# Patient Record
Sex: FEMALE | Race: WHITE | NOT HISPANIC OR LATINO | Employment: OTHER | URBAN - METROPOLITAN AREA
[De-identification: names, ages, dates, MRNs, and addresses within clinical notes are randomized per-mention and may not be internally consistent; named-entity substitution may affect disease eponyms.]

---

## 2017-01-26 ENCOUNTER — OFFICE VISIT (OUTPATIENT)
Dept: MEDICAL GROUP | Facility: MEDICAL CENTER | Age: 72
End: 2017-01-26
Payer: MEDICARE

## 2017-01-26 VITALS
TEMPERATURE: 98.3 F | OXYGEN SATURATION: 96 % | HEART RATE: 91 BPM | DIASTOLIC BLOOD PRESSURE: 74 MMHG | WEIGHT: 173 LBS | SYSTOLIC BLOOD PRESSURE: 124 MMHG | HEIGHT: 64 IN | BODY MASS INDEX: 29.53 KG/M2

## 2017-01-26 DIAGNOSIS — J01.10 ACUTE NON-RECURRENT FRONTAL SINUSITIS: ICD-10-CM

## 2017-01-26 PROCEDURE — 4040F PNEUMOC VAC/ADMIN/RCVD: CPT | Mod: 8P | Performed by: FAMILY MEDICINE

## 2017-01-26 PROCEDURE — 3017F COLORECTAL CA SCREEN DOC REV: CPT | Mod: 8P | Performed by: FAMILY MEDICINE

## 2017-01-26 PROCEDURE — G8432 DEP SCR NOT DOC, RNG: HCPCS | Performed by: FAMILY MEDICINE

## 2017-01-26 PROCEDURE — G8484 FLU IMMUNIZE NO ADMIN: HCPCS | Performed by: FAMILY MEDICINE

## 2017-01-26 PROCEDURE — 1036F TOBACCO NON-USER: CPT | Performed by: FAMILY MEDICINE

## 2017-01-26 PROCEDURE — G8420 CALC BMI NORM PARAMETERS: HCPCS | Performed by: FAMILY MEDICINE

## 2017-01-26 PROCEDURE — 1101F PT FALLS ASSESS-DOCD LE1/YR: CPT | Performed by: FAMILY MEDICINE

## 2017-01-26 PROCEDURE — 99213 OFFICE O/P EST LOW 20 MIN: CPT | Performed by: FAMILY MEDICINE

## 2017-01-26 PROCEDURE — 3014F SCREEN MAMMO DOC REV: CPT | Performed by: FAMILY MEDICINE

## 2017-01-26 RX ORDER — AMOXICILLIN AND CLAVULANATE POTASSIUM 875; 125 MG/1; MG/1
1 TABLET, FILM COATED ORAL 2 TIMES DAILY
Qty: 20 TAB | Refills: 0 | Status: SHIPPED | OUTPATIENT
Start: 2017-01-26 | End: 2017-10-20

## 2017-01-26 RX ORDER — AZITHROMYCIN 250 MG/1
TABLET, FILM COATED ORAL
Qty: 6 TAB | Refills: 0 | Status: SHIPPED | OUTPATIENT
Start: 2017-01-26 | End: 2017-01-31

## 2017-01-26 NOTE — MR AVS SNAPSHOT
"Gali Broderick   2017 10:20 AM   Office Visit   MRN: [id number]    Department:  [facility]   Dept Phone:  [phone]    Description:  Female : 1945   Provider:  Zara Gonzalez M.D.           Reason for Visit     Sinusitis for 2 weeks      Allergies as of 2017     Allergen Noted Reactions    Iodine 2016   Swelling      You were diagnosed with     Acute non-recurrent frontal sinusitis   [[id number]]         Vital Signs     Blood Pressure Pulse Temperature Height Weight Body Mass Index    124/74 mmHg 91 36.8 °C (98.3 °F) 1.631 m (5' 4.21\") 78.472 kg (173 lb) 29.50 kg/m2    Oxygen Saturation Breastfeeding? Smoking Status             96% No Never Smoker          Basic Information     Date Of Birth Sex Race Ethnicity Preferred Language    1945 Female White Non- English      Problem List              ICD-10-CM Priority Class Noted - Resolved    Right knee pain M25.561   2016 - Present    Other seasonal allergic rhinitis J30.2   2016 - Present    Actinic keratosis L57.0   2016 - Present    Dysfunction of eustachian tube H69.80   2016 - Present    Gastroesophageal reflux disease K21.9   2016 - Present    Hyperlipidemia E78.5   2016 - Present    Proteinuria R80.9   2016 - Present    Abnormal renal function N28.9   2016 - Present    Nasal bleeding R04.0   2016 - Present    Vitamin D deficiency E55.9   2016 - Present    Acute non-recurrent frontal sinusitis J01.10   2017 - Present      Health Maintenance        Date Due Completion Dates    IMM DTaP/Tdap/Td Vaccine (1 - Tdap) 1962 ---    PAP SMEAR 1964 ---    COLONOSCOPY 1993 ---    IMM ZOSTER VACCINE 2003 ---    IMM PNEUMOCOCCAL 65+ (ADULT) LOW/MEDIUM RISK SERIES (1 of 2 - PCV13) 2008 ---    BONE DENSITY 2010    IMM INFLUENZA (1) 2016 ---    MAMMOGRAM 8/3/2017 8/3/2016, 2016, 2016, 2005            Current Immunizations     No " immunizations on file.      Below and/or attached are the medications your provider expects you to take. Review all of your home medications and newly ordered medications with your provider and/or pharmacist. Follow medication instructions as directed by your provider and/or pharmacist. Please keep your medication list with you and share with your provider. Update the information when medications are discontinued, doses are changed, or new medications (including over-the-counter products) are added; and carry medication information at all times in the event of emergency situations     Allergies:  IODINE - Swelling               Medications  Valid as of: January 26, 2017 - 10:21 AM    Generic Name Brand Name Tablet Size Instructions for use    Amoxicillin-Pot Clavulanate (Tab) AUGMENTIN 875-125 MG Take 1 Tab by mouth 2 times a day.        Azithromycin (Tab) ZITHROMAX 250 MG 2 tabs by mouth day 1, 1 tab by mouth days 2-5        Fluticasone Propionate (Suspension) FLONASE 50 MCG/ACT Spray 2 Sprays in nose every day.        Mupirocin (Ointment) BACTROBAN 2 % Apply to affected area TID prn        .                 Medicines prescribed today were sent to:     [facility] [address] [place] [address], [place] [address] [facility] [address]    [address] [place] [address]    Phone: [phone] Fax: [phone]    Open 24 Hours?: No      Medication refill instructions:       If your prescription bottle indicates you have medication refills left, it is not necessary to call your provider’s office. Please contact your pharmacy and they will refill your medication.    If your prescription bottle indicates you do not have any refills left, you may request refills at any time through one of the following ways: The online TeamSnap system (except Urgent Care), by calling your provider’s office, or by asking your pharmacy to contact your provider’s office with a refill request. Medication refills are processed only during regular business hours  and may not be available until the next business day. Your provider may request additional information or to have a follow-up visit with you prior to refilling your medication.   *Please Note: Medication refills are assigned a new Rx number when refilled electronically. Your pharmacy may indicate that no refills were authorized even though a new prescription for the same medication is available at the pharmacy. Please request the medicine by name with the pharmacy before contacting your provider for a refill.           [email or web address] Access Code: Activation code not generated  Current [email or web address] Status: Active

## 2017-01-29 NOTE — ASSESSMENT & PLAN NOTE
Patient complains of sinus pressure on the right frontal and behind the right eye for 2 weeks.  Yellow rhinorrhea and PND.  Slight cough at night.  No fever or chills.  Symptoms don't seem to improving.

## 2017-01-29 NOTE — PROGRESS NOTES
"Subjective:     Chief Complaint   Patient presents with   • Sinusitis     for 2 weeks       Gali Broderick is a 71 y.o. female here today for evaluation and management of:    Acute non-recurrent frontal sinusitis  Patient complains of sinus pressure on the right frontal and behind the right eye for 2 weeks.  Yellow rhinorrhea and PND.  Slight cough at night.  No fever or chills.  Symptoms don't seem to improving.       Allergies   Allergen Reactions   • Iodine Swelling       Current medicines (including changes today)  Current Outpatient Prescriptions   Medication Sig Dispense Refill   • amoxicillin-clavulanate (AUGMENTIN) 875-125 MG Tab Take 1 Tab by mouth 2 times a day. 20 Tab 0   • azithromycin (ZITHROMAX) 250 MG Tab 2 tabs by mouth day 1, 1 tab by mouth days 2-5 6 Tab 0   • mupirocin (BACTROBAN) 2 % Ointment Apply to affected area TID prn 15 g 1   • fluticasone (FLONASE) 50 MCG/ACT nasal spray Spray 2 Sprays in nose every day. 16 g 11     No current facility-administered medications for this visit.       She  has a past medical history of Allergy; Measles; Mumps; and Chickenpox.    Patient Active Problem List    Diagnosis Date Noted   • Acute non-recurrent frontal sinusitis 01/26/2017   • Nasal bleeding 09/09/2016   • Vitamin D deficiency 09/09/2016   • Actinic keratosis 08/29/2016   • Dysfunction of eustachian tube 08/29/2016   • Gastroesophageal reflux disease 08/29/2016   • Hyperlipidemia 08/29/2016   • Proteinuria 08/29/2016   • Abnormal renal function 08/29/2016   • Right knee pain 05/09/2016   • Other seasonal allergic rhinitis 05/09/2016       ROS   No fever or chills.  No nausea or vomiting.  No chest pain or palpitations.  No cough or SOB.  No pain with urination or hematuria.  No black or bloody stools.       Objective:     Blood pressure 124/74, pulse 91, temperature 36.8 °C (98.3 °F), height 1.631 m (5' 4.21\"), weight 78.472 kg (173 lb), SpO2 96 %, not currently breastfeeding. Body mass index is 29.5 " kg/(m^2).   Physical Exam:  Well developed, well nourished.  Alert, oriented in no acute distress.  Eye contact is good, speech goal directed, affect calm  Eyes: conjunctiva non-injected, sclera non-icteric.  Ears: Pinna normal. TM pearly gray.   Nose: Nares are patent.  Erythematous swollen mucosa with yellow discharge  Mouth: Oral mucous membranes pink and moist with no lesions. Yellow PND  Neck Supple.  No adenopathy or masses in the neck or supraclavicular regions. No thyromegaly  Lungs: clear to auscultation bilaterally with good excursion. No wheezes or rhonchi  CV: regular rate and rhythm. No murmur  Sinuses- tender right frontal sinus to percussion          Assessment and Plan:   The following treatment plan was discussed    1. Acute non-recurrent frontal sinusitis  Increase fluids and rest.  Start Augmentin  - amoxicillin-clavulanate (AUGMENTIN) 875-125 MG Tab; Take 1 Tab by mouth 2 times a day.  Dispense: 20 Tab; Refill: 0    To take with her on her next travel abroad  - azithromycin (ZITHROMAX) 250 MG Tab; 2 tabs by mouth day 1, 1 tab by mouth days 2-5  Dispense: 6 Tab; Refill: 0    Any change or worsening of signs or symptoms, patient encouraged to follow-up or report to the emergency room for further evaluation. Patient understands and agrees.    Followup: Return if symptoms worsen or fail to improve.

## 2017-06-05 ENCOUNTER — PATIENT OUTREACH (OUTPATIENT)
Dept: HEALTH INFORMATION MANAGEMENT | Facility: OTHER | Age: 72
End: 2017-06-05

## 2017-06-05 NOTE — PROGRESS NOTES
6/5/17  -   WebIZ Checked & Epic Updated: yes  HealthConnect Verified: MCR  Verify PCP: yes    Communication Preference Obtained: yes     Review Care Team: yes    Annual Wellness Visit Scheduling  1. Scheduling Status:Not Scheduled. Patient states they are not interested     Care Gap Scheduling (Attempt to Schedule EACH Overdue Care Gap!)     Health Maintenance Due   Topic Date Due   • Annual Wellness Visit  Declined all care gaps   • IMM DTaP/Tdap/Td Vaccine (1 - Tdap)    • PAP SMEAR     • COLONOSCOPY     • IMM ZOSTER VACCINE     • IMM PNEUMOCOCCAL 65+ (ADULT) LOW/MEDIUM RISK SERIES (1 of 2 - PCV13)    • BONE DENSITY           MyChart Activation: Active

## 2017-10-13 ENCOUNTER — TELEPHONE (OUTPATIENT)
Dept: MEDICAL GROUP | Facility: MEDICAL CENTER | Age: 72
End: 2017-10-13

## 2017-10-13 NOTE — TELEPHONE ENCOUNTER
ANNUAL WELLNESS VISIT PRE-VISIT PLANNING     1.  Reviewed note from last office visit with PCP: YES    2.  If any orders were placed at last visit, do we have Results/Consult Notes?        •  Labs - Labs were not ordered at last office visit.   Note: If patient appointment is for lab review and patient did not complete labs,                check with provider if OK to reschedule patient until labs completed.       •  Imaging - Imaging was not ordered at last office visit.       •  Referrals - No referrals were ordered at last office visit.    3.  Immunizations were updated in Epic using WebIZ?: No WebIZ record       •  WebIZ Recommendations: NO RECORD        •  Is patient due for Tdap? YES. Patient was notified of copay/out of pocket cost.       •  Is patient due for Shingles? YES. Patient was notified of copay/out of pocket cost.     4.  Patient is due for the following Health Maintenance Topics:   Health Maintenance Due   Topic Date Due   • MAMMOGRAM  08/03/2017   • IMM INFLUENZA (1) 09/01/2017        - Patient is up-to-date on all Health Maintenance topics. No records have been requested at this time.    5.  Reviewed/Updated the following with patient:       •   Preferred Pharmacy? YES       •   Preferred Lab? YES       •   Preferred Communication? YES       •   Allergies? YES       •   Medications? YES. Was Abstract Encounter opened and chart updated? YES       •   Social History? NO       •   Family History (document living status of immediate family members and if + hx of cancer, diabetes, hypertension, hyperlipidemia, heart attack, stroke) YES. Was Abstract Encounter opened and chart updated? YES    6.  Care Team Updated:       •   DME Company (gait device, O2, CPAP, etc.): NO       •   Other Specialists (eye doctor, derm, GYN, cardiology, endo, etc): NO    7.  Patient has the following Care Path diagnoses on Problem List:  NONE    8.  Specialty Comments was updated with diagnosis information provided by SCP:  YES    9.  Patient was advised: “This is a free wellness visit. The provider will screen for medical conditions to help you stay healthy. If you have other concerns to address you may be asked to discuss these at a separate visit or there may be an additional fee.”     6.  Patient was informed to arrive 15 min prior to their scheduled appointment and bring in their medication bottles.

## 2017-10-20 ENCOUNTER — OFFICE VISIT (OUTPATIENT)
Dept: MEDICAL GROUP | Facility: MEDICAL CENTER | Age: 72
End: 2017-10-20
Payer: MEDICARE

## 2017-10-20 VITALS
TEMPERATURE: 97.6 F | HEIGHT: 64 IN | DIASTOLIC BLOOD PRESSURE: 86 MMHG | OXYGEN SATURATION: 96 % | WEIGHT: 166.4 LBS | BODY MASS INDEX: 28.41 KG/M2 | SYSTOLIC BLOOD PRESSURE: 140 MMHG | HEART RATE: 95 BPM

## 2017-10-20 DIAGNOSIS — Z00.00 MEDICARE ANNUAL WELLNESS VISIT, SUBSEQUENT: ICD-10-CM

## 2017-10-20 DIAGNOSIS — Z13.0 SCREENING FOR DEFICIENCY ANEMIA: ICD-10-CM

## 2017-10-20 DIAGNOSIS — Z13.29 SCREENING FOR THYROID DISORDER: ICD-10-CM

## 2017-10-20 DIAGNOSIS — R04.0 NASAL BLEEDING: ICD-10-CM

## 2017-10-20 DIAGNOSIS — N28.9 ABNORMAL RENAL FUNCTION: ICD-10-CM

## 2017-10-20 DIAGNOSIS — J30.2 OTHER SEASONAL ALLERGIC RHINITIS: ICD-10-CM

## 2017-10-20 DIAGNOSIS — E78.49 OTHER HYPERLIPIDEMIA: ICD-10-CM

## 2017-10-20 DIAGNOSIS — R80.9 PROTEINURIA, UNSPECIFIED TYPE: ICD-10-CM

## 2017-10-20 PROBLEM — J01.10 ACUTE NON-RECURRENT FRONTAL SINUSITIS: Status: RESOLVED | Noted: 2017-01-26 | Resolved: 2017-10-20

## 2017-10-20 PROCEDURE — G0439 PPPS, SUBSEQ VISIT: HCPCS | Performed by: FAMILY MEDICINE

## 2017-10-20 RX ORDER — FLUTICASONE PROPIONATE 50 MCG
2 SPRAY, SUSPENSION (ML) NASAL DAILY
Qty: 16 G | Refills: 11 | Status: SHIPPED | OUTPATIENT
Start: 2017-10-20 | End: 2019-03-19 | Stop reason: SDUPTHER

## 2017-10-20 ASSESSMENT — PATIENT HEALTH QUESTIONNAIRE - PHQ9: CLINICAL INTERPRETATION OF PHQ2 SCORE: 0

## 2017-10-20 NOTE — PROGRESS NOTES
Chief Complaint   Patient presents with   • Annual Wellness Visit         HPI:  Gali is a 72 y.o. here for Medicare Annual Wellness Visit        Patient Active Problem List    Diagnosis Date Noted   • Nasal bleeding 09/09/2016   • Other hyperlipidemia 08/29/2016   • Proteinuria 08/29/2016   • Abnormal renal function 08/29/2016   • Other seasonal allergic rhinitis 05/09/2016       Current Outpatient Prescriptions   Medication Sig Dispense Refill   • mupirocin (BACTROBAN) 2 % Ointment Apply to affected area TID prn 15 g 1   • fluticasone (FLONASE) 50 MCG/ACT nasal spray Spray 2 Sprays in nose every day. 16 g 11     No current facility-administered medications for this visit.         Patient is taking medications as noted in medication list.  Current supplements as per medication list.     Allergies: Iodine    Current social contact/activities: Pt belongs to [employer].      Is patient current with immunizations? No, due for all. Patient is interested in receiving NONE today.    She  reports that she has never smoked. She has never used smokeless tobacco. She reports that she drinks about 0.6 oz of alcohol per week . She reports that she does not use drugs.  Counseling given: Not Answered        DPA/Advanced directive: Patient has Advanced Directive on file.     ROS:    Gait: Uses no assistive device   Ostomy: no   Other tubes: no   Amputations: no   Chronic oxygen use no   Last eye exam 05/2017   Wears hearing aids: no   : Denies incontinence.           Depression Screening    Little interest or pleasure in doing things?  0 - not at all  Feeling down, depressed, or hopeless? 0 - not at all  Patient Health Questionnaire Score: 0    If depressive symptoms identified deferred to follow up visit unless specifically addressed in assessment and plan.    Interpretation of PHQ-9 Total Score   Score Severity   1-4 No Depression   5-9 Mild Depression   10-14 Moderate Depression   15-19 Moderately Severe  Depression   20-27 Severe Depression    Screening for Cognitive Impairment    Three Minute Recall (apple, watch, mariaa)  3/3    Draw clock face with all 12 numbers set to the hand to show 10 minutes past 11 o'clock  1 5/5  If cognitive concerns identified, deferred for follow up unless specifically addressed in assessment and plan.    Fall Risk Assessment    Has the patient had two or more falls in the last year or any fall with injury in the last year?  No  If fall risk identified, deferred for follow up unless specifically addressed in assessment and plan.    Safety Assessment    Throw rugs on floor.  Yes  Handrails on all stairs.  Yes  Good lighting in all hallways.  Yes  Difficulty hearing.  No  Patient counseled about all safety risks that were identified.    Functional Assessment ADLs    Are there any barriers preventing you from cooking for yourself or meeting nutritional needs?  No.    Are there any barriers preventing you from driving safely or obtaining transportation?  No.    Are there any barriers preventing you from using a telephone or calling for help?  No.    Are there any barriers preventing you from shopping?  No.    Are there any barriers preventing you from taking care of your own finances?  No.    Are there any barriers preventing you from managing your medications?  No.    Are you currently engaging any exercise or physical activity?  Yes.  Pt hikes and walks depending on weather 1x a week.     Health Maintenance Summary                MAMMOGRAM Overdue 8/3/2017      Done 8/3/2016 MA-SCREEN MAMMO W/CAD-BILAT     Patient has more history with this topic...    IMM INFLUENZA Overdue 9/1/2017     BONE DENSITY Postponed 12/5/2017 Originally 9/22/2010. Patient Refused     Done 9/22/2005 DS-BONE DENSITY STUDY (DEXA)    IMM PNEUMOCOCCAL 65+ (ADULT) LOW/MEDIUM RISK SERIES Postponed 12/5/2017 Originally 6/9/2008. Patient Refused    PAP SMEAR Postponed 12/5/2017 Originally 6/9/1964. Patient Refused     "Annual Wellness Visit Postponed 12/5/2017 Originally 1945. Patient Refused    COLONOSCOPY Postponed 12/5/2017 Originally 6/9/1993. Patient Refused    IMM DTaP/Tdap/Td Vaccine Postponed 12/5/2017 Originally 6/9/1962. Patient Refused    IMM ZOSTER VACCINE Postponed 12/5/2017 Originally 6/9/2003. Patient Refused          Patient Care Team:  Zara Gonzalez M.D. as PCP - General (Family Medicine)  Piotr Jacobson M.D. as Consulting Physician (Dermatology)  Christian Chamberlain M.D. as Consulting Physician (Ophthalmology)    Social History   Substance Use Topics   • Smoking status: Never Smoker   • Smokeless tobacco: Never Used   • Alcohol use 0.6 oz/week     1 Glasses of wine per week     Family History   Problem Relation Age of Onset   • Cancer Father      She  has a past medical history of Allergy; Chickenpox; Measles; and Mumps.   Past Surgical History:   Procedure Laterality Date   • ABDOMINAL HYSTERECTOMY TOTAL     • EYE SURGERY      cataract   • HERNIA REPAIR     • TONSILLECTOMY AND ADENOIDECTOMY             Exam:     Blood pressure 140/86, pulse 95, temperature 36.4 °C (97.6 °F), height 1.631 m (5' 4.21\"), weight 75.5 kg (166 lb 6.4 oz), SpO2 96 %. Body mass index is 28.38 kg/m².    Hearing good.    Dentition good  Alert, oriented in no acute distress.  Eye contact is good, speech goal directed, affect calm  Ears: Pinnae normal. Canals clear. TMs normal  Lungs: Clear to auscultation bilaterally without wheezes or rhonchi  CV: Regular rate and rhythm without murmur    Assessment and Plan. The following treatment and monitoring plan is recommended:    1. Medicare annual wellness visit, subsequent  Routine anticipatory guidance. No special services needed at this time    2. Other seasonal allergic rhinitis  Refill Flonase. Avoidance discussed  - fluticasone (FLONASE) 50 MCG/ACT nasal spray; Spray 2 Sprays in nose every day.  Dispense: 16 g; Refill: 11    3. Nasal bleeding  Stable. Continue bacitracin as needed  - " mupirocin (BACTROBAN) 2 % Ointment; Apply to affected area TID prn  Dispense: 15 g; Refill: 1    4. Other hyperlipidemia  Stable. Continue diet and exercise  - LIPID PANEL W/ CHOL/HDL RATIO    5. Abnormal renal function  Stable. Avoid NSAIDs. Recheck chemistry panel  - COMP METABOLIC PANEL    6. Proteinuria, unspecified type  Recheck urine panel  - UA/M W/RFLX CULTURE, ROUTINE    7. Screening for thyroid disorder  Screening labs ordered.  Await results for counseling.    - TSH    8. Screening for deficiency anemia  Screening labs ordered.  Await results for counseling.    - CBC WITH DIFFERENTIAL      Services suggested: No services needed at this time  Health Care Screening recommendations as per orders if indicated.  Referrals offered: PT/OT/Nutrition counseling/Behavioral Health/Smoking cessation as per orders if indicated.    Discussion today about general wellness and lifestyle habits:    · Prevent falls and reduce trip hazards; Cautioned about securing or removing rugs.  · Have a working fire alarm and carbon monoxide detector;   · Engage in regular physical activity and social activities       Follow-up: Return in about 1 year (around 10/20/2018).

## 2017-10-25 LAB
ALBUMIN SERPL-MCNC: 4.1 G/DL (ref 3.5–4.8)
ALBUMIN/GLOB SERPL: 1.6 {RATIO} (ref 1.2–2.2)
ALP SERPL-CCNC: 89 IU/L (ref 39–117)
ALT SERPL-CCNC: 19 IU/L (ref 0–32)
APPEARANCE UR: CLEAR
AST SERPL-CCNC: 19 IU/L (ref 0–40)
BACTERIA #/AREA URNS HPF: ABNORMAL /[HPF]
BACTERIA UR CULT: NO GROWTH
BACTERIA UR CULT: NORMAL
BASOPHILS # BLD AUTO: 0 X10E3/UL (ref 0–0.2)
BASOPHILS NFR BLD AUTO: 1 %
BILIRUB SERPL-MCNC: 0.5 MG/DL (ref 0–1.2)
BILIRUB UR QL STRIP: NEGATIVE
BUN SERPL-MCNC: 21 MG/DL (ref 8–27)
BUN/CREAT SERPL: 27 (ref 12–28)
CALCIUM SERPL-MCNC: 9.2 MG/DL (ref 8.7–10.3)
CASTS URNS MICRO: ABNORMAL
CASTS URNS QL MICRO: ABNORMAL
CHLORIDE SERPL-SCNC: 103 MMOL/L (ref 96–106)
CHOLEST SERPL-MCNC: 212 MG/DL (ref 100–199)
CHOLEST/HDLC SERPL: 3.5 RATIO UNITS (ref 0–4.4)
CO2 SERPL-SCNC: 23 MMOL/L (ref 18–29)
COLOR UR: YELLOW
COMMENT 011824: ABNORMAL
CREAT SERPL-MCNC: 0.78 MG/DL (ref 0.57–1)
CRYSTALS URNS MICRO: ABNORMAL
EOSINOPHIL # BLD AUTO: 0.1 X10E3/UL (ref 0–0.4)
EOSINOPHIL NFR BLD AUTO: 3 %
EPI CELLS #/AREA URNS HPF: ABNORMAL /HPF
ERYTHROCYTE [DISTWIDTH] IN BLOOD BY AUTOMATED COUNT: 13.4 % (ref 12.3–15.4)
GFR SERPLBLD CREATININE-BSD FMLA CKD-EPI: 75 ML/MIN/1.73
GFR SERPLBLD CREATININE-BSD FMLA CKD-EPI: 87 ML/MIN/1.73
GLOBULIN SER CALC-MCNC: 2.5 G/DL (ref 1.5–4.5)
GLUCOSE SERPL-MCNC: 97 MG/DL (ref 65–99)
GLUCOSE UR QL: NEGATIVE
HCT VFR BLD AUTO: 40.3 % (ref 34–46.6)
HDLC SERPL-MCNC: 60 MG/DL
HGB BLD-MCNC: 13.2 G/DL (ref 11.1–15.9)
HGB UR QL STRIP: NEGATIVE
IMM GRANULOCYTES # BLD: 0 X10E3/UL (ref 0–0.1)
IMM GRANULOCYTES NFR BLD: 0 %
IMMATURE CELLS  115398: NORMAL
KETONES UR QL STRIP: NEGATIVE
LDLC SERPL CALC-MCNC: 131 MG/DL (ref 0–99)
LEUKOCYTE ESTERASE UR QL STRIP: ABNORMAL
LYMPHOCYTES # BLD AUTO: 1.4 X10E3/UL (ref 0.7–3.1)
LYMPHOCYTES NFR BLD AUTO: 32 %
MCH RBC QN AUTO: 30.1 PG (ref 26.6–33)
MCHC RBC AUTO-ENTMCNC: 32.8 G/DL (ref 31.5–35.7)
MCV RBC AUTO: 92 FL (ref 79–97)
MICRO URNS: ABNORMAL
MICRO URNS: ABNORMAL
MONOCYTES # BLD AUTO: 0.3 X10E3/UL (ref 0.1–0.9)
MONOCYTES NFR BLD AUTO: 7 %
MORPHOLOGY BLD-IMP: NORMAL
MUCOUS THREADS URNS QL MICRO: PRESENT
NEUTROPHILS # BLD AUTO: 2.5 X10E3/UL (ref 1.4–7)
NEUTROPHILS NFR BLD AUTO: 57 %
NITRITE UR QL STRIP: NEGATIVE
NRBC BLD AUTO-RTO: NORMAL %
PH UR STRIP: 6 [PH] (ref 5–7.5)
PLATELET # BLD AUTO: 178 X10E3/UL (ref 150–379)
POTASSIUM SERPL-SCNC: 3.8 MMOL/L (ref 3.5–5.2)
PROT SERPL-MCNC: 6.6 G/DL (ref 6–8.5)
PROT UR QL STRIP: ABNORMAL
RBC # BLD AUTO: 4.39 X10E6/UL (ref 3.77–5.28)
RBC #/AREA URNS HPF: ABNORMAL /HPF
RENAL EPI CELLS #/AREA URNS HPF: ABNORMAL /[HPF]
SODIUM SERPL-SCNC: 142 MMOL/L (ref 134–144)
SP GR UR: 1.03 (ref 1–1.03)
T VAGINALIS URNS QL MICRO: ABNORMAL
TRIGL SERPL-MCNC: 105 MG/DL (ref 0–149)
TSH SERPL DL<=0.005 MIU/L-ACNC: 2.16 UIU/ML (ref 0.45–4.5)
UNIDENT CRYS URNS QL MICRO: PRESENT
URINALYSIS REFLEX  377202: ABNORMAL
URNS CMNT MICRO: ABNORMAL
UROBILINOGEN UR STRIP-MCNC: 0.2 MG/DL (ref 0.2–1)
VLDLC SERPL CALC-MCNC: 21 MG/DL (ref 5–40)
WBC # BLD AUTO: 4.3 X10E3/UL (ref 3.4–10.8)
WBC #/AREA URNS HPF: ABNORMAL /HPF
YEAST #/AREA URNS HPF: ABNORMAL /[HPF]

## 2017-11-02 ENCOUNTER — OFFICE VISIT (OUTPATIENT)
Dept: MEDICAL GROUP | Facility: MEDICAL CENTER | Age: 72
End: 2017-11-02
Payer: MEDICARE

## 2017-11-02 ENCOUNTER — HOSPITAL ENCOUNTER (OUTPATIENT)
Dept: RADIOLOGY | Facility: MEDICAL CENTER | Age: 72
End: 2017-11-02
Attending: FAMILY MEDICINE
Payer: MEDICARE

## 2017-11-02 VITALS
BODY MASS INDEX: 28.17 KG/M2 | HEART RATE: 69 BPM | OXYGEN SATURATION: 97 % | TEMPERATURE: 98.3 F | WEIGHT: 165 LBS | DIASTOLIC BLOOD PRESSURE: 78 MMHG | HEIGHT: 64 IN | SYSTOLIC BLOOD PRESSURE: 132 MMHG | RESPIRATION RATE: 16 BRPM

## 2017-11-02 DIAGNOSIS — R07.89 OTHER CHEST PAIN: ICD-10-CM

## 2017-11-02 PROCEDURE — 71020 DX-CHEST-2 VIEWS: CPT

## 2017-11-02 PROCEDURE — 99214 OFFICE O/P EST MOD 30 MIN: CPT | Performed by: FAMILY MEDICINE

## 2017-11-02 NOTE — ASSESSMENT & PLAN NOTE
"When she lays back for the last 10 days she gets a \"head rush\".  No room spinning but off kilter.  Has a heaviness in her chest that radiates in her back.  Breathing makes the back pain increase.   She denies any nausea or vomiting. No diaphoresis. She had something similar 10 years ago.  "

## 2017-11-03 NOTE — PROGRESS NOTES
"Subjective:     Chief Complaint   Patient presents with   • Chest Pain     when laying down    • Back Pain       Gali Broderick is a 72 y.o. female here today for evaluation and management of:    Other chest pain  When she lays back for the last 10 days she gets a \"head rush\".  No room spinning but off kilter.  Has a heaviness in her chest that radiates in her back.  Breathing makes the back pain increase.   She denies any nausea or vomiting. No diaphoresis. She had something similar 10 years ago.       Allergies   Allergen Reactions   • Iodine Swelling       Current medicines (including changes today)  Current Outpatient Prescriptions   Medication Sig Dispense Refill   • mupirocin (BACTROBAN) 2 % Ointment Apply to affected area TID prn 15 g 1   • fluticasone (FLONASE) 50 MCG/ACT nasal spray Spray 2 Sprays in nose every day. 16 g 11     No current facility-administered medications for this visit.        She  has a past medical history of Allergy; Chickenpox; Measles; and Mumps.    Patient Active Problem List    Diagnosis Date Noted   • Other chest pain 11/02/2017   • Nasal bleeding 09/09/2016   • Other hyperlipidemia 08/29/2016   • Proteinuria 08/29/2016   • Abnormal renal function 08/29/2016   • Other seasonal allergic rhinitis 05/09/2016       ROS   No fever or chills.  No nausea or vomiting.  Nopalpitations.  No cough or SOB.  No pain with urination or hematuria.  No black or bloody stools.       Objective:     Blood pressure 132/78, pulse 69, temperature 36.8 °C (98.3 °F), resp. rate 16, height 1.631 m (5' 4.21\"), weight 74.8 kg (165 lb), SpO2 97 %. Body mass index is 28.14 kg/m².   Physical Exam:  Well developed, well nourished.  Alert, oriented in no acute distress.  Eye contact is good, speech goal directed, affect calm  Eyes: conjunctiva non-injected, sclera non-icteric.  Neck Supple.  No adenopathy or masses in the neck or supraclavicular regions. No thyromegaly  Lungs: clear to auscultation bilaterally with " good excursion. No wheezes or rhonchi  CV: regular rate and rhythm. No murmur  Ext: no edema, color normal, vascularity normal, temperature normal, no calf tenderness  EKG - normal sinus rhythm with 68. No ST elevation or depressions. Normal QTC of 444        Assessment and Plan:   The following treatment plan was discussed    1. Other chest pain  Chest x-ray to assess. This could be GERD type symptoms. Discussed using over-the-counter omeprazole. Consider referral to cardiology if not improving.  - EKG; Future  - DX-CHEST-2 VIEWS; Future    Any change or worsening of signs or symptoms, patient encouraged to follow-up or report to the emergency room for further evaluation. Patient understands and agrees.    Followup: Return if symptoms worsen or fail to improve.

## 2018-03-02 ENCOUNTER — OFFICE VISIT (OUTPATIENT)
Dept: MEDICAL GROUP | Facility: MEDICAL CENTER | Age: 73
End: 2018-03-02
Payer: MEDICARE

## 2018-03-02 VITALS
HEART RATE: 85 BPM | HEIGHT: 64 IN | WEIGHT: 169 LBS | OXYGEN SATURATION: 93 % | RESPIRATION RATE: 16 BRPM | SYSTOLIC BLOOD PRESSURE: 128 MMHG | BODY MASS INDEX: 28.85 KG/M2 | DIASTOLIC BLOOD PRESSURE: 82 MMHG | TEMPERATURE: 98.1 F

## 2018-03-02 DIAGNOSIS — L57.0 ACTINIC KERATOSIS: ICD-10-CM

## 2018-03-02 DIAGNOSIS — B07.8 OTHER VIRAL WARTS: ICD-10-CM

## 2018-03-02 DIAGNOSIS — Z87.09 HISTORY OF BRONCHITIS: ICD-10-CM

## 2018-03-02 DIAGNOSIS — R07.89 OTHER CHEST PAIN: ICD-10-CM

## 2018-03-02 PROCEDURE — 17000 DESTRUCT PREMALG LESION: CPT | Mod: 59 | Performed by: FAMILY MEDICINE

## 2018-03-02 PROCEDURE — 17003 DESTRUCT PREMALG LES 2-14: CPT | Performed by: FAMILY MEDICINE

## 2018-03-02 PROCEDURE — 99214 OFFICE O/P EST MOD 30 MIN: CPT | Mod: 25 | Performed by: FAMILY MEDICINE

## 2018-03-02 PROCEDURE — 17110 DESTRUCTION B9 LES UP TO 14: CPT | Performed by: FAMILY MEDICINE

## 2018-03-02 RX ORDER — AZITHROMYCIN 250 MG/1
TABLET, FILM COATED ORAL
Qty: 6 TAB | Refills: 0 | Status: SHIPPED | OUTPATIENT
Start: 2018-03-02 | End: 2018-03-07

## 2018-03-02 RX ORDER — NAPROXEN 500 MG/1
500 TABLET ORAL 2 TIMES DAILY WITH MEALS
Qty: 60 TAB | Refills: 0 | Status: SHIPPED | OUTPATIENT
Start: 2018-03-02 | End: 2018-11-29

## 2018-03-02 NOTE — ASSESSMENT & PLAN NOTE
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  Naprosyn worked each time for this.  Slight cough.  No SOB.

## 2018-03-05 NOTE — PROGRESS NOTES
Subjective:     Chief Complaint   Patient presents with   • Follow-Up       Gali Broderick is a 72 y.o. 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  Naprosyn worked each time for this.  Slight cough.  No SOB.    Actinic keratosis  Patient has a few erythematous scaling lesions that she would like frozen.    Other viral warts  Patient has a wart on her finger that she would like frozen off as it is enlarging.    History of bronchitis  Patient is going to be traveling in [place] and would like to take antibiotics with her just in case.       Allergies   Allergen Reactions   • Iodine Swelling       Current medicines (including changes today)  Current Outpatient Prescriptions   Medication Sig Dispense Refill   • azithromycin (ZITHROMAX) 250 MG Tab 2 tabs by mouth day 1, 1 tab by mouth days 2-5 6 Tab 0   • naproxen (NAPROSYN) 500 MG Tab Take 1 Tab by mouth 2 times a day, with meals. 60 Tab 0   • mupirocin (BACTROBAN) 2 % Ointment Apply to affected area TID prn 15 g 1   • fluticasone (FLONASE) 50 MCG/ACT nasal spray Spray 2 Sprays in nose every day. 16 g 11     No current facility-administered medications for this visit.        She  has a past medical history of Allergy; Chickenpox; Measles; and Mumps.    Patient Active Problem List    Diagnosis Date Noted   • Other viral warts 03/02/2018   • History of bronchitis 03/02/2018   • Other chest pain 11/02/2017   • Nasal bleeding 09/09/2016   • Actinic keratosis 08/29/2016   • Other hyperlipidemia 08/29/2016   • Proteinuria 08/29/2016   • Abnormal renal function 08/29/2016   • Other seasonal allergic rhinitis 05/09/2016       ROS   No fever or chills.  No nausea or vomiting.  No palpitations.  No cough or SOB.  No pain with urination or hematuria.  No black or bloody stools.       Objective:     Blood pressure 128/82,  "pulse 85, temperature 36.7 °C (98.1 °F), resp. rate 16, height 1.631 m (5' 4.2\"), weight 76.7 kg (169 lb), SpO2 93 %. Body mass index is 28.83 kg/m².   Physical Exam:  Well developed, well nourished.  Alert, oriented in no acute distress.  Eye contact is good, speech goal directed, affect calm  Eyes: conjunctiva non-injected, sclera non-icteric.  Ears: Pinna normal. TM pearly gray.   Nose: Nares are patent.  Normal mucosa  Mouth: Oral mucous membranes pink and moist with no lesions.  Neck Supple.  No adenopathy or masses in the neck or supraclavicular regions. No thyromegaly  Lungs: clear to auscultation bilaterally with good excursion. No wheezes or rhonchi  CV: regular rate and rhythm. No murmur  Skin: Erythematous scaling lesion on right forearm and one under the left nare. Verrucous lesion on left hand third finger      Assessment and Plan:   The following treatment plan was discussed    1. Other chest pain  Naproxen as directed. If not improving recommend echocardiogram and consider CT scan of the chest  - naproxen (NAPROSYN) 500 MG Tab; Take 1 Tab by mouth 2 times a day, with meals.  Dispense: 60 Tab; Refill: 0  - ECHOCARDIOGRAM-COMP W/ CONT; Future    2. Other viral warts  CRYOTHERAPY:  Discussed risks and benefits of cryotherapy. Patient verbally agreed. 3 applications of cryotherapy were applied to one lesion on left hand. Patient tolerated procedure well. Aftercare instructions given.      3. History of bronchitis  Discussed when to seek medical care if not improving  - azithromycin (ZITHROMAX) 250 MG Tab; 2 tabs by mouth day 1, 1 tab by mouth days 2-5  Dispense: 6 Tab; Refill: 0    4. Actinic keratosis  CRYOTHERAPY:  Discussed risks and benefits of cryotherapy. Patient verbally agreed. 3 applications of cryotherapy were applied to 2 lesion on right forearm and under left nare. Patient tolerated procedure well. Aftercare instructions given.      Any change or worsening of signs or symptoms, patient " encouraged to follow-up or report to the emergency room for further evaluation. Patient understands and agrees.    Followup: Return if symptoms worsen or fail to improve.

## 2018-03-05 NOTE — ASSESSMENT & PLAN NOTE
Patient is going to be traveling in [place] and would like to take antibiotics with her just in case.

## 2018-03-13 ENCOUNTER — HOSPITAL ENCOUNTER (OUTPATIENT)
Dept: CARDIOLOGY | Facility: MEDICAL CENTER | Age: 73
End: 2018-03-13
Attending: FAMILY MEDICINE
Payer: MEDICARE

## 2018-03-13 DIAGNOSIS — R07.89 OTHER CHEST PAIN: ICD-10-CM

## 2018-03-13 PROCEDURE — 93306 TTE W/DOPPLER COMPLETE: CPT | Mod: 26 | Performed by: INTERNAL MEDICINE

## 2018-03-13 PROCEDURE — 93306 TTE W/DOPPLER COMPLETE: CPT

## 2018-03-14 LAB
LV EJECT FRACT  99904: 70
LV EJECT FRACT MOD 2C 99903: 70.7
LV EJECT FRACT MOD 4C 99902: 71.07
LV EJECT FRACT MOD BP 99901: 68.59

## 2018-03-15 ENCOUNTER — TELEPHONE (OUTPATIENT)
Dept: MEDICAL GROUP | Facility: MEDICAL CENTER | Age: 73
End: 2018-03-15

## 2018-03-15 DIAGNOSIS — Q24.8 CONGENITAL ASYMMETRIC SEPTAL HYPERTROPHY: ICD-10-CM

## 2018-03-15 NOTE — TELEPHONE ENCOUNTER
1. Caller Name: Gali Donato Burton                                           Call Back Number: [phone] (home)         Patient approves a detailed voicemail message: yes    2. SPECIFIC Action To Be Taken: Orders pending, please sign. and Referral pending, please sign.    3. Diagnosis/Clinical Reason for Request: echocardiogram results     4. Specialty & Provider Name/Lab/Imaging Location: [facility]    5. Is appointment scheduled for requested order/referral: NO

## 2018-03-27 ENCOUNTER — TELEPHONE (OUTPATIENT)
Dept: MEDICAL GROUP | Facility: MEDICAL CENTER | Age: 73
End: 2018-03-27

## 2018-03-27 NOTE — TELEPHONE ENCOUNTER
1. Caller Name: Gali Donato Burton                        Call Back Number: [phone] (home)       2. Message: Pt called just to let you know that she did get an appt with [facility] but it is not until 6/13. She also wanted you to know that she has had no more chest pain since she started the antiacids.    3. Patient approves office to leave a detailed voicemail/MyChart message: no

## 2018-03-29 NOTE — TELEPHONE ENCOUNTER
As she is not having anymore pain I think June is fine but please keep appointment with cardiology.  Zara Gonzalez M.D.

## 2018-10-24 ENCOUNTER — PATIENT OUTREACH (OUTPATIENT)
Dept: HEALTH INFORMATION MANAGEMENT | Facility: OTHER | Age: 73
End: 2018-10-24

## 2018-10-24 NOTE — PROGRESS NOTES
1. Attempt #:1    2. HealthConnect Verified: no    3. Verify PCP: yes    4. Review Care Team: yes    5. WebIZ Checked & Epic Updated: Yes  · WebIZ Recommendations: FLU, TDAP and SHINGRIX (Shingles)  · Is patient due for Tdap? YES. Patient was not notified of copay/out of pocket cost.  · Is patient due for Shingles? YES. Patient was not notified of copay/out of pocket cost.    6. Reviewed/Updated the following with patient:       •   Communication Preference Obtained? YES       •   Preferred Pharmacy? YES       •   Preferred Lab? YES       •   Family History (document living status of immediate family members and if + hx of cancer, diabetes, hypertension, hyperlipidemia, heart attack, stroke) YES. Was Abstract Encounter opened and chart updated? YES    7. Annual Wellness Visit Scheduling  · Scheduling Status:Scheduled          9. Care Gap Scheduling (Attempt to Schedule EACH Overdue Care Gap!)     Health Maintenance Due   Topic Date Due   • IMM DTaP/Tdap/Td Vaccine (1 - Tdap) 06/09/1962   • PAP SMEAR  06/09/1964   • COLONOSCOPY  06/09/1993   • IMM ZOSTER VACCINES (1 of 2) 06/09/1993   • IMM PNEUMOCOCCAL 65+ (ADULT) LOW/MEDIUM RISK SERIES (1 of 2 - PCV13) 06/09/2008   • BONE DENSITY  09/22/2010   • IMM INFLUENZA (1) 09/01/2018   • Annual Wellness Visit  10/21/2018        Patient prefers to discuss Immunizations: FLU, PNEUMOVAX (PPSV23), TDAP and SHINGRIX (Shingles) with PCP.     10. ApplyMap Activation: already active    11. ApplyMap Mando: no    12. Virtual Visits: no    13. Opt In to Text Messages: no    14. Patient was advised: “This is a free wellness visit. The provider will screen for medical conditions to help you stay healthy. If you have other concerns to address you may be asked to discuss these at a separate visit or there may be an additional fee.”     15. Patient was informed to arrive 15 min prior to their scheduled appointment and bring in their medication bottles.

## 2018-11-21 ENCOUNTER — TELEPHONE (OUTPATIENT)
Dept: MEDICAL GROUP | Facility: LAB | Age: 73
End: 2018-11-21

## 2018-11-21 NOTE — TELEPHONE ENCOUNTER
PVP WITH OUTREACH  Future Appointments       Provider Department Center    11/29/2018 9:20 AM Zara Gonzalez M.D.; [facility]  [facility]           ANNUAL WELLNESS VISIT PRE-VISIT PLANNING     1.  Immunizations were updated in Epic using WebIZ?: No WebIZ record       •  WebIZ Recommendations:        •  Is patient due for Tdap? NO       •  Is patient due for Shingles? NO     2.  Specialty Comments was updated with diagnosis information provided by SCP: N\A [facility]

## 2018-11-29 ENCOUNTER — OFFICE VISIT (OUTPATIENT)
Dept: MEDICAL GROUP | Facility: LAB | Age: 73
End: 2018-11-29
Payer: MEDICARE

## 2018-11-29 VITALS
WEIGHT: 173.06 LBS | BODY MASS INDEX: 29.55 KG/M2 | HEART RATE: 82 BPM | SYSTOLIC BLOOD PRESSURE: 136 MMHG | TEMPERATURE: 98.2 F | OXYGEN SATURATION: 93 % | RESPIRATION RATE: 16 BRPM | HEIGHT: 64 IN | DIASTOLIC BLOOD PRESSURE: 74 MMHG

## 2018-11-29 DIAGNOSIS — I35.1 MILD AORTIC INSUFFICIENCY: ICD-10-CM

## 2018-11-29 DIAGNOSIS — Z00.00 MEDICARE ANNUAL WELLNESS VISIT, SUBSEQUENT: ICD-10-CM

## 2018-11-29 DIAGNOSIS — J30.2 OTHER SEASONAL ALLERGIC RHINITIS: ICD-10-CM

## 2018-11-29 DIAGNOSIS — E78.49 OTHER HYPERLIPIDEMIA: ICD-10-CM

## 2018-11-29 DIAGNOSIS — G89.29 CHRONIC PAIN OF BOTH KNEES: ICD-10-CM

## 2018-11-29 DIAGNOSIS — M25.562 CHRONIC PAIN OF BOTH KNEES: ICD-10-CM

## 2018-11-29 DIAGNOSIS — M25.561 CHRONIC PAIN OF BOTH KNEES: ICD-10-CM

## 2018-11-29 PROBLEM — R07.89 OTHER CHEST PAIN: Status: RESOLVED | Noted: 2017-11-02 | Resolved: 2018-11-29

## 2018-11-29 PROBLEM — B07.8 OTHER VIRAL WARTS: Status: RESOLVED | Noted: 2018-03-02 | Resolved: 2018-11-29

## 2018-11-29 PROCEDURE — G0439 PPPS, SUBSEQ VISIT: HCPCS | Performed by: FAMILY MEDICINE

## 2018-11-29 ASSESSMENT — ENCOUNTER SYMPTOMS: GENERAL WELL-BEING: GOOD

## 2018-11-29 ASSESSMENT — PATIENT HEALTH QUESTIONNAIRE - PHQ9: CLINICAL INTERPRETATION OF PHQ2 SCORE: 0

## 2018-11-29 ASSESSMENT — ACTIVITIES OF DAILY LIVING (ADL): BATHING_REQUIRES_ASSISTANCE: 0

## 2018-11-29 NOTE — PROGRESS NOTES
Chief Complaint   Patient presents with   • Annual Exam         HPI:  Gail is a 73 y.o. here for Medicare Annual Wellness Visit        Patient Active Problem List    Diagnosis Date Noted   • Mild aortic insufficiency 11/29/2018   • Chronic pain of both knees 11/29/2018   • History of bronchitis 03/02/2018   • Actinic keratosis 08/29/2016   • Other hyperlipidemia 08/29/2016   • Other seasonal allergic rhinitis 05/09/2016       Current Outpatient Prescriptions   Medication Sig Dispense Refill   • mupirocin (BACTROBAN) 2 % Ointment Apply to affected area TID prn 15 g 1   • fluticasone (FLONASE) 50 MCG/ACT nasal spray Spray 2 Sprays in nose every day. 16 g 11     No current facility-administered medications for this visit.         Patient is taking medications as noted in medication list.  Current supplements as per medication list.     Allergies: Iodine    Current social contact/activities: book club, sorority, Sikhism, quilting , guild, welcome  at [employer], travel     Is patient current with immunizations? No, however patient refuses all vaccines at this time.     She  reports that she has never smoked. She has never used smokeless tobacco. She reports that she drinks about 0.6 oz of alcohol per week . She reports that she does not use drugs.  Counseling given: Not Answered        DPA/Advanced directive: Patient has Advanced Directive, but it is not on file. Instructed to bring in a copy to scan into their chart.    ROS:    Gait: Uses no assistive device   Ostomy: No   Other tubes: No   Amputations: No   Chronic oxygen use No   Last eye exam:  August 2018 with Dr. Suarez  Wears hearing aids: No   : Denies any urinary leakage during the last 6 months      Screening:      Depression Screening    Little interest or pleasure in doing things?  0 - not at all  Feeling down, depressed, or hopeless? 0 - not at all  Patient Health Questionnaire Score: 0    If depressive symptoms identified deferred to follow up visit  unless specifically addressed in assessment and plan.    Interpretation of PHQ-9 Total Score   Score Severity   1-4 No Depression   5-9 Mild Depression   10-14 Moderate Depression   15-19 Moderately Severe Depression   20-27 Severe Depression    Screening for Cognitive Impairment    Three Minute Recall (leader, season, table)  33/3    Barron clock face with all 12 numbers and set the hands to show 10 past 11.  Yes    If cognitive concerns identified, deferred for follow up unless specifically addressed in assessment and plan.    Fall Risk Assessment    Has the patient had two or more falls in the last year or any fall with injury in the last year?  No  If fall risk identified, deferred for follow up unless specifically addressed in assessment and plan.    Safety Assessment    Throw rugs on floor. no No  Handrails on all stairs.  Yes  Good lighting in all hallways.  Yes  Difficulty hearing.  No  Patient counseled about all safety risks that were identified.    Functional Assessment ADLs    Are there any barriers preventing you from cooking for yourself or meeting nutritional needs?  No.    Are there any barriers preventing you from driving safely or obtaining transportation?  No.    Are there any barriers preventing you from using a telephone or calling for help?  No.    Are there any barriers preventing you from shopping?  No.    Are there any barriers preventing you from taking care of your own finances?  No.    Are there any barriers preventing you from managing your medications?  No.    Are there any barriers preventing you from showering, bathing or dressing yourself?  No.    Are you currently engaging in any exercise or physical activity?  Yes.     What is your perception of your health?  Good.    Health Maintenance Summary                IMM DTaP/Tdap/Td Vaccine Overdue 6/9/1962     PAP SMEAR Overdue 6/9/1964     COLONOSCOPY Overdue 6/9/1993     IMM ZOSTER VACCINES Overdue 6/9/1993     IMM PNEUMOCOCCAL 65+  "(ADULT) LOW/MEDIUM RISK SERIES Overdue 6/9/2008     BONE DENSITY Overdue 9/22/2010      Done 9/22/2005 DS-BONE DENSITY STUDY (DEXA)    IMM INFLUENZA Overdue 9/1/2018     Annual Wellness Visit Overdue 10/21/2018      Done 10/20/2017 Visit Dx: Medicare annual wellness visit, subsequent    MAMMOGRAM Overdue 11/9/2018      Done 11/9/2017 [id number]     Patient has more history with this topic...          Patient Care Team:  Zara Gonzalez M.D. as PCP - General (Family Medicine)  Piotr Jacobson M.D. as Consulting Physician (Dermatology)  Christian Chamberlain M.D. as Consulting Physician (Ophthalmology)  Ty Henley M.D. (Cardiology)    Social History   Substance Use Topics   • Smoking status: Never Smoker   • Smokeless tobacco: Never Used   • Alcohol use 0.6 oz/week     1 Glasses of wine per week     Family History   Problem Relation Age of Onset   • No Known Problems Mother    • Cancer Father      She  has a past medical history of Allergy; Chickenpox; Measles; and Mumps.   Past Surgical History:   Procedure Laterality Date   • ABDOMINAL HYSTERECTOMY TOTAL     • EYE SURGERY      cataract   • HERNIA REPAIR     • TONSILLECTOMY AND ADENOIDECTOMY             Exam:     Blood pressure 136/74, pulse 82, temperature 36.8 °C (98.2 °F), temperature source Temporal, resp. rate 16, height 1.626 m (5' 4\"), weight 78.5 kg (173 lb 1 oz), SpO2 93 %, not currently breastfeeding. Body mass index is 29.71 kg/m².    Hearing good.    Dentition good  Alert, oriented in no acute distress.  Eye contact is good, speech goal directed, affect calm  Constitutional: Alert, no distress.  Skin: Warm, dry, good turgor, no rashes in visible areas.  Eye: Equal, round and reactive, conjunctiva clear, lids normal.  ENMT: Lips without lesions, good dentition, oropharynx clear.  Neck: Trachea midline, no masses, no thyromegaly. No cervical or supraclavicular lymphadenopathy  Respiratory: Unlabored respiratory effort, lungs clear to auscultation, " no wheezes, no ronchi.  Cardiovascular: Normal S1, S2, RRR, no murmur, no edema.  Abdomen: Soft, non-tender, no masses, no hepatosplenomegaly.  Psych: Alert and oriented x3, normal affect and mood.        Assessment and Plan. The following treatment and monitoring plan is recommended:    1. Medicare annual wellness visit, subsequent  Routine anticipatory guidance.  No special services needed at this time    2. Other hyperlipidemia  Dietary modifications.  Continue low-fat diet.    3. Mild aortic insufficiency  Recent echocardiogram.  Patient is following with cardiology.    4. Chronic pain of both knees  Follow-up with orthopedics as scheduled.    5. Other seasonal allergic rhinitis  Symptomatic care.  Discussed avoidance and OTC medications.      Services suggested: No services needed at this time  Health Care Screening recommendations as per orders if indicated.  Referrals offered: PT/OT/Nutrition counseling/Behavioral Health/Smoking cessation as per orders if indicated.    Discussion today about general wellness and lifestyle habits:    · Prevent falls and reduce trip hazards; Cautioned about securing or removing rugs.  · Have a working fire alarm and carbon monoxide detector;   · Engage in regular physical activity and social activities       Follow-up: Return in about 1 year (around 11/29/2019).

## 2018-11-29 NOTE — PATIENT INSTRUCTIONS
Food Choices for Gastroesophageal Reflux Disease, Adult  When you have gastroesophageal reflux disease (GERD), the foods you eat and your eating habits are very important. Choosing the right foods can help ease the discomfort of GERD.  WHAT GENERAL GUIDELINES DO I NEED TO FOLLOW?  · Choose fruits, vegetables, whole grains, low-fat dairy products, and low-fat meat, fish, and poultry.  · Limit fats such as oils, salad dressings, butter, nuts, and avocado.  · Keep a food diary to identify foods that cause symptoms.  · Avoid foods that cause reflux. These may be different for different people.  · Eat frequent small meals instead of three large meals each day.  · Eat your meals slowly, in a relaxed setting.  · Limit fried foods.  · Cook foods using methods other than frying.  · Avoid drinking alcohol.  · Avoid drinking large amounts of liquids with your meals.  · Avoid bending over or lying down until 2-3 hours after eating.  WHAT FOODS ARE NOT RECOMMENDED?  The following are some foods and drinks that may worsen your symptoms:  Vegetables  Tomatoes. Tomato juice. Tomato and spaghetti sauce. Chili peppers. Onion and garlic. Horseradish.  Fruits  Oranges, grapefruit, and lemon (fruit and juice).  Meats  High-fat meats, fish, and poultry. This includes hot dogs, ribs, ham, sausage, salami, and mei.  Dairy  Whole milk and chocolate milk. Sour cream. Cream. Butter. Ice cream. Cream cheese.   Beverages  Coffee and tea, with or without caffeine. Carbonated beverages or energy drinks.  Condiments  Hot sauce. Barbecue sauce.   Sweets/Desserts  Chocolate and cocoa. Donuts. Peppermint and spearmint.  Fats and Oils  High-fat foods, including French fries and potato chips.  Other  Vinegar. Strong spices, such as black pepper, white pepper, red pepper, cayenne, meza powder, cloves, ginger, and chili powder.  The items listed above may not be a complete list of foods and beverages to avoid. Contact your dietitian for more  information.     This information is not intended to replace advice given to you by your health care provider. Make sure you discuss any questions you have with your health care provider.     Document Released: 12/18/2006 Document Revised: 01/08/2016 Document Reviewed: 10/22/2014  [employer] Interactive Patient Education ©2016 [employer]

## 2019-03-19 ENCOUNTER — OFFICE VISIT (OUTPATIENT)
Dept: MEDICAL GROUP | Facility: LAB | Age: 74
End: 2019-03-19
Payer: MEDICARE

## 2019-03-19 VITALS
SYSTOLIC BLOOD PRESSURE: 140 MMHG | RESPIRATION RATE: 20 BRPM | OXYGEN SATURATION: 95 % | TEMPERATURE: 98.5 F | HEART RATE: 100 BPM | DIASTOLIC BLOOD PRESSURE: 80 MMHG | WEIGHT: 171.3 LBS | BODY MASS INDEX: 30.35 KG/M2 | HEIGHT: 63 IN

## 2019-03-19 DIAGNOSIS — J01.00 SUBACUTE MAXILLARY SINUSITIS: ICD-10-CM

## 2019-03-19 DIAGNOSIS — B07.9 VIRAL WART ON LEFT THUMB: ICD-10-CM

## 2019-03-19 DIAGNOSIS — J30.2 OTHER SEASONAL ALLERGIC RHINITIS: ICD-10-CM

## 2019-03-19 PROBLEM — J32.9 SINUSITIS: Status: ACTIVE | Noted: 2019-03-19

## 2019-03-19 PROCEDURE — 99214 OFFICE O/P EST MOD 30 MIN: CPT | Mod: 25 | Performed by: FAMILY MEDICINE

## 2019-03-19 PROCEDURE — 17110 DESTRUCTION B9 LES UP TO 14: CPT | Performed by: FAMILY MEDICINE

## 2019-03-19 RX ORDER — AMOXICILLIN AND CLAVULANATE POTASSIUM 875; 125 MG/1; MG/1
1 TABLET, FILM COATED ORAL 2 TIMES DAILY
Qty: 20 TAB | Refills: 0 | Status: SHIPPED | OUTPATIENT
Start: 2019-03-19 | End: 2020-12-03

## 2019-03-19 RX ORDER — FLUTICASONE PROPIONATE 50 MCG
2 SPRAY, SUSPENSION (ML) NASAL DAILY
Qty: 16 G | Refills: 11 | Status: SHIPPED | OUTPATIENT
Start: 2019-03-19 | End: 2019-12-02 | Stop reason: SDUPTHER

## 2019-03-19 ASSESSMENT — PATIENT HEALTH QUESTIONNAIRE - PHQ9: CLINICAL INTERPRETATION OF PHQ2 SCORE: 0

## 2019-03-19 NOTE — ASSESSMENT & PLAN NOTE
Had her teeth cleaned in January and then her teeth started hurting.  She saw the dentist and he filed down her cap 2 years ago.  Still having left maxillary pain.  Lots of rhinorrhea.  No cough or shortness of breath.  Patient does have a history of allergies but this feels different.

## 2019-03-19 NOTE — PATIENT INSTRUCTIONS
Cryosurgery for Skin Conditions, Care After  Refer to this sheet in the next few weeks. These instructions provide you with information on caring for yourself after your procedure. Your health care provider may also give you more specific instructions. Your treatment has been planned according to current medical practices, but problems sometimes occur. Call your health care provider if you have any problems or questions after your procedure.  WHAT TO EXPECT AFTER THE PROCEDURE  After your procedure, it is typical to have the following:  · The treated area will become red and swollen shortly after the procedure.  · Within 2-3 days, a blister will form over the treated area. The blister may contain a small amount of blood.  · In about 2 weeks, the blister will break on its own, leaving a scab. The treated area will then heal. After healing, there is usually little or no scarring.  HOME CARE INSTRUCTIONS   · Keep the treated area clean, dry, and covered with a bandage until healed. The area can be cleaned as usual with soap and water.  · You may take showers. If your bandage gets wet, change it right away.  · Do not pick at your blister or try to break it open. This can cause infection and scarring.  · Do not apply any medicine, cream, or lotion to the treated area unless directed to do so by your health care provider.  SEEK MEDICAL CARE IF:   · You have increased pain, swelling, redness, fluid drainage, or bleeding in the treated area.  · Your blister becomes large and painful.     This information is not intended to replace advice given to you by your health care provider. Make sure you discuss any questions you have with your health care provider.     Document Released: 07/07/2006 Document Revised: 08/20/2014 Document Reviewed: 07/18/2014  [employer] Interactive Patient Education ©2016 [employer]

## 2019-03-26 NOTE — ASSESSMENT & PLAN NOTE
Patient complains of a wart on her left thumb that she would like frozen off.  We have frozen it in the past and that took care of it for a year but it has returned.

## 2019-03-26 NOTE — PROGRESS NOTES
Subjective:     Chief Complaint   Patient presents with   • Sinus Problem     x 1 month    • Warts     L Thumb       Gali Broderick is a 73 y.o. female here today for evaluation and management of:    Subacute maxillary sinusitis  Had her teeth cleaned in January and then her teeth started hurting.  She saw the dentist and he filed down her cap 2 years ago.  Still having left maxillary pain.  Lots of rhinorrhea.  No cough or shortness of breath.  Patient does have a history of allergies but this feels different.    Viral wart on left thumb  Patient complains of a wart on her left thumb that she would like frozen off.  We have frozen it in the past and that took care of it for a year but it has returned.       Allergies   Allergen Reactions   • Iodine Swelling       Current medicines (including changes today)  Current Outpatient Prescriptions   Medication Sig Dispense Refill   • Multiple Vitamins-Minerals (MULTIVITAL PO) Take  by mouth.     • fluticasone (FLONASE) 50 MCG/ACT nasal spray Spray 2 Sprays in nose every day. 16 g 11   • amoxicillin-clavulanate (AUGMENTIN) 875-125 MG Tab Take 1 Tab by mouth 2 times a day. 20 Tab 0   • mupirocin (BACTROBAN) 2 % Ointment Apply to affected area TID prn 15 g 1     No current facility-administered medications for this visit.        She  has a past medical history of Allergy; Chickenpox; Measles; and Mumps.    Patient Active Problem List    Diagnosis Date Noted   • Subacute maxillary sinusitis 03/19/2019   • Mild aortic insufficiency 11/29/2018   • Chronic pain of both knees 11/29/2018   • Viral wart on left thumb 03/02/2018   • History of bronchitis 03/02/2018   • Actinic keratosis 08/29/2016   • Other hyperlipidemia 08/29/2016   • Other seasonal allergic rhinitis 05/09/2016       ROS   No fever or chills.  No nausea or vomiting.  No chest pain or palpitations.  No cough or SOB.  No pain with urination or hematuria.  No black or bloody stools.       Objective:     Blood  "pressure 140/80, pulse 100, temperature 36.9 °C (98.5 °F), temperature source Temporal, resp. rate 20, height 1.6 m (5' 3\"), weight 77.7 kg (171 lb 4.8 oz), SpO2 95 %, not currently breastfeeding. Body mass index is 30.34 kg/m².   Physical Exam:  Well developed, well nourished.  Alert, oriented in no acute distress.  Eye contact is good, speech goal directed, affect calm  Eyes: conjunctiva non-injected, sclera non-icteric.  Ears: Pinna normal. TM pearly gray.   Nose: Nares are patent.  Erythematous, swollen mucosa with yellow discharge  Sinuses tender to percussion in the bilateral maxillary sinuses  Mouth: Oral mucous membranes pink and moist with no lesions.  Moderate diffuse erythema of the posterior pharynx with yellow postnasal drainage  Neck Supple.  No adenopathy or masses in the neck or supraclavicular regions. No thyromegaly  Lungs: clear to auscultation bilaterally with good excursion. No wheezes or rhonchi  CV: regular rate and rhythm. No murmur  Left thumb: 1 cm verrucous lesion on the dorsum of the left thumb at the PIP joint          Assessment and Plan:   The following treatment plan was discussed    1. Subacute maxillary sinusitis  Augmentin as directed.  Increase fluids and rest.  Call if not improving  - amoxicillin-clavulanate (AUGMENTIN) 875-125 MG Tab; Take 1 Tab by mouth 2 times a day.  Dispense: 20 Tab; Refill: 0    2. Other seasonal allergic rhinitis  Refill Flonase for daily use  - fluticasone (FLONASE) 50 MCG/ACT nasal spray; Spray 2 Sprays in nose every day.  Dispense: 16 g; Refill: 11    3. Viral wart on left thumb  CRYOTHERAPY:  Discussed risks and benefits of cryotherapy. Patient verbally agreed. 3 applications of cryotherapy were applied to 1 lesion on left dorsum of the thumb. Patient tolerated procedure well. Aftercare instructions given.      Any change or worsening of signs or symptoms, patient encouraged to follow-up or report to the emergency room for further evaluation. Patient " understands and agrees.    Followup: Return in about 1 year (around 3/19/2020), or if symptoms worsen or fail to improve.

## 2019-04-24 ENCOUNTER — PATIENT MESSAGE (OUTPATIENT)
Dept: MEDICAL GROUP | Facility: LAB | Age: 74
End: 2019-04-24

## 2019-04-24 RX ORDER — AZITHROMYCIN 250 MG/1
TABLET, FILM COATED ORAL
Qty: 6 TAB | Refills: 0 | Status: SHIPPED | OUTPATIENT
Start: 2019-04-24 | End: 2019-04-25

## 2019-04-25 ENCOUNTER — PATIENT MESSAGE (OUTPATIENT)
Dept: MEDICAL GROUP | Facility: LAB | Age: 74
End: 2019-04-25

## 2019-04-25 RX ORDER — AZITHROMYCIN 250 MG/1
TABLET, FILM COATED ORAL
Qty: 6 TAB | Refills: 0 | Status: SHIPPED | OUTPATIENT
Start: 2019-04-25 | End: 2019-04-30

## 2019-12-02 ENCOUNTER — HOSPITAL ENCOUNTER (OUTPATIENT)
Dept: LAB | Facility: MEDICAL CENTER | Age: 74
End: 2019-12-02
Attending: FAMILY MEDICINE
Payer: MEDICARE

## 2019-12-02 ENCOUNTER — OFFICE VISIT (OUTPATIENT)
Dept: MEDICAL GROUP | Facility: LAB | Age: 74
End: 2019-12-02
Payer: MEDICARE

## 2019-12-02 VITALS
BODY MASS INDEX: 30.48 KG/M2 | OXYGEN SATURATION: 94 % | WEIGHT: 172 LBS | HEART RATE: 76 BPM | SYSTOLIC BLOOD PRESSURE: 140 MMHG | HEIGHT: 63 IN | TEMPERATURE: 97.9 F | DIASTOLIC BLOOD PRESSURE: 78 MMHG | RESPIRATION RATE: 14 BRPM

## 2019-12-02 DIAGNOSIS — Z13.0 SCREENING FOR DEFICIENCY ANEMIA: ICD-10-CM

## 2019-12-02 DIAGNOSIS — Z00.00 MEDICARE ANNUAL WELLNESS VISIT, SUBSEQUENT: ICD-10-CM

## 2019-12-02 DIAGNOSIS — J30.2 OTHER SEASONAL ALLERGIC RHINITIS: ICD-10-CM

## 2019-12-02 DIAGNOSIS — E78.49 OTHER HYPERLIPIDEMIA: ICD-10-CM

## 2019-12-02 DIAGNOSIS — Z13.1 SCREENING FOR DIABETES MELLITUS: ICD-10-CM

## 2019-12-02 DIAGNOSIS — I35.1 MILD AORTIC INSUFFICIENCY: ICD-10-CM

## 2019-12-02 PROBLEM — J01.00 SUBACUTE MAXILLARY SINUSITIS: Status: RESOLVED | Noted: 2019-03-19 | Resolved: 2019-12-02

## 2019-12-02 PROBLEM — Z87.09 HISTORY OF BRONCHITIS: Status: RESOLVED | Noted: 2018-03-02 | Resolved: 2019-12-02

## 2019-12-02 LAB
ALBUMIN SERPL BCP-MCNC: 4.2 G/DL (ref 3.2–4.9)
ALBUMIN/GLOB SERPL: 1.5 G/DL
ALP SERPL-CCNC: 78 U/L (ref 30–99)
ALT SERPL-CCNC: 16 U/L (ref 2–50)
ANION GAP SERPL CALC-SCNC: 8 MMOL/L (ref 0–11.9)
AST SERPL-CCNC: 18 U/L (ref 12–45)
BASOPHILS # BLD AUTO: 0.9 % (ref 0–1.8)
BASOPHILS # BLD: 0.04 K/UL (ref 0–0.12)
BILIRUB SERPL-MCNC: 0.4 MG/DL (ref 0.1–1.5)
BUN SERPL-MCNC: 24 MG/DL (ref 8–22)
CALCIUM SERPL-MCNC: 8.8 MG/DL (ref 8.5–10.5)
CHLORIDE SERPL-SCNC: 108 MMOL/L (ref 96–112)
CHOLEST SERPL-MCNC: 208 MG/DL (ref 100–199)
CO2 SERPL-SCNC: 26 MMOL/L (ref 20–33)
CREAT SERPL-MCNC: 0.96 MG/DL (ref 0.5–1.4)
EOSINOPHIL # BLD AUTO: 0.08 K/UL (ref 0–0.51)
EOSINOPHIL NFR BLD: 1.8 % (ref 0–6.9)
ERYTHROCYTE [DISTWIDTH] IN BLOOD BY AUTOMATED COUNT: 44.9 FL (ref 35.9–50)
FASTING STATUS PATIENT QL REPORTED: NORMAL
GLOBULIN SER CALC-MCNC: 2.8 G/DL (ref 1.9–3.5)
GLUCOSE SERPL-MCNC: 88 MG/DL (ref 65–99)
HCT VFR BLD AUTO: 41.7 % (ref 37–47)
HDLC SERPL-MCNC: 61 MG/DL
HGB BLD-MCNC: 13.4 G/DL (ref 12–16)
IMM GRANULOCYTES # BLD AUTO: 0.01 K/UL (ref 0–0.11)
IMM GRANULOCYTES NFR BLD AUTO: 0.2 % (ref 0–0.9)
LDLC SERPL CALC-MCNC: 124 MG/DL
LYMPHOCYTES # BLD AUTO: 1.07 K/UL (ref 1–4.8)
LYMPHOCYTES NFR BLD: 23.5 % (ref 22–41)
MCH RBC QN AUTO: 30.7 PG (ref 27–33)
MCHC RBC AUTO-ENTMCNC: 32.1 G/DL (ref 33.6–35)
MCV RBC AUTO: 95.6 FL (ref 81.4–97.8)
MONOCYTES # BLD AUTO: 0.29 K/UL (ref 0–0.85)
MONOCYTES NFR BLD AUTO: 6.4 % (ref 0–13.4)
NEUTROPHILS # BLD AUTO: 3.07 K/UL (ref 2–7.15)
NEUTROPHILS NFR BLD: 67.2 % (ref 44–72)
NRBC # BLD AUTO: 0 K/UL
NRBC BLD-RTO: 0 /100 WBC
PLATELET # BLD AUTO: 130 K/UL (ref 164–446)
PMV BLD AUTO: 12.5 FL (ref 9–12.9)
POTASSIUM SERPL-SCNC: 4.1 MMOL/L (ref 3.6–5.5)
PROT SERPL-MCNC: 7 G/DL (ref 6–8.2)
RBC # BLD AUTO: 4.36 M/UL (ref 4.2–5.4)
SODIUM SERPL-SCNC: 142 MMOL/L (ref 135–145)
TRIGL SERPL-MCNC: 114 MG/DL (ref 0–149)
WBC # BLD AUTO: 4.6 K/UL (ref 4.8–10.8)

## 2019-12-02 PROCEDURE — 80061 LIPID PANEL: CPT

## 2019-12-02 PROCEDURE — G0439 PPPS, SUBSEQ VISIT: HCPCS | Performed by: FAMILY MEDICINE

## 2019-12-02 PROCEDURE — 84443 ASSAY THYROID STIM HORMONE: CPT

## 2019-12-02 PROCEDURE — 85025 COMPLETE CBC W/AUTO DIFF WBC: CPT

## 2019-12-02 PROCEDURE — 36415 COLL VENOUS BLD VENIPUNCTURE: CPT

## 2019-12-02 PROCEDURE — 80053 COMPREHEN METABOLIC PANEL: CPT

## 2019-12-02 RX ORDER — FLUTICASONE PROPIONATE 50 MCG
2 SPRAY, SUSPENSION (ML) NASAL DAILY
Qty: 16 G | Refills: 11 | Status: SHIPPED | OUTPATIENT
Start: 2019-12-02 | End: 2021-02-22 | Stop reason: SDUPTHER

## 2019-12-02 ASSESSMENT — ACTIVITIES OF DAILY LIVING (ADL): BATHING_REQUIRES_ASSISTANCE: 0

## 2019-12-02 ASSESSMENT — ENCOUNTER SYMPTOMS: GENERAL WELL-BEING: GOOD

## 2019-12-02 ASSESSMENT — PATIENT HEALTH QUESTIONNAIRE - PHQ9: CLINICAL INTERPRETATION OF PHQ2 SCORE: 0

## 2019-12-02 NOTE — PATIENT INSTRUCTIONS
"DASH Eating Plan  DASH stands for \"Dietary Approaches to Stop Hypertension.\" The DASH eating plan is a healthy eating plan that has been shown to reduce high blood pressure (hypertension). Additional health benefits may include reducing the risk of type 2 diabetes mellitus, heart disease, and stroke. The DASH eating plan may also help with weight loss.  What do I need to know about the DASH eating plan?  For the DASH eating plan, you will follow these general guidelines:  · Choose foods with less than 150 milligrams of sodium per serving (as listed on the food label).  · Use salt-free seasonings or herbs instead of table salt or sea salt.  · Check with your health care provider or pharmacist before using salt substitutes.  · Eat lower-sodium products. These are often labeled as \"low-sodium\" or \"no salt added.\"  · Eat fresh foods. Avoid eating a lot of canned foods.  · Eat more vegetables, fruits, and low-fat dairy products.  · Choose whole grains. Look for the word \"whole\" as the first word in the ingredient list.  · Choose fish and skinless chicken or turkey more often than red meat. Limit fish, poultry, and meat to 6 oz (170 g) each day.  · Limit sweets, desserts, sugars, and sugary drinks.  · Choose heart-healthy fats.  · Eat more home-cooked food and less restaurant, buffet, and fast food.  · Limit fried foods.  · Do not bryan foods. Cook foods using methods such as baking, boiling, grilling, and broiling instead.  · When eating at a restaurant, ask that your food be prepared with less salt, or no salt if possible.  What foods can I eat?  Seek help from a dietitian for individual calorie needs.  Grains   Whole grain or whole wheat bread. Brown rice. Whole grain or whole wheat pasta. Quinoa, bulgur, and whole grain cereals. Low-sodium cereals. Corn or whole wheat flour tortillas. Whole grain cornbread. Whole grain crackers. Low-sodium crackers.  Vegetables   Fresh or frozen vegetables (raw, steamed, roasted, or " grilled). Low-sodium or reduced-sodium tomato and vegetable juices. Low-sodium or reduced-sodium tomato sauce and paste. Low-sodium or reduced-sodium canned vegetables.  Fruits   All fresh, canned (in natural juice), or frozen fruits.  Meat and Other Protein Products   Ground beef (85% or leaner), grass-fed beef, or beef trimmed of fat. Skinless chicken or turkey. Ground chicken or turkey. Pork trimmed of fat. All fish and seafood. Eggs. Dried beans, peas, or lentils. Unsalted nuts and seeds. Unsalted canned beans.  Dairy   Low-fat dairy products, such as skim or 1% milk, 2% or reduced-fat cheeses, low-fat ricotta or cottage cheese, or plain low-fat yogurt. Low-sodium or reduced-sodium cheeses.  Fats and Oils   Tub margarines without trans fats. Light or reduced-fat mayonnaise and salad dressings (reduced sodium). Avocado. Safflower, olive, or canola oils. Natural peanut or almond butter.  Other   Unsalted popcorn and pretzels.  The items listed above may not be a complete list of recommended foods or beverages. Contact your dietitian for more options.   What foods are not recommended?  Grains   White bread. White pasta. White rice. Refined cornbread. Bagels and croissants. Crackers that contain trans fat.  Vegetables   Creamed or fried vegetables. Vegetables in a cheese sauce. Regular canned vegetables. Regular canned tomato sauce and paste. Regular tomato and vegetable juices.  Fruits   Canned fruit in light or heavy syrup. Fruit juice.  Meat and Other Protein Products   Fatty cuts of meat. Ribs, chicken wings, mei, sausage, bologna, salami, chitterlings, fatback, hot dogs, bratwurst, and packaged luncheon meats. Salted nuts and seeds. Canned beans with salt.  Dairy   Whole or 2% milk, cream, half-and-half, and cream cheese. Whole-fat or sweetened yogurt. Full-fat cheeses or blue cheese. Nondairy creamers and whipped toppings. Processed cheese, cheese spreads, or cheese curds.  Condiments   Onion and garlic  salt, seasoned salt, table salt, and sea salt. Canned and packaged gravies. Worcestershire sauce. Tartar sauce. Barbecue sauce. Teriyaki sauce. Soy sauce, including reduced sodium. Steak sauce. Fish sauce. Oyster sauce. Cocktail sauce. Horseradish. Ketchup and mustard. Meat flavorings and tenderizers. Bouillon cubes. Hot sauce. Tabasco sauce. Marinades. Taco seasonings. Relishes.  Fats and Oils   Butter, stick margarine, lard, shortening, ghee, and mei fat. Coconut, palm kernel, or palm oils. Regular salad dressings.  Other   Pickles and olives. Salted popcorn and pretzels.  The items listed above may not be a complete list of foods and beverages to avoid. Contact your dietitian for more information.   Where can I find more information?  [employer]: www.nhlbi.nih.gov/health/health-topics/topics/dash/  This information is not intended to replace advice given to you by your health care provider. Make sure you discuss any questions you have with your health care provider.  Document Released: 12/06/2012 Document Revised: 05/25/2017 Document Reviewed: 10/22/2014  Elsevier Interactive Patient Education © 2017 [employer]

## 2019-12-02 NOTE — PROGRESS NOTES
Chief Complaint   Patient presents with   • Medicare Annual Wellness         HPI:  Gali is a 74 y.o. here for Medicare Annual Wellness Visit    Patient does not wanted to further screening or vaccinations    Patient Active Problem List    Diagnosis Date Noted   • Mild aortic insufficiency 11/29/2018   • Chronic pain of both knees 11/29/2018   • Viral wart on left thumb 03/02/2018   • Other hyperlipidemia 08/29/2016   • Other seasonal allergic rhinitis 05/09/2016       Current Outpatient Medications   Medication Sig Dispense Refill   • fluticasone (FLONASE) 50 MCG/ACT nasal spray Spray 2 Sprays in nose every day. 16 g 11   • Multiple Vitamins-Minerals (MULTIVITAL PO) Take  by mouth.     • mupirocin (BACTROBAN) 2 % Ointment Apply to affected area TID prn 15 g 1   • amoxicillin-clavulanate (AUGMENTIN) 875-125 MG Tab Take 1 Tab by mouth 2 times a day. 20 Tab 0     No current facility-administered medications for this visit.         Patient is taking medications as noted in medication list.  Current supplements as per medication list.     Allergies: Iodine    Current social contact/activities: [employer], card games with friends, hiking, spends time with family, book clubs, [employer] sor[employer]     Is patient current with immunizations? No, due for FLU, PREVNAR (PCV13) , TDAP and SHINGRIX (Shingles). Patient is interested in receiving NONE today.    She  reports that she has never smoked. She has never used smokeless tobacco. She reports current alcohol use of about 0.6 oz of alcohol per week. She reports that she does not use drugs.  Counseling given: Not Answered        DPA/Advanced directive: Patient has Advanced Directive on file.     ROS:    Gait: Uses no assistive device   Ostomy: No   Other tubes: No   Amputations: No   Chronic oxygen use No   Last eye exam October 2018, has an upcoming appointment 12/2019   Wears hearing aids: No   : Denies any urinary leakage during the last 6 months        Depression  Screening    Little interest or pleasure in doing things?  0 - not at all  Feeling down, depressed, or hopeless? 0 - not at all  Patient Health Questionnaire Score: 0    If depressive symptoms identified deferred to follow up visit unless specifically addressed in assessment and plan.    Interpretation of PHQ-9 Total Score   Score Severity   1-4 No Depression   5-9 Mild Depression   10-14 Moderate Depression   15-19 Moderately Severe Depression   20-27 Severe Depression    Screening for Cognitive Impairment    Three Minute Recall (village, kitchen, baby)  3/3    Barron clock face with all 12 numbers and set the hands to show 10 past 10.  Yes 5/5  If cognitive concerns identified, deferred for follow up unless specifically addressed in assessment and plan.    Fall Risk Assessment    Has the patient had two or more falls in the last year or any fall with injury in the last year?  No  If fall risk identified, deferred for follow up unless specifically addressed in assessment and plan.    Safety Assessment    Throw rugs on floor.  No  Handrails on all stairs.  Yes  Good lighting in all hallways.  Yes  Difficulty hearing.  No  Patient counseled about all safety risks that were identified.    Functional Assessment ADLs    Are there any barriers preventing you from cooking for yourself or meeting nutritional needs?  No.    Are there any barriers preventing you from driving safely or obtaining transportation?  No.    Are there any barriers preventing you from using a telephone or calling for help?  No.    Are there any barriers preventing you from shopping?  No.    Are there any barriers preventing you from taking care of your own finances?  No.    Are there any barriers preventing you from managing your medications?  No.    Are there any barriers preventing you from showering, bathing or dressing yourself?  No.    Are you currently engaging in any exercise or physical activity?  Yes.  Walking 3 days a week  What is your  "perception of your health?  Good.    Health Maintenance Summary                Annual Wellness Visit Overdue 11/30/2019      Done 11/29/2018 Visit Dx: Medicare annual wellness visit, subsequent     Patient has more history with this topic...    IMM ZOSTER VACCINES Postponed 5/2/2020 Originally 6/9/1993. System: vaccine not available, other system reasons    BONE DENSITY Postponed 12/2/2020 Originally 9/22/2010. Patient Refused     Done 9/22/2005 DS-BONE DENSITY STUDY (DEXA)    IMM INFLUENZA Postponed 12/2/2020 Originally 9/1/2019. Patient Refused    IMM DTaP/Tdap/Td Vaccine Postponed 12/2/2020 Originally 6/9/1956. Patient Refused    IMM PNEUMOCOCCAL VACCINE: 65+ Years Postponed 12/2/2020 Originally 6/9/2008. Patient Refused    MAMMOGRAM Next Due 5/29/2020      Done 5/29/2019 [id number]     Patient has more history with this topic...          Patient Care Team:  Zara Gonzalez M.D. as PCP - General (Family Medicine)  Piotr Jacobson M.D. as Consulting Physician (Dermatology)  Christian Chamberlain M.D. as Consulting Physician (Ophthalmology)  Ty Henley M.D. (Cardiology)    Social History     Tobacco Use   • Smoking status: Never Smoker   • Smokeless tobacco: Never Used   Substance Use Topics   • Alcohol use: Yes     Alcohol/week: 0.6 oz     Types: 1 Glasses of wine per week   • Drug use: No     Family History   Problem Relation Age of Onset   • No Known Problems Mother    • Cancer Father      She  has a past medical history of Allergy, Chickenpox, Measles, and Mumps.   Past Surgical History:   Procedure Laterality Date   • ABDOMINAL HYSTERECTOMY TOTAL     • EYE SURGERY      cataract   • HERNIA REPAIR     • TONSILLECTOMY AND ADENOIDECTOMY             Exam:     /78 (BP Location: Left arm, Patient Position: Sitting, BP Cuff Size: Adult)   Pulse 76   Temp 36.6 °C (97.9 °F) (Temporal)   Resp 14   Ht 1.6 m (5' 2.99\")   Wt 78 kg (172 lb)   SpO2 94%  Body mass index is 30.48 kg/m².    Hearing good.  "   Dentition good  Alert, oriented in no acute distress.  Eye contact is good, speech goal directed, affect calm  Constitutional: Alert, no distress.  Skin: Warm, dry, good turgor, no rashes in visible areas.  Eye: Equal, round and reactive, conjunctiva clear, lids normal.  ENMT: Lips without lesions, good dentition, oropharynx clear.  Neck: Trachea midline, no masses, no thyromegaly. No cervical or supraclavicular lymphadenopathy  Respiratory: Unlabored respiratory effort, lungs clear to auscultation, no wheezes, no ronchi.  Cardiovascular: Normal S1, S2, RRR, 2/6 systolic ejection murmur, no edema.  Abdomen: Soft, non-tender, no masses, no hepatosplenomegaly.  Psych: Alert and oriented x3, normal affect and mood.        Assessment and Plan. The following treatment and monitoring plan is recommended:    1. Medicare annual wellness visit, subsequent  Routine anticipatory guidance.  No special services needed at this time  - Subsequent Annual Wellness Visit - Includes PPPS ()    2. Other seasonal allergic rhinitis  Refill Flonase for daily use  - fluticasone (FLONASE) 50 MCG/ACT nasal spray; Spray 2 Sprays in nose every day.  Dispense: 16 g; Refill: 11  - Subsequent Annual Wellness Visit - Includes PPPS ()    3. Other hyperlipidemia  Recheck labs.  Continue low-fat diet.  Patient refuses statins  - Lipid Profile; Future  - TSH; Future  - Subsequent Annual Wellness Visit - Includes PPPS ()    4. Mild aortic insufficiency  Follow-up with Dr. Cook as scheduled  - Subsequent Annual Wellness Visit - Includes PPPS ()    5. Screening for deficiency anemia  Screening labs ordered.  Await results for counseling.    - CBC WITH DIFFERENTIAL; Future  - Subsequent Annual Wellness Visit - Includes PPPS ()    6. Screening for diabetes mellitus  Screening labs ordered.  Await results for counseling.    - Comp Metabolic Panel; Future  - Subsequent Annual Wellness Visit - Includes PPPS ()      Services  suggested: No services needed at this time  Health Care Screening recommendations as per orders if indicated.  Referrals offered: PT/OT/Nutrition counseling/Behavioral Health/Smoking cessation as per orders if indicated.    Discussion today about general wellness and lifestyle habits:    · Prevent falls and reduce trip hazards; Cautioned about securing or removing rugs.  · Have a working fire alarm and carbon monoxide detector;   · Engage in regular physical activity and social activities       Follow-up: Return in about 1 year (around 12/2/2020).

## 2019-12-03 LAB — TSH SERPL DL<=0.005 MIU/L-ACNC: 1.04 UIU/ML (ref 0.38–5.33)

## 2019-12-04 DIAGNOSIS — D69.6 THROMBOCYTOPENIA (HCC): ICD-10-CM

## 2019-12-04 DIAGNOSIS — D72.819 LEUKOPENIA, UNSPECIFIED TYPE: ICD-10-CM

## 2019-12-04 DIAGNOSIS — N28.9 RENAL DYSFUNCTION: ICD-10-CM

## 2020-12-03 ENCOUNTER — OFFICE VISIT (OUTPATIENT)
Dept: MEDICAL GROUP | Facility: LAB | Age: 75
End: 2020-12-03
Payer: MEDICARE

## 2020-12-03 VITALS
SYSTOLIC BLOOD PRESSURE: 134 MMHG | RESPIRATION RATE: 16 BRPM | TEMPERATURE: 98.2 F | HEART RATE: 94 BPM | DIASTOLIC BLOOD PRESSURE: 80 MMHG | HEIGHT: 63 IN | BODY MASS INDEX: 30.3 KG/M2 | WEIGHT: 171 LBS | OXYGEN SATURATION: 96 %

## 2020-12-03 DIAGNOSIS — E78.49 OTHER HYPERLIPIDEMIA: ICD-10-CM

## 2020-12-03 DIAGNOSIS — J30.2 OTHER SEASONAL ALLERGIC RHINITIS: ICD-10-CM

## 2020-12-03 DIAGNOSIS — R20.2 TINGLING IN EXTREMITIES: ICD-10-CM

## 2020-12-03 DIAGNOSIS — N28.9 RENAL DYSFUNCTION: ICD-10-CM

## 2020-12-03 DIAGNOSIS — I35.1 MILD AORTIC INSUFFICIENCY: ICD-10-CM

## 2020-12-03 DIAGNOSIS — Z00.00 MEDICARE ANNUAL WELLNESS VISIT, SUBSEQUENT: ICD-10-CM

## 2020-12-03 PROCEDURE — G0439 PPPS, SUBSEQ VISIT: HCPCS | Performed by: FAMILY MEDICINE

## 2020-12-03 ASSESSMENT — PATIENT HEALTH QUESTIONNAIRE - PHQ9: CLINICAL INTERPRETATION OF PHQ2 SCORE: 0

## 2020-12-03 ASSESSMENT — ENCOUNTER SYMPTOMS: GENERAL WELL-BEING: FAIR

## 2020-12-03 ASSESSMENT — ACTIVITIES OF DAILY LIVING (ADL): BATHING_REQUIRES_ASSISTANCE: 0

## 2020-12-03 ASSESSMENT — FIBROSIS 4 INDEX: FIB4 SCORE: 2.67

## 2020-12-03 NOTE — PROGRESS NOTES
Chief Complaint   Patient presents with   • Annual Wellness Visit         HPI:  Gali is a 75 y.o. here for Medicare Annual Wellness Visit        Patient Active Problem List    Diagnosis Date Noted   • Tingling in extremities 12/03/2020   • Mild aortic insufficiency 11/29/2018   • Chronic pain of both knees 11/29/2018   • Viral wart on left thumb 03/02/2018   • Other hyperlipidemia 08/29/2016   • Renal dysfunction 08/29/2016   • Other seasonal allergic rhinitis 05/09/2016       Current Outpatient Medications   Medication Sig Dispense Refill   • fluticasone (FLONASE) 50 MCG/ACT nasal spray Spray 2 Sprays in nose every day. 16 g 11   • Multiple Vitamins-Minerals (MULTIVITAL PO) Take  by mouth.     • mupirocin (BACTROBAN) 2 % Ointment Apply to affected area TID prn 15 g 1     No current facility-administered medications for this visit.         Patient is taking medications as noted in medication list.  Current supplements as per medication list.     Allergies: Iodine    Current social contact/activities: text messages and email her friends and family, plays [employer], attends reading groups     Is patient current with immunizations? Yes.    She  reports that she has never smoked. She has never used smokeless tobacco. She reports current alcohol use of about 0.6 oz of alcohol per week. She reports that she does not use drugs.  Counseling given: Not Answered        DPA/Advanced directive: Patient has Advanced Directive on file.     ROS:    Gait: Uses no assistive device   Ostomy: No   Other tubes: No   Amputations: No   Chronic oxygen use No   Last eye exam Oct 2020   Wears hearing aids: No   : Denies any urinary leakage during the last 6 months      Screening:        Depression Screening    Little interest or pleasure in doing things?  0 - not at all  Feeling down, depressed, or hopeless? 0 - not at all  Patient Health Questionnaire Score: 0    If depressive symptoms identified deferred to follow up visit unless  specifically addressed in assessment and plan.    Interpretation of PHQ-9 Total Score   Score Severity   1-4 No Depression   5-9 Mild Depression   10-14 Moderate Depression   15-19 Moderately Severe Depression   20-27 Severe Depression    Screening for Cognitive Impairment    Three Minute Recall ([place], [place], finger)  3/3    Barron clock face with all 12 numbers and set the hands to show 10 past 11.  Yes    If cognitive concerns identified, deferred for follow up unless specifically addressed in assessment and plan.    Fall Risk Assessment    Has the patient had two or more falls in the last year or any fall with injury in the last year?  No  If fall risk identified, deferred for follow up unless specifically addressed in assessment and plan.    Safety Assessment    Throw rugs on floor.  No  Handrails on all stairs.  Yes  Good lighting in all hallways.  Yes  Difficulty hearing.  No  Patient counseled about all safety risks that were identified.    Functional Assessment ADLs    Are there any barriers preventing you from cooking for yourself or meeting nutritional needs?  No.    Are there any barriers preventing you from driving safely or obtaining transportation?  No.    Are there any barriers preventing you from using a telephone or calling for help?  No.    Are there any barriers preventing you from shopping?  No.    Are there any barriers preventing you from taking care of your own finances?  No.    Are there any barriers preventing you from managing your medications?  No.    Are there any barriers preventing you from showering, bathing or dressing yourself?  No.    Are you currently engaging in any exercise or physical activity?  Yes.  walking  What is your perception of your health?  Fair.    Health Maintenance Summary                IMM DTaP/Tdap/Td Vaccine Overdue 6/9/1962     IMM ZOSTER VACCINES Overdue 6/9/1993     IMM PNEUMOCOCCAL VACCINE: 65+ Years Overdue 6/9/2008     BONE DENSITY Overdue 9/22/2010       "Done 9/22/2005 DS-BONE DENSITY STUDY (DEXA)    MAMMOGRAM Overdue 5/29/2020      Done 5/29/2019 [id number]     Patient has more history with this topic...    IMM INFLUENZA Overdue 9/1/2020     Annual Wellness Visit Next Due 12/4/2021      Done 12/3/2020 Visit Dx: Medicare annual wellness visit, subsequent     Patient has more history with this topic...          Patient Care Team:  Zara Gonzalez M.D. as PCP - General (Family Medicine)  Piotr Jacobson M.D. as Consulting Physician (Dermatology)  Christian Chamberlain M.D. as Consulting Physician (Ophthalmology)  Ty Henley M.D. (Cardiology)    Social History     Tobacco Use   • Smoking status: Never Smoker   • Smokeless tobacco: Never Used   Substance Use Topics   • Alcohol use: Yes     Alcohol/week: 0.6 oz     Types: 1 Glasses of wine per week   • Drug use: No     Family History   Problem Relation Age of Onset   • No Known Problems Mother    • Cancer Father      She  has a past medical history of Allergy, Chickenpox, Measles, and Mumps.   Past Surgical History:   Procedure Laterality Date   • ABDOMINAL HYSTERECTOMY TOTAL     • EYE SURGERY      cataract   • HERNIA REPAIR     • TONSILLECTOMY AND ADENOIDECTOMY             Exam:     /80 (BP Location: Right arm, Patient Position: Sitting, BP Cuff Size: Adult)   Pulse 94   Temp 36.8 °C (98.2 °F) (Temporal)   Resp 16   Ht 1.6 m (5' 3\")   Wt 77.6 kg (171 lb)   SpO2 96%  Body mass index is 30.29 kg/m².    Hearing good.    Alert, oriented in no acute distress.  Eye contact is good, speech goal directed, affect calm  Constitutional: Alert, no distress.  Skin: Warm, dry, good turgor, no rashes in visible areas.  Eye: Equal, round and reactive, conjunctiva clear, lids normal.  ENMT: Pinna normal.  TMs clear bilaterally  Neck: Trachea midline, no masses, no thyromegaly. No cervical or supraclavicular lymphadenopathy  Respiratory: Unlabored respiratory effort, lungs clear to auscultation, no wheezes, no " elis.  Cardiovascular: Normal S1, S2, RRR, 2/6 systolic murmur, no edema.  Abdomen: Soft, non-tender, no masses, no hepatosplenomegaly.  Psych: Alert and oriented x3, normal affect and mood.        Assessment and Plan. The following treatment and monitoring plan is recommended:    1. Medicare annual wellness visit, subsequent  Routine anticipatory guidance.  No special services needed at this time    2. Other seasonal allergic rhinitis  Continue OTC medications as needed    3. Other hyperlipidemia  Check labs.  Await results.  Mediterranean diet information discussed  - Lipid Profile; Future  - TSH; Future    4. Mild aortic insufficiency  Continue to monitor    5. Tingling in extremities  Check labs.  Await results.  - CBC WITH DIFFERENTIAL; Future  - Comp Metabolic Panel; Future  - TSH; Future  - VITAMIN B12; Future    6. Renal dysfunction  Check labs.  Await results.  Avoid high dose NSAIDs.  - CBC WITH DIFFERENTIAL; Future  - Comp Metabolic Panel; Future      Services suggested: No services needed at this time  Health Care Screening recommendations as per orders if indicated.  Referrals offered: PT/OT/Nutrition counseling/Behavioral Health/Smoking cessation as per orders if indicated.    Discussion today about general wellness and lifestyle habits:    · Prevent falls and reduce trip hazards; Cautioned about securing or removing rugs.  · Have a working fire alarm and carbon monoxide detector;   · Engage in regular physical activity and social activities       Follow-up: Return in about 1 year (around 12/3/2021).

## 2020-12-09 ENCOUNTER — HOSPITAL ENCOUNTER (OUTPATIENT)
Dept: LAB | Facility: MEDICAL CENTER | Age: 75
End: 2020-12-09
Attending: FAMILY MEDICINE
Payer: MEDICARE

## 2020-12-09 DIAGNOSIS — R20.2 TINGLING IN EXTREMITIES: ICD-10-CM

## 2020-12-09 DIAGNOSIS — E78.49 OTHER HYPERLIPIDEMIA: ICD-10-CM

## 2020-12-09 DIAGNOSIS — N28.9 RENAL DYSFUNCTION: ICD-10-CM

## 2020-12-09 LAB
ALBUMIN SERPL BCP-MCNC: 4.3 G/DL (ref 3.2–4.9)
ALBUMIN/GLOB SERPL: 1.4 G/DL
ALP SERPL-CCNC: 94 U/L (ref 30–99)
ALT SERPL-CCNC: 14 U/L (ref 2–50)
ANION GAP SERPL CALC-SCNC: 9 MMOL/L (ref 7–16)
AST SERPL-CCNC: 15 U/L (ref 12–45)
BASOPHILS # BLD AUTO: 0.6 % (ref 0–1.8)
BASOPHILS # BLD: 0.03 K/UL (ref 0–0.12)
BILIRUB SERPL-MCNC: 0.4 MG/DL (ref 0.1–1.5)
BUN SERPL-MCNC: 24 MG/DL (ref 8–22)
CALCIUM SERPL-MCNC: 9.3 MG/DL (ref 8.5–10.5)
CHLORIDE SERPL-SCNC: 106 MMOL/L (ref 96–112)
CHOLEST SERPL-MCNC: 217 MG/DL (ref 100–199)
CO2 SERPL-SCNC: 26 MMOL/L (ref 20–33)
CREAT SERPL-MCNC: 0.86 MG/DL (ref 0.5–1.4)
EOSINOPHIL # BLD AUTO: 0.11 K/UL (ref 0–0.51)
EOSINOPHIL NFR BLD: 2.4 % (ref 0–6.9)
ERYTHROCYTE [DISTWIDTH] IN BLOOD BY AUTOMATED COUNT: 42.9 FL (ref 35.9–50)
FASTING STATUS PATIENT QL REPORTED: NORMAL
GLOBULIN SER CALC-MCNC: 3 G/DL (ref 1.9–3.5)
GLUCOSE SERPL-MCNC: 95 MG/DL (ref 65–99)
HCT VFR BLD AUTO: 42.9 % (ref 37–47)
HDLC SERPL-MCNC: 63 MG/DL
HGB BLD-MCNC: 13.7 G/DL (ref 12–16)
IMM GRANULOCYTES # BLD AUTO: 0.01 K/UL (ref 0–0.11)
IMM GRANULOCYTES NFR BLD AUTO: 0.2 % (ref 0–0.9)
LDLC SERPL CALC-MCNC: 131 MG/DL
LYMPHOCYTES # BLD AUTO: 1.04 K/UL (ref 1–4.8)
LYMPHOCYTES NFR BLD: 22.5 % (ref 22–41)
MCH RBC QN AUTO: 29.8 PG (ref 27–33)
MCHC RBC AUTO-ENTMCNC: 31.9 G/DL (ref 33.6–35)
MCV RBC AUTO: 93.5 FL (ref 81.4–97.8)
MONOCYTES # BLD AUTO: 0.36 K/UL (ref 0–0.85)
MONOCYTES NFR BLD AUTO: 7.8 % (ref 0–13.4)
NEUTROPHILS # BLD AUTO: 3.08 K/UL (ref 2–7.15)
NEUTROPHILS NFR BLD: 66.5 % (ref 44–72)
NRBC # BLD AUTO: 0 K/UL
NRBC BLD-RTO: 0 /100 WBC
PLATELET # BLD AUTO: 165 K/UL (ref 164–446)
PMV BLD AUTO: 11.6 FL (ref 9–12.9)
POTASSIUM SERPL-SCNC: 4.2 MMOL/L (ref 3.6–5.5)
PROT SERPL-MCNC: 7.3 G/DL (ref 6–8.2)
RBC # BLD AUTO: 4.59 M/UL (ref 4.2–5.4)
SODIUM SERPL-SCNC: 141 MMOL/L (ref 135–145)
TRIGL SERPL-MCNC: 115 MG/DL (ref 0–149)
TSH SERPL DL<=0.005 MIU/L-ACNC: 1.45 UIU/ML (ref 0.38–5.33)
VIT B12 SERPL-MCNC: 953 PG/ML (ref 211–911)
WBC # BLD AUTO: 4.6 K/UL (ref 4.8–10.8)

## 2020-12-09 PROCEDURE — 85025 COMPLETE CBC W/AUTO DIFF WBC: CPT

## 2020-12-09 PROCEDURE — 36415 COLL VENOUS BLD VENIPUNCTURE: CPT

## 2020-12-09 PROCEDURE — 84443 ASSAY THYROID STIM HORMONE: CPT

## 2020-12-09 PROCEDURE — 80061 LIPID PANEL: CPT

## 2020-12-09 PROCEDURE — 80053 COMPREHEN METABOLIC PANEL: CPT

## 2020-12-09 PROCEDURE — 82607 VITAMIN B-12: CPT

## 2021-01-11 DIAGNOSIS — Z23 NEED FOR VACCINATION: ICD-10-CM

## 2021-02-22 DIAGNOSIS — J30.2 OTHER SEASONAL ALLERGIC RHINITIS: ICD-10-CM

## 2021-02-22 RX ORDER — FLUTICASONE PROPIONATE 50 MCG
2 SPRAY, SUSPENSION (ML) NASAL DAILY
Qty: 16 G | Refills: 11 | Status: SHIPPED | OUTPATIENT
Start: 2021-02-22 | End: 2021-12-07 | Stop reason: SDUPTHER

## 2021-02-22 NOTE — TELEPHONE ENCOUNTER
Received request via: Patient    Was the patient seen in the last year in this department? Yes  12/3/20  Does the patient have an active prescription (recently filled or refills available) for medication(s) requested? No

## 2021-02-22 NOTE — TELEPHONE ENCOUNTER
----- Message from Gali Broderick sent at 2/22/2021  8:03 AM PST -----  Regarding: Prescription Question  Contact: [phone]  Dr. Gonzalez   Can I get a renewal on Fluticasone Propionate Nasall Spray.  I am still with [facility] on [facility]

## 2021-03-11 ENCOUNTER — HOSPITAL ENCOUNTER (OUTPATIENT)
Dept: LAB | Facility: MEDICAL CENTER | Age: 76
End: 2021-03-11
Attending: INTERNAL MEDICINE
Payer: MEDICARE

## 2021-03-11 LAB
ALBUMIN SERPL BCP-MCNC: 4.4 G/DL (ref 3.2–4.9)
ALBUMIN/GLOB SERPL: 1.5 G/DL
ALP SERPL-CCNC: 106 U/L (ref 30–99)
ALT SERPL-CCNC: 25 U/L (ref 2–50)
ANION GAP SERPL CALC-SCNC: 9 MMOL/L (ref 7–16)
AST SERPL-CCNC: 24 U/L (ref 12–45)
BASOPHILS # BLD AUTO: 1.1 % (ref 0–1.8)
BASOPHILS # BLD: 0.05 K/UL (ref 0–0.12)
BILIRUB SERPL-MCNC: 0.4 MG/DL (ref 0.1–1.5)
BUN SERPL-MCNC: 20 MG/DL (ref 8–22)
CALCIUM SERPL-MCNC: 9.3 MG/DL (ref 8.5–10.5)
CHLORIDE SERPL-SCNC: 106 MMOL/L (ref 96–112)
CHOLEST SERPL-MCNC: 191 MG/DL (ref 100–199)
CO2 SERPL-SCNC: 26 MMOL/L (ref 20–33)
CREAT SERPL-MCNC: 0.79 MG/DL (ref 0.5–1.4)
EOSINOPHIL # BLD AUTO: 0.11 K/UL (ref 0–0.51)
EOSINOPHIL NFR BLD: 2.4 % (ref 0–6.9)
ERYTHROCYTE [DISTWIDTH] IN BLOOD BY AUTOMATED COUNT: 46.2 FL (ref 35.9–50)
FASTING STATUS PATIENT QL REPORTED: NORMAL
GLOBULIN SER CALC-MCNC: 2.9 G/DL (ref 1.9–3.5)
GLUCOSE SERPL-MCNC: 95 MG/DL (ref 65–99)
HCT VFR BLD AUTO: 42.2 % (ref 37–47)
HDLC SERPL-MCNC: 64 MG/DL
HGB BLD-MCNC: 13.5 G/DL (ref 12–16)
IMM GRANULOCYTES # BLD AUTO: 0.02 K/UL (ref 0–0.11)
IMM GRANULOCYTES NFR BLD AUTO: 0.4 % (ref 0–0.9)
LDLC SERPL CALC-MCNC: 107 MG/DL
LYMPHOCYTES # BLD AUTO: 1.13 K/UL (ref 1–4.8)
LYMPHOCYTES NFR BLD: 24.1 % (ref 22–41)
MCH RBC QN AUTO: 29.5 PG (ref 27–33)
MCHC RBC AUTO-ENTMCNC: 32 G/DL (ref 33.6–35)
MCV RBC AUTO: 92.3 FL (ref 81.4–97.8)
MONOCYTES # BLD AUTO: 0.31 K/UL (ref 0–0.85)
MONOCYTES NFR BLD AUTO: 6.6 % (ref 0–13.4)
NEUTROPHILS # BLD AUTO: 3.06 K/UL (ref 2–7.15)
NEUTROPHILS NFR BLD: 65.4 % (ref 44–72)
NRBC # BLD AUTO: 0 K/UL
NRBC BLD-RTO: 0 /100 WBC
PLATELET # BLD AUTO: 172 K/UL (ref 164–446)
PMV BLD AUTO: 11.5 FL (ref 9–12.9)
POTASSIUM SERPL-SCNC: 4.2 MMOL/L (ref 3.6–5.5)
PROT SERPL-MCNC: 7.3 G/DL (ref 6–8.2)
RBC # BLD AUTO: 4.57 M/UL (ref 4.2–5.4)
SODIUM SERPL-SCNC: 141 MMOL/L (ref 135–145)
T4 FREE SERPL-MCNC: 1.3 NG/DL (ref 0.93–1.7)
TRIGL SERPL-MCNC: 99 MG/DL (ref 0–149)
WBC # BLD AUTO: 4.7 K/UL (ref 4.8–10.8)

## 2021-03-11 PROCEDURE — 36415 COLL VENOUS BLD VENIPUNCTURE: CPT | Mod: GA

## 2021-03-11 PROCEDURE — 80061 LIPID PANEL: CPT

## 2021-03-11 PROCEDURE — 84439 ASSAY OF FREE THYROXINE: CPT | Mod: GA

## 2021-03-11 PROCEDURE — 80053 COMPREHEN METABOLIC PANEL: CPT

## 2021-03-11 PROCEDURE — 85025 COMPLETE CBC W/AUTO DIFF WBC: CPT

## 2021-03-19 ENCOUNTER — PATIENT MESSAGE (OUTPATIENT)
Dept: MEDICAL GROUP | Facility: LAB | Age: 76
End: 2021-03-19

## 2021-03-19 DIAGNOSIS — E78.49 OTHER HYPERLIPIDEMIA: ICD-10-CM

## 2021-03-19 DIAGNOSIS — I35.1 MILD AORTIC INSUFFICIENCY: ICD-10-CM

## 2021-03-31 ENCOUNTER — OFFICE VISIT (OUTPATIENT)
Dept: MEDICAL GROUP | Facility: LAB | Age: 76
End: 2021-03-31
Payer: MEDICARE

## 2021-03-31 VITALS
BODY MASS INDEX: 30.48 KG/M2 | SYSTOLIC BLOOD PRESSURE: 116 MMHG | WEIGHT: 172 LBS | DIASTOLIC BLOOD PRESSURE: 80 MMHG | HEIGHT: 63 IN | RESPIRATION RATE: 16 BRPM | TEMPERATURE: 98.9 F

## 2021-03-31 DIAGNOSIS — H60.501 ACUTE OTITIS EXTERNA OF RIGHT EAR, UNSPECIFIED TYPE: ICD-10-CM

## 2021-03-31 PROCEDURE — 99213 OFFICE O/P EST LOW 20 MIN: CPT | Performed by: FAMILY MEDICINE

## 2021-03-31 ASSESSMENT — PATIENT HEALTH QUESTIONNAIRE - PHQ9: CLINICAL INTERPRETATION OF PHQ2 SCORE: 0

## 2021-03-31 ASSESSMENT — FIBROSIS 4 INDEX: FIB4 SCORE: 2.15

## 2021-04-07 NOTE — PROGRESS NOTES
"Subjective:     Chief Complaint   Patient presents with   • Ear Fullness     right ear       Gali Broderick is a 75 y.o. female here today for evaluation and management of:    Patient complains of some pressure and pain in her right ear.  This is been going on for a couple weeks and has not seemed to be improving.  She denies any drainage.  She did wash her ears out and got a fair amount of wax out and she is unsure if this caused a problem.  She reports that it was very hot water that she used and the pain started after that.    Allergies   Allergen Reactions   • Iodine Swelling       Current medicines (including changes today)  Current Outpatient Medications   Medication Sig Dispense Refill   • fluticasone (FLONASE) 50 MCG/ACT nasal spray Administer 2 Sprays into affected nostril(S) every day. 16 g 11   • Multiple Vitamins-Minerals (MULTIVITAL PO) Take  by mouth.     • mupirocin (BACTROBAN) 2 % Ointment Apply to affected area TID prn 15 g 1     No current facility-administered medications for this visit.       She  has a past medical history of Allergy, Chickenpox, Measles, and Mumps.    Patient Active Problem List    Diagnosis Date Noted   • Tingling in extremities 12/03/2020   • Mild aortic insufficiency 11/29/2018   • Chronic pain of both knees 11/29/2018   • Viral wart on left thumb 03/02/2018   • Other hyperlipidemia 08/29/2016   • Renal dysfunction 08/29/2016   • Other seasonal allergic rhinitis 05/09/2016       ROS   No fever or chills.  No nausea or vomiting.  No chest pain or palpitations.  No cough or SOB.  No pain with urination or hematuria.  No black or bloody stools.       Objective:     /80 (BP Location: Right arm, Patient Position: Sitting, BP Cuff Size: Adult)   Temp 37.2 °C (98.9 °F) (Temporal)   Resp 16   Ht 1.6 m (5' 3\")   Wt 78 kg (172 lb)  Body mass index is 30.47 kg/m².   Physical Exam:  Well developed, well nourished.  Alert, oriented in no acute distress.  Eye contact is good, " speech goal directed, affect calm  Eyes: conjunctiva non-injected, sclera non-icteric.  Ears: Pinna normal.  Right ear with abrasion versus burn in the canal  Neck Supple.  No adenopathy or masses in the neck or supraclavicular regions. No thyromegaly  Lungs: clear to auscultation bilaterally with good excursion. No wheezes or rhonchi  CV: regular rate and rhythm. No murmur          Assessment and Plan:   The following treatment plan was discussed    1. Acute otitis externa of right ear, unspecified type  Cortisporin otic as directed.  There is a little erythema in the left so we will have her put it in both ears.  If pain persists she will give me a call.  - neomycin sulf/polymyx B sulf/HC soln (CORTISPORIN HC SOL) [id number] Solution; Administer 3 Drops into the right ear 4 times a day for 5 days. Administer drops to both ears.  Dispense: 10 mL; Refill: 0    Any change or worsening of signs or symptoms, patient encouraged to follow-up or report to the emergency room for further evaluation. Patient understands and agrees.    Followup: Return if symptoms worsen or fail to improve.

## 2021-08-24 ENCOUNTER — OFFICE VISIT (OUTPATIENT)
Dept: MEDICAL GROUP | Facility: LAB | Age: 76
End: 2021-08-24
Payer: MEDICARE

## 2021-08-24 VITALS
WEIGHT: 171 LBS | SYSTOLIC BLOOD PRESSURE: 134 MMHG | RESPIRATION RATE: 16 BRPM | HEIGHT: 63 IN | OXYGEN SATURATION: 97 % | HEART RATE: 88 BPM | BODY MASS INDEX: 30.3 KG/M2 | TEMPERATURE: 98.9 F | DIASTOLIC BLOOD PRESSURE: 80 MMHG

## 2021-08-24 DIAGNOSIS — H69.91 DYSFUNCTION OF RIGHT EUSTACHIAN TUBE: ICD-10-CM

## 2021-08-24 PROCEDURE — 99214 OFFICE O/P EST MOD 30 MIN: CPT | Performed by: FAMILY MEDICINE

## 2021-08-24 RX ORDER — METHYLPREDNISOLONE 4 MG/1
TABLET ORAL
Qty: 21 TABLET | Refills: 0 | Status: SHIPPED | OUTPATIENT
Start: 2021-08-24 | End: 2021-12-07

## 2021-08-24 ASSESSMENT — FIBROSIS 4 INDEX: FIB4 SCORE: 2.18

## 2021-08-24 NOTE — PATIENT INSTRUCTIONS
Eustachian Tube Dysfunction    Eustachian tube dysfunction refers to a condition in which a blockage develops in the narrow passage that connects the middle ear to the back of the nose (eustachian tube). The eustachian tube regulates air pressure in the middle ear by letting air move between the ear and nose. It also helps to drain fluid from the middle ear space.  Eustachian tube dysfunction can affect one or both ears. When the eustachian tube does not function properly, air pressure, fluid, or both can build up in the middle ear.  What are the causes?  This condition occurs when the eustachian tube becomes blocked or cannot open normally. Common causes of this condition include:  · Ear infections.  · Colds and other infections that affect the nose, mouth, and throat (upper respiratory tract).  · Allergies.  · Irritation from cigarette smoke.  · Irritation from stomach acid coming up into the esophagus (gastroesophageal reflux). The esophagus is the tube that carries food from the mouth to the stomach.  · Sudden changes in air pressure, such as from descending in an airplane or scuba diving.  · Abnormal growths in the nose or throat, such as:  ? Growths that line the nose (nasal polyps).  ? Abnormal growth of cells (tumors).  ? Enlarged tissue at the back of the throat (adenoids).  What increases the risk?  You are more likely to develop this condition if:  · You smoke.  · You are overweight.  · You are a child who has:  ? Certain birth defects of the mouth, such as cleft palate.  ? Large tonsils or adenoids.  What are the signs or symptoms?  Common symptoms of this condition include:  · A feeling of fullness in the ear.  · Ear pain.  · Clicking or popping noises in the ear.  · Ringing in the ear.  · Hearing loss.  · Loss of balance.  · Dizziness.  Symptoms may get worse when the air pressure around you changes, such as when you travel to an area of high elevation, fly on an airplane, or go scuba diving.  How is  "this diagnosed?  This condition may be diagnosed based on:  · Your symptoms.  · A physical exam of your ears, nose, and throat.  · Tests, such as those that measure:  ? The movement of your eardrum (tympanogram).  ? Your hearing (audiometry).  How is this treated?  Treatment depends on the cause and severity of your condition.  · In mild cases, you may relieve your symptoms by moving air into your ears. This is called \"popping the ears.\"  · In more severe cases, or if you have symptoms of fluid in your ears, treatment may include:  ? Medicines to relieve congestion (decongestants).  ? Medicines that treat allergies (antihistamines).  ? Nasal sprays or ear drops that contain medicines that reduce swelling (steroids).  ? A procedure to drain the fluid in your eardrum (myringotomy). In this procedure, a small tube is placed in the eardrum to:  § Drain the fluid.  § Restore the air in the middle ear space.  ? A procedure to insert a balloon device through the nose to inflate the opening of the eustachian tube (balloon dilation).  Follow these instructions at home:  Lifestyle  · Do not do any of the following until your health care provider approves:  ? Travel to high altitudes.  ? Fly in airplanes.  ? Work in a pressurized cabin or room.  ? Scuba dive.  · Do not use any products that contain nicotine or tobacco, such as cigarettes and e-cigarettes. If you need help quitting, ask your health care provider.  · Keep your ears dry. Wear fitted earplugs during showering and bathing. Dry your ears completely after.  General instructions  · Take over-the-counter and prescription medicines only as told by your health care provider.  · Use techniques to help pop your ears as recommended by your health care provider. These may include:  ? Chewing gum.  ? Yawning.  ? Frequent, forceful swallowing.  ? Closing your mouth, holding your nose closed, and gently blowing as if you are trying to blow air out of your nose.  · Keep all " follow-up visits as told by your health care provider. This is important.  Contact a health care provider if:  · Your symptoms do not go away after treatment.  · Your symptoms come back after treatment.  · You are unable to pop your ears.  · You have:  ? A fever.  ? Pain in your ear.  ? Pain in your head or neck.  ? Fluid draining from your ear.  · Your hearing suddenly changes.  · You become very dizzy.  · You lose your balance.  Summary  · Eustachian tube dysfunction refers to a condition in which a blockage develops in the eustachian tube.  · It can be caused by ear infections, allergies, inhaled irritants, or abnormal growths in the nose or throat.  · Symptoms include ear pain, hearing loss, or ringing in the ears.  · Mild cases are treated with maneuvers to unblock the ears, such as yawning or ear popping.  · Severe cases are treated with medicines. Surgery may also be done (rare).  This information is not intended to replace advice given to you by your health care provider. Make sure you discuss any questions you have with your health care provider.  Document Released: 01/13/2017 Document Revised: 04/09/2019 Document Reviewed: 04/09/2019  [employer] Patient Education © 2020 [employer]

## 2021-09-02 NOTE — ASSESSMENT & PLAN NOTE
She complains of congestion in the right ear for the last week.  No real pain.  Slightly muffled hearing at times.  No pain.  No fever or chills.  No cough.  Patient has been vaccinated.

## 2021-09-02 NOTE — PROGRESS NOTES
"Subjective:     Chief Complaint   Patient presents with   • Ear Fullness     right ear       Gali Broderick is a 76 y.o. female here today for evaluation and management of:    Dysfunction of right eustachian tube  She complains of congestion in the right ear for the last week.  No real pain.  Slightly muffled hearing at times.  No pain.  No fever or chills.  No cough.  Patient has been vaccinated.       Allergies   Allergen Reactions   • Iodine Swelling       Current medicines (including changes today)  Current Outpatient Medications   Medication Sig Dispense Refill   • methylPREDNISolone (MEDROL DOSEPAK) 4 MG Tablet Therapy Pack As directed on the packaging label. 21 Tablet 0   • fluticasone (FLONASE) 50 MCG/ACT nasal spray Administer 2 Sprays into affected nostril(S) every day. 16 g 11   • Multiple Vitamins-Minerals (MULTIVITAL PO) Take  by mouth.     • mupirocin (BACTROBAN) 2 % Ointment Apply to affected area TID prn 15 g 1     No current facility-administered medications for this visit.       She  has a past medical history of Allergy, Chickenpox, Measles, and Mumps.    Patient Active Problem List    Diagnosis Date Noted   • Tingling in extremities 12/03/2020   • Mild aortic insufficiency 11/29/2018   • Chronic pain of both knees 11/29/2018   • Viral wart on left thumb 03/02/2018   • Dysfunction of right eustachian tube 08/29/2016   • Other hyperlipidemia 08/29/2016   • Renal dysfunction 08/29/2016   • Other seasonal allergic rhinitis 05/09/2016       ROS   No fever or chills.  No nausea or vomiting.  No chest pain or palpitations.  No cough or SOB.  No pain with urination or hematuria.  No black or bloody stools.       Objective:     /80 (BP Location: Right arm, Patient Position: Sitting, BP Cuff Size: Adult)   Pulse 88   Temp 37.2 °C (98.9 °F) (Temporal)   Resp 16   Ht 1.6 m (5' 3\")   Wt 77.6 kg (171 lb)   SpO2 97%  Body mass index is 30.29 kg/m².   Physical Exam:  Well developed, well nourished. "  Alert, oriented in no acute distress.  Eye contact is good, speech goal directed, affect calm  Eyes: conjunctiva non-injected, sclera non-icteric.  Ears: Pinna normal. TM pearly gray.  No excess wax   Neck Supple.  No adenopathy or masses in the neck or supraclavicular regions. No thyromegaly  Lungs: clear to auscultation bilaterally with good excursion. No wheezes or rhonchi  CV: regular rate and rhythm. No murmur      Assessment and Plan:   The following treatment plan was discussed    1. Dysfunction of right eustachian tube  Patient will try Flonase first but if it's not effective after one week she will then start oral steroids.  Patient does not want to see an ENT at this time.  - methylPREDNISolone (MEDROL DOSEPAK) 4 MG Tablet Therapy Pack; As directed on the packaging label.  Dispense: 21 Tablet; Refill: 0    Any change or worsening of signs or symptoms, patient encouraged to follow-up or report to the emergency room for further evaluation. Patient understands and agrees.    Followup: Return if symptoms worsen or fail to improve.

## 2021-12-07 ENCOUNTER — HOSPITAL ENCOUNTER (OUTPATIENT)
Dept: LAB | Facility: MEDICAL CENTER | Age: 76
End: 2021-12-07
Attending: FAMILY MEDICINE
Payer: MEDICARE

## 2021-12-07 ENCOUNTER — OFFICE VISIT (OUTPATIENT)
Dept: MEDICAL GROUP | Facility: LAB | Age: 76
End: 2021-12-07
Payer: MEDICARE

## 2021-12-07 VITALS
WEIGHT: 164 LBS | TEMPERATURE: 99.2 F | SYSTOLIC BLOOD PRESSURE: 146 MMHG | BODY MASS INDEX: 29.06 KG/M2 | HEART RATE: 80 BPM | HEIGHT: 63 IN | OXYGEN SATURATION: 94 % | RESPIRATION RATE: 12 BRPM | DIASTOLIC BLOOD PRESSURE: 66 MMHG

## 2021-12-07 DIAGNOSIS — E78.49 OTHER HYPERLIPIDEMIA: ICD-10-CM

## 2021-12-07 DIAGNOSIS — Z00.00 MEDICARE ANNUAL WELLNESS VISIT, SUBSEQUENT: ICD-10-CM

## 2021-12-07 DIAGNOSIS — I35.1 MILD AORTIC INSUFFICIENCY: ICD-10-CM

## 2021-12-07 DIAGNOSIS — R74.8 ELEVATED ALKALINE PHOSPHATASE LEVEL: ICD-10-CM

## 2021-12-07 DIAGNOSIS — J30.2 OTHER SEASONAL ALLERGIC RHINITIS: ICD-10-CM

## 2021-12-07 DIAGNOSIS — Z12.31 ENCOUNTER FOR SCREENING MAMMOGRAM FOR BREAST CANCER: ICD-10-CM

## 2021-12-07 LAB
ALBUMIN SERPL BCP-MCNC: 4.5 G/DL (ref 3.2–4.9)
ALBUMIN/GLOB SERPL: 2.1 G/DL
ALP SERPL-CCNC: 92 U/L (ref 30–99)
ALT SERPL-CCNC: 17 U/L (ref 2–50)
ANION GAP SERPL CALC-SCNC: 9 MMOL/L (ref 7–16)
AST SERPL-CCNC: 22 U/L (ref 12–45)
BILIRUB SERPL-MCNC: 0.4 MG/DL (ref 0.1–1.5)
BUN SERPL-MCNC: 19 MG/DL (ref 8–22)
CALCIUM SERPL-MCNC: 8.8 MG/DL (ref 8.5–10.5)
CHLORIDE SERPL-SCNC: 106 MMOL/L (ref 96–112)
CHOLEST SERPL-MCNC: 202 MG/DL (ref 100–199)
CO2 SERPL-SCNC: 25 MMOL/L (ref 20–33)
CREAT SERPL-MCNC: 0.89 MG/DL (ref 0.5–1.4)
FASTING STATUS PATIENT QL REPORTED: NORMAL
GLOBULIN SER CALC-MCNC: 2.1 G/DL (ref 1.9–3.5)
GLUCOSE SERPL-MCNC: 91 MG/DL (ref 65–99)
HDLC SERPL-MCNC: 62 MG/DL
LDLC SERPL CALC-MCNC: 120 MG/DL
POTASSIUM SERPL-SCNC: 4.2 MMOL/L (ref 3.6–5.5)
PROT SERPL-MCNC: 6.6 G/DL (ref 6–8.2)
SODIUM SERPL-SCNC: 140 MMOL/L (ref 135–145)
TRIGL SERPL-MCNC: 100 MG/DL (ref 0–149)
TSH SERPL DL<=0.005 MIU/L-ACNC: 1.64 UIU/ML (ref 0.38–5.33)

## 2021-12-07 PROCEDURE — G0439 PPPS, SUBSEQ VISIT: HCPCS | Performed by: FAMILY MEDICINE

## 2021-12-07 PROCEDURE — 80053 COMPREHEN METABOLIC PANEL: CPT

## 2021-12-07 PROCEDURE — 84443 ASSAY THYROID STIM HORMONE: CPT

## 2021-12-07 PROCEDURE — 36415 COLL VENOUS BLD VENIPUNCTURE: CPT

## 2021-12-07 PROCEDURE — 80061 LIPID PANEL: CPT

## 2021-12-07 RX ORDER — FLUTICASONE PROPIONATE 50 MCG
2 SPRAY, SUSPENSION (ML) NASAL DAILY
Qty: 16 G | Refills: 11 | Status: SHIPPED | OUTPATIENT
Start: 2021-12-07 | End: 2022-02-09

## 2021-12-07 ASSESSMENT — ENCOUNTER SYMPTOMS: GENERAL WELL-BEING: GOOD

## 2021-12-07 ASSESSMENT — PATIENT HEALTH QUESTIONNAIRE - PHQ9: CLINICAL INTERPRETATION OF PHQ2 SCORE: 0

## 2021-12-07 ASSESSMENT — ACTIVITIES OF DAILY LIVING (ADL): BATHING_REQUIRES_ASSISTANCE: 0

## 2021-12-07 ASSESSMENT — FIBROSIS 4 INDEX: FIB4 SCORE: 2.18

## 2021-12-07 NOTE — PATIENT INSTRUCTIONS
Preventive Care 65 Years and Older, Female  Preventive care refers to lifestyle choices and visits with your health care provider that can promote health and wellness. This includes:  · A yearly physical exam. This is also called an annual well check.  · Regular dental and eye exams.  · Immunizations.  · Screening for certain conditions.  · Healthy lifestyle choices, such as diet and exercise.  What can I expect for my preventive care visit?  Physical exam  Your health care provider will check:  · Height and weight. These may be used to calculate body mass index (BMI), which is a measurement that tells if you are at a healthy weight.  · Heart rate and blood pressure.  · Your skin for abnormal spots.  Counseling  Your health care provider may ask you questions about:  · Alcohol, tobacco, and drug use.  · Emotional well-being.  · Home and relationship well-being.  · Sexual activity.  · Eating habits.  · History of falls.  · Memory and ability to understand (cognition).  · Work and work environment.  · Pregnancy and menstrual history.  What immunizations do I need?    Influenza (flu) vaccine  · This is recommended every year.  Tetanus, diphtheria, and pertussis (Tdap) vaccine  · You may need a Td booster every 10 years.  Varicella (chickenpox) vaccine  · You may need this vaccine if you have not already been vaccinated.  Zoster (shingles) vaccine  · You may need this after age 60.  Pneumococcal conjugate (PCV13) vaccine  · One dose is recommended after age 65.  Pneumococcal polysaccharide (PPSV23) vaccine  · One dose is recommended after age 65.  Measles, mumps, and rubella (MMR) vaccine  · You may need at least one dose of MMR if you were born in 1957 or later. You may also need a second dose.  Meningococcal conjugate (MenACWY) vaccine  · You may need this if you have certain conditions.  Hepatitis A vaccine  · You may need this if you have certain conditions or if you travel or work in places where you may be exposed  to hepatitis A.  Hepatitis B vaccine  · You may need this if you have certain conditions or if you travel or work in places where you may be exposed to hepatitis B.  Haemophilus influenzae type b (Hib) vaccine  · You may need this if you have certain conditions.  You may receive vaccines as individual doses or as more than one vaccine together in one shot (combination vaccines). Talk with your health care provider about the risks and benefits of combination vaccines.  What tests do I need?  Blood tests  · Lipid and cholesterol levels. These may be checked every 5 years, or more frequently depending on your overall health.  · Hepatitis C test.  · Hepatitis B test.  Screening  · Lung cancer screening. You may have this screening every year starting at age 55 if you have a 30-pack-year history of smoking and currently smoke or have quit within the past 15 years.  · Colorectal cancer screening. All adults should have this screening starting at age 50 and continuing until age 75. Your health care provider may recommend screening at age 45 if you are at increased risk. You will have tests every 1-10 years, depending on your results and the type of screening test.  · Diabetes screening. This is done by checking your blood sugar (glucose) after you have not eaten for a while (fasting). You may have this done every 1-3 years.  · Mammogram. This may be done every 1-2 years. Talk with your health care provider about how often you should have regular mammograms.  · BRCA-related cancer screening. This may be done if you have a family history of breast, ovarian, tubal, or peritoneal cancers.  Other tests  · Sexually transmitted disease (STD) testing.  · Bone density scan. This is done to screen for osteoporosis. You may have this done starting at age 65.  Follow these instructions at home:  Eating and drinking  · Eat a diet that includes fresh fruits and vegetables, whole grains, lean protein, and low-fat dairy products. Limit  your intake of foods with high amounts of sugar, saturated fats, and salt.  · Take vitamin and mineral supplements as recommended by your health care provider.  · Do not drink alcohol if your health care provider tells you not to drink.  · If you drink alcohol:  ? Limit how much you have to 0-1 drink a day.  ? Be aware of how much alcohol is in your drink. In the [place] one drink equals one 12 oz bottle of beer (355 mL), one 5 oz glass of wine (148 mL), or one 1½ oz glass of hard liquor (44 mL).  Lifestyle  · Take daily care of your teeth and gums.  · Stay active. Exercise for at least 30 minutes on 5 or more days each week.  · Do not use any products that contain nicotine or tobacco, such as cigarettes, e-cigarettes, and chewing tobacco. If you need help quitting, ask your health care provider.  · If you are sexually active, practice safe sex. Use a condom or other form of protection in order to prevent STIs (sexually transmitted infections).  · Talk with your health care provider about taking a low-dose aspirin or statin.  What's next?  · Go to your health care provider once a year for a well check visit.  · Ask your health care provider how often you should have your eyes and teeth checked.  · Stay up to date on all vaccines.  This information is not intended to replace advice given to you by your health care provider. Make sure you discuss any questions you have with your health care provider.  Document Released: 01/13/2017 Document Revised: 12/12/2019 Document Reviewed: 12/12/2019  [employer] Patient Education © 2020 [employer]

## 2021-12-07 NOTE — PROGRESS NOTES
Chief Complaint   Patient presents with   • Annual Exam     Medicare AWV         HPI:  Gali is a 76 y.o. here for Medicare Annual Wellness Visit      Patient Active Problem List    Diagnosis Date Noted   • Tingling in extremities 12/03/2020   • Mild aortic insufficiency 11/29/2018   • Chronic pain of both knees 11/29/2018   • Viral wart on left thumb 03/02/2018   • Dysfunction of right eustachian tube 08/29/2016   • Other hyperlipidemia 08/29/2016   • Renal dysfunction 08/29/2016   • Other seasonal allergic rhinitis 05/09/2016       Current Outpatient Medications   Medication Sig Dispense Refill   • fluticasone (FLONASE) 50 MCG/ACT nasal spray Administer 2 Sprays into affected nostril(S) every day. 16 g 11   • Multiple Vitamins-Minerals (MULTIVITAL PO) Take  by mouth.     • mupirocin (BACTROBAN) 2 % Ointment Apply to affected area TID prn 15 g 1     No current facility-administered medications for this visit.        Patient is taking medications as noted in medication list.  Current supplements as per medication list.     Allergies: Iodine    Current social contact/activities: Gatherings with friends every Sunday, Bahai     Is patient current with immunizations? No, due for FLU, PNEUMOVAX (PPSV23), TDAP and SHINGRIX (Shingles). Patient is interested in receiving NONE today.    She  reports that she has never smoked. She has never used smokeless tobacco. She reports current alcohol use of about 0.6 oz of alcohol per week. She reports that she does not use drugs.  Counseling given: Not Answered        DPA/Advanced directive: Patient has Advanced Directive on file.     ROS:    Gait: Uses no assistive device   Ostomy: No   Other tubes: No   Amputations: No   Chronic oxygen use No   Last eye exam 08/2020   Wears hearing aids: No   : Denies any urinary leakage during the last 6 months      Screening:        Depression Screening    Little interest or pleasure in doing things?  0 - not at all  Feeling down, depressed,  or hopeless? 0 - not at all  Patient Health Questionnaire Score: 0    If depressive symptoms identified deferred to follow up visit unless specifically addressed in assessment and plan.    Interpretation of PHQ-9 Total Score   Score Severity   1-4 No Depression   5-9 Mild Depression   10-14 Moderate Depression   15-19 Moderately Severe Depression   20-27 Severe Depression    Screening for Cognitive Impairment    Three Minute Recall (captain, garden, picture)  3/3    Barron clock face with all 12 numbers and set the hands to show 5 past 8.  Yes    If cognitive concerns identified, deferred for follow up unless specifically addressed in assessment and plan.    Fall Risk Assessment    Has the patient had two or more falls in the last year or any fall with injury in the last year?  No  If fall risk identified, deferred for follow up unless specifically addressed in assessment and plan.    Safety Assessment    Throw rugs on floor.  Yes  Handrails on all stairs.  Yes  Good lighting in all hallways.  Yes  Difficulty hearing.  No  Patient counseled about all safety risks that were identified.    Functional Assessment ADLs    Are there any barriers preventing you from cooking for yourself or meeting nutritional needs?  No.    Are there any barriers preventing you from driving safely or obtaining transportation?  No.    Are there any barriers preventing you from using a telephone or calling for help?  No.    Are there any barriers preventing you from shopping?  No.    Are there any barriers preventing you from taking care of your own finances?  No.    Are there any barriers preventing you from managing your medications?  No.    Are there any barriers preventing you from showering, bathing or dressing yourself?  No.    Are you currently engaging in any exercise or physical activity?  Yes.  Walking, hiking   What is your perception of your health?  Good.    Health Maintenance Summary          Overdue - COVID-19 Vaccine (1)  Overdue - never done    No completion history exists for this topic.          Overdue - IMM DTaP/Tdap/Td Vaccine (1 - Tdap) Overdue - never done    No completion history exists for this topic.          Overdue - IMM ZOSTER VACCINES (1 of 2) Overdue - never done    No completion history exists for this topic.          Overdue - IMM PNEUMOCOCCAL VACCINE: 65+ Years (1 of 1 - PPSV23) Overdue - never done    No completion history exists for this topic.          Overdue - BONE DENSITY (Every 5 Years) Overdue since 9/22/2010 09/22/2005  DS-BONE DENSITY STUDY (DEXA)          Ordered - MAMMOGRAM (Yearly) Ordered on 12/7/2021 05/29/2019  [id number]    11/09/2017  [id number]    08/03/2016  MA-SCREEN MAMMO W/CAD-BILAT    09/22/2005  [id number]          Overdue - IMM INFLUENZA (1) Overdue - never done    No completion history exists for this topic.          Annual Wellness Visit (Every 366 Days) Next due on 12/8/2022 12/07/2021  Visit Dx: Medicare annual wellness visit, subsequent    12/07/2021  Level of Service: ANNUAL WELLNESS VISIT-INCLUDES PPPS SUBSEQUE*    12/03/2020  Visit Dx: Medicare annual wellness visit, subsequent    12/02/2019  Visit Dx: Medicare annual wellness visit, subsequent    12/02/2019  Subsequent Annual Wellness Visit - Includes PPPS ()    Only the first 5 history entries have been loaded, but more history exists.          IMM HEP B VACCINE (Series Information) Aged Out    No completion history exists for this topic.          IMM MENINGOCOCCAL VACCINE (MCV4) (Series Information) Aged Out    No completion history exists for this topic.          Discontinued - PAP SMEAR  Discontinued    No completion history exists for this topic.          Discontinued - COLORECTAL CANCER SCREENING  Discontinued    No completion history exists for this topic.                Patient Care Team:  Zara Gonzalez M.D. as PCP - General (Family Medicine)  Piotr Jacobson M.D. as  "Consulting Physician (Dermatology)  Christian Chamberlain M.D. as Consulting Physician (Ophthalmology)  Ty Henley M.D. (Cardiovascular Disease (Cardiology))    Social History     Tobacco Use   • Smoking status: Never Smoker   • Smokeless tobacco: Never Used   Substance Use Topics   • Alcohol use: Yes     Alcohol/week: 0.6 oz     Types: 1 Glasses of wine per week   • Drug use: No     Family History   Problem Relation Age of Onset   • No Known Problems Mother    • Cancer Father      She  has a past medical history of Allergy, Chickenpox, Measles, and Mumps.   Past Surgical History:   Procedure Laterality Date   • ABDOMINAL HYSTERECTOMY TOTAL     • EYE SURGERY      cataract   • HERNIA REPAIR     • TONSILLECTOMY AND ADENOIDECTOMY             Exam:     /66 (BP Location: Right arm, Patient Position: Sitting, BP Cuff Size: Adult)   Pulse 80   Temp 37.3 °C (99.2 °F)   Resp 12   Ht 1.595 m (5' 2.8\")   Wt 74.4 kg (164 lb)   SpO2 94%  Body mass index is 29.24 kg/m².    Hearing good.    Alert, oriented in no acute distress.  Eye contact is good, speech goal directed, affect calm  Constitutional: Alert, no distress.  Skin: Warm, dry, good turgor, no rashes in visible areas.  Eye: Equal, round and reactive, conjunctiva clear, lids normal.  ENMT: Pinna are normal.  TMs clear bilaterally  Neck: Trachea midline, no masses, no thyromegaly. No cervical or supraclavicular lymphadenopathy  Respiratory: Unlabored respiratory effort, lungs clear to auscultation, no wheezes, no ronchi.  Cardiovascular: Normal S1, S2, RRR, no murmur, no edema.  Abdomen: Soft, non-tender, no masses, no hepatosplenomegaly.  Psych: Alert and oriented x3, normal affect and mood.        Assessment and Plan. The following treatment and monitoring plan is recommended:    1. Medicare annual wellness visit, subsequent  Routine anticipatory guidance.  No special services needed at this time.  Health counseling done    2. Other seasonal allergic " rhinitis  Trial of Flonase 2 sprays each nostril daily.  Call if not improving.  Continue Allegra or Claritin daily  - fluticasone (FLONASE) 50 MCG/ACT nasal spray; Administer 2 Sprays into affected nostril(S) every day.  Dispense: 16 g; Refill: 11    3. Other hyperlipidemia  Check labs.  Await results.  Mediterranean eating plan recommended  - Lipid Profile; Future  - TSH; Future    4. Mild aortic insufficiency  Continue follow-up with cardiology    5. Elevated alkaline phosphatase level  Recheck labs.  Await results  - Comp Metabolic Panel; Future    6. Encounter for screening mammogram for breast cancer    - MA-SCREENING MAMMO BILAT W/CAD; Future      Services suggested: No services needed at this time  Health Care Screening recommendations as per orders if indicated.  Referrals offered: PT/OT/Nutrition counseling/Behavioral Health/Smoking cessation as per orders if indicated.    Discussion today about general wellness and lifestyle habits:    · Prevent falls and reduce trip hazards; Cautioned about securing or removing rugs.  · Have a working fire alarm and carbon monoxide detector;   · Engage in regular physical activity and social activities       Follow-up: Return in about 1 year (around 12/7/2022).

## 2022-02-09 DIAGNOSIS — J30.2 OTHER SEASONAL ALLERGIC RHINITIS: ICD-10-CM

## 2022-02-09 RX ORDER — FLUTICASONE PROPIONATE 50 MCG
2 SPRAY, SUSPENSION (ML) NASAL DAILY
Qty: 48 ML | Refills: 3 | Status: SHIPPED | OUTPATIENT
Start: 2022-02-09 | End: 2023-03-29

## 2022-02-09 NOTE — TELEPHONE ENCOUNTER
Received request via: Pharmacy    Was the patient seen in the last year in this department? Yes  12/7/21  Does the patient have an active prescription (recently filled or refills available) for medication(s) requested? No

## 2022-11-09 ENCOUNTER — PATIENT MESSAGE (OUTPATIENT)
Dept: HEALTH INFORMATION MANAGEMENT | Facility: OTHER | Age: 77
End: 2022-11-09

## 2022-12-01 ENCOUNTER — TELEPHONE (OUTPATIENT)
Dept: MEDICAL GROUP | Facility: LAB | Age: 77
End: 2022-12-01
Payer: MEDICARE

## 2022-12-01 RX ORDER — TELMISARTAN 20 MG/1
TABLET ORAL
COMMUNITY
Start: 2022-10-26

## 2022-12-01 NOTE — TELEPHONE ENCOUNTER
Left message for patient to call back regarding pre-visit planning. Please transfer call to [phone].

## 2022-12-01 NOTE — TELEPHONE ENCOUNTER
ANNUAL WELLNESS VISIT PRE-VISIT PLANNING    1.  Reviewed notes from the last office visit: Yes    2.  If any orders were ordered or intended to be done prior to visit (i.e. 6 mos follow-up), do we have results/consult notes or has patient scheduled?          Labs - Labs ordered, completed on 12/7/2021 and results are in chart.  Note: If patient appointment is for lab review and patient did not complete labs, check with provider if OK to reschedule patient until labs completed.         Imaging - Imaging ordered, NOT completed. Patient advised to complete prior to next appointment.         Referrals - No referrals were ordered at last office visit.    3.  Immunizations were updated in Epic using Reconcile Outside Information activity? No  Error occurred   4.  Patient is due for the following Health Maintenance Topics:   Health Maintenance Due   Topic Date Due    IMM HEP B VACCINE (1 of 3 - 3-dose series) Never done    HEPATITIS C SCREENING  Never done    COVID-19 Vaccine (1) Never done    IMM DTaP/Tdap/Td Vaccine (1 - Tdap) Never done    IMM ZOSTER VACCINES (1 of 2) Never done    IMM PNEUMOCOCCAL VACCINE: 65+ Years (1 - PCV) Never done    BONE DENSITY  09/22/2010    IMM INFLUENZA (1) Never done   5.  Reviewed/Updated the following with patient:          Preferred Pharmacy? Yes           Preferred Lab? Yes           Preferred Communication? Yes           Allergies? Yes          Medications? Yes, abstract   6.  Care Team Updated:          DME Company (gait device, O2, CPAP, etc.): N/A           Other Specialists (eye doctor, derm, GYN, cardiology, endo, etc): Yes     7.  Patient was advised: “This is a free wellness visit. The provider will screen for medical conditions to help you stay healthy. If you have other concerns to address you may be asked to discuss these at a separate visit or there may be an additional fee.”     8.  AHA (Puls8) form printed for Provider? N/A

## 2022-12-08 ENCOUNTER — OFFICE VISIT (OUTPATIENT)
Dept: MEDICAL GROUP | Facility: LAB | Age: 77
End: 2022-12-08
Payer: MEDICARE

## 2022-12-08 ENCOUNTER — HOSPITAL ENCOUNTER (OUTPATIENT)
Dept: LAB | Facility: MEDICAL CENTER | Age: 77
End: 2022-12-08
Attending: NURSE PRACTITIONER
Payer: MEDICARE

## 2022-12-08 VITALS
TEMPERATURE: 96.5 F | WEIGHT: 166 LBS | SYSTOLIC BLOOD PRESSURE: 138 MMHG | RESPIRATION RATE: 16 BRPM | BODY MASS INDEX: 29.41 KG/M2 | OXYGEN SATURATION: 97 % | HEIGHT: 63 IN | DIASTOLIC BLOOD PRESSURE: 70 MMHG | HEART RATE: 90 BPM

## 2022-12-08 DIAGNOSIS — N28.9 RENAL DYSFUNCTION: ICD-10-CM

## 2022-12-08 DIAGNOSIS — M25.562 CHRONIC PAIN OF BOTH KNEES: ICD-10-CM

## 2022-12-08 DIAGNOSIS — I77.810 AORTIC ROOT DILATATION (HCC): ICD-10-CM

## 2022-12-08 DIAGNOSIS — Z12.31 ENCOUNTER FOR SCREENING MAMMOGRAM FOR BREAST CANCER: ICD-10-CM

## 2022-12-08 DIAGNOSIS — I10 ESSENTIAL (PRIMARY) HYPERTENSION: ICD-10-CM

## 2022-12-08 DIAGNOSIS — I35.1 MILD AORTIC INSUFFICIENCY: ICD-10-CM

## 2022-12-08 DIAGNOSIS — G89.29 CHRONIC PAIN OF BOTH KNEES: ICD-10-CM

## 2022-12-08 DIAGNOSIS — E78.49 OTHER HYPERLIPIDEMIA: ICD-10-CM

## 2022-12-08 DIAGNOSIS — Z00.00 MEDICARE ANNUAL WELLNESS VISIT, SUBSEQUENT: ICD-10-CM

## 2022-12-08 DIAGNOSIS — M25.561 CHRONIC PAIN OF BOTH KNEES: ICD-10-CM

## 2022-12-08 DIAGNOSIS — J30.2 OTHER SEASONAL ALLERGIC RHINITIS: ICD-10-CM

## 2022-12-08 PROBLEM — R20.2 TINGLING IN EXTREMITIES: Status: RESOLVED | Noted: 2020-12-03 | Resolved: 2022-12-08

## 2022-12-08 PROBLEM — B07.9 VIRAL WART ON LEFT THUMB: Status: RESOLVED | Noted: 2018-03-02 | Resolved: 2022-12-08

## 2022-12-08 LAB
ALBUMIN SERPL BCP-MCNC: 4.2 G/DL (ref 3.2–4.9)
ALBUMIN/GLOB SERPL: 1.4 G/DL
ALP SERPL-CCNC: 98 U/L (ref 30–99)
ALT SERPL-CCNC: 14 U/L (ref 2–50)
ANION GAP SERPL CALC-SCNC: 9 MMOL/L (ref 7–16)
AST SERPL-CCNC: 20 U/L (ref 12–45)
BASOPHILS # BLD AUTO: 0.8 % (ref 0–1.8)
BASOPHILS # BLD: 0.04 K/UL (ref 0–0.12)
BILIRUB SERPL-MCNC: 0.4 MG/DL (ref 0.1–1.5)
BUN SERPL-MCNC: 17 MG/DL (ref 8–22)
CALCIUM SERPL-MCNC: 9.5 MG/DL (ref 8.5–10.5)
CHLORIDE SERPL-SCNC: 106 MMOL/L (ref 96–112)
CHOLEST SERPL-MCNC: 212 MG/DL (ref 100–199)
CO2 SERPL-SCNC: 25 MMOL/L (ref 20–33)
CREAT SERPL-MCNC: 0.87 MG/DL (ref 0.5–1.4)
EOSINOPHIL # BLD AUTO: 0.17 K/UL (ref 0–0.51)
EOSINOPHIL NFR BLD: 3.6 % (ref 0–6.9)
ERYTHROCYTE [DISTWIDTH] IN BLOOD BY AUTOMATED COUNT: 46.3 FL (ref 35.9–50)
GFR SERPLBLD CREATININE-BSD FMLA CKD-EPI: 68 ML/MIN/1.73 M 2
GLOBULIN SER CALC-MCNC: 3 G/DL (ref 1.9–3.5)
GLUCOSE SERPL-MCNC: 86 MG/DL (ref 65–99)
HCT VFR BLD AUTO: 42.4 % (ref 37–47)
HDLC SERPL-MCNC: 56 MG/DL
HGB BLD-MCNC: 13.5 G/DL (ref 12–16)
IMM GRANULOCYTES # BLD AUTO: 0.01 K/UL (ref 0–0.11)
IMM GRANULOCYTES NFR BLD AUTO: 0.2 % (ref 0–0.9)
LDLC SERPL CALC-MCNC: 131 MG/DL
LYMPHOCYTES # BLD AUTO: 1.05 K/UL (ref 1–4.8)
LYMPHOCYTES NFR BLD: 22.1 % (ref 22–41)
MCH RBC QN AUTO: 30.3 PG (ref 27–33)
MCHC RBC AUTO-ENTMCNC: 31.8 G/DL (ref 33.6–35)
MCV RBC AUTO: 95.1 FL (ref 81.4–97.8)
MONOCYTES # BLD AUTO: 0.31 K/UL (ref 0–0.85)
MONOCYTES NFR BLD AUTO: 6.5 % (ref 0–13.4)
NEUTROPHILS # BLD AUTO: 3.17 K/UL (ref 2–7.15)
NEUTROPHILS NFR BLD: 66.8 % (ref 44–72)
NRBC # BLD AUTO: 0 K/UL
NRBC BLD-RTO: 0 /100 WBC
PLATELET # BLD AUTO: 193 K/UL (ref 164–446)
PMV BLD AUTO: 11.4 FL (ref 9–12.9)
POTASSIUM SERPL-SCNC: 4.1 MMOL/L (ref 3.6–5.5)
PROT SERPL-MCNC: 7.2 G/DL (ref 6–8.2)
RBC # BLD AUTO: 4.46 M/UL (ref 4.2–5.4)
SODIUM SERPL-SCNC: 140 MMOL/L (ref 135–145)
TRIGL SERPL-MCNC: 123 MG/DL (ref 0–149)
WBC # BLD AUTO: 4.8 K/UL (ref 4.8–10.8)

## 2022-12-08 PROCEDURE — 80061 LIPID PANEL: CPT

## 2022-12-08 PROCEDURE — 36415 COLL VENOUS BLD VENIPUNCTURE: CPT

## 2022-12-08 PROCEDURE — G0439 PPPS, SUBSEQ VISIT: HCPCS | Performed by: NURSE PRACTITIONER

## 2022-12-08 PROCEDURE — 85025 COMPLETE CBC W/AUTO DIFF WBC: CPT

## 2022-12-08 PROCEDURE — 80053 COMPREHEN METABOLIC PANEL: CPT

## 2022-12-08 ASSESSMENT — PATIENT HEALTH QUESTIONNAIRE - PHQ9: CLINICAL INTERPRETATION OF PHQ2 SCORE: 0

## 2022-12-08 ASSESSMENT — ACTIVITIES OF DAILY LIVING (ADL): BATHING_REQUIRES_ASSISTANCE: 0

## 2022-12-08 ASSESSMENT — ENCOUNTER SYMPTOMS: GENERAL WELL-BEING: GOOD

## 2022-12-08 ASSESSMENT — FIBROSIS 4 INDEX: FIB4 SCORE: 2.45

## 2022-12-08 NOTE — PROGRESS NOTES
Chief Complaint   Patient presents with    Medicare Annual Wellness       HPI:  Gali is a 77 y.o. here for Medicare Annual Wellness Visit.  Also followed by Dr. Dia - cardiology.  Dr. Jacobson - dermatology.  Dr. Lopez - opthalmology.     Patient Active Problem List    Diagnosis Date Noted    Tingling in extremities 12/03/2020    Mild aortic insufficiency 11/29/2018    Chronic pain of both knees 11/29/2018    Viral wart on left thumb 03/02/2018    Dysfunction of right eustachian tube 08/29/2016    Other hyperlipidemia 08/29/2016    Renal dysfunction 08/29/2016    Other seasonal allergic rhinitis 05/09/2016       Current Outpatient Medications   Medication Sig Dispense Refill    Multiple Vitamins-Minerals (MULTIVITAMIN ADULTS PO) Take  by mouth.      telmisartan (MICARDIS) 20 MG tablet 1.5 tablets/day      fluticasone (FLONASE) 50 MCG/ACT nasal spray ADMINISTER 2 SPRAYS INTO AFFECTED NOSTRIL(S) EVERY DAY. 48 mL 3    mupirocin (BACTROBAN) 2 % Ointment Apply to affected area TID prn (Patient not taking: Reported on 12/1/2022) 15 g 1     No current facility-administered medications for this visit.        Patient is taking medications as noted in medication list.  Current supplements as per medication list.     Allergies: Iodine    Current social contact/activities: Restoration/quilting/guild/bunco/book club/hike/cards/    Is patient current with immunizations? No, due for no. Patient is interested in receiving NONE today.    She  reports that she has never smoked. She has never used smokeless tobacco. She reports current alcohol use of about 0.6 oz per week. She reports that she does not use drugs.  Counseling given: Not Answered      ROS:    Gait: Uses no assistive device   Ostomy: No   Other tubes: No   Amputations: No   Chronic oxygen use No   Last eye exam 10/21   Wears hearing aids: No   : Denies any urinary leakage during the last 6 months    Screening:    Depression Screening  Little interest or pleasure in  doing things?  0 - not at all  Feeling down, depressed, or hopeless? 0 - not at all  Patient Health Questionnaire Score: 0    If depressive symptoms identified deferred to follow up visit unless specifically addressed in assessment and plan.    Interpretation of PHQ-9 Total Score   Score Severity   1-4 No Depression   5-9 Mild Depression   10-14 Moderate Depression   15-19 Moderately Severe Depression   20-27 Severe Depression    Screening for Cognitive Impairment  Three Minute Recall (daughter, heaven, mountain)  3/3    Barron clock face with all 12 numbers and set the hands to show 10 past 11.  Yes    If cognitive concerns identified, deferred for follow up unless specifically addressed in assessment and plan.    Fall Risk Assessment  Has the patient had two or more falls in the last year or any fall with injury in the last year?  No  If fall risk identified, deferred for follow up unless specifically addressed in assessment and plan.    Safety Assessment  Throw rugs on floor.  No  Handrails on all stairs.  Yes  Good lighting in all hallways.  Yes  Difficulty hearing.  No  Patient counseled about all safety risks that were identified.    Functional Assessment ADLs  Are there any barriers preventing you from cooking for yourself or meeting nutritional needs?  No.    Are there any barriers preventing you from driving safely or obtaining transportation?  No.    Are there any barriers preventing you from using a telephone or calling for help?  No.    Are there any barriers preventing you from shopping?  No.    Are there any barriers preventing you from taking care of your own finances?  No.    Are there any barriers preventing you from managing your medications?  No.    Are there any barriers preventing you from showering, bathing or dressing yourself?  No.    Are you currently engaging in any exercise or physical activity?  No.     What is your perception of your health?  Good.    Advance Care Planning  Do you have an  Advance Directive, Living Will, Durable Power of , or POLST? Yes      Durable Power of    is on file    Health Maintenance Summary            Overdue - IMM HEP B VACCINE (1 of 3 - 3-dose series) Overdue - never done      No completion history exists for this topic.              Overdue - HEPATITIS C SCREENING (Once) Overdue - never done      No completion history exists for this topic.              Overdue - COVID-19 Vaccine (1) Overdue - never done      No completion history exists for this topic.              Overdue - IMM DTaP/Tdap/Td Vaccine (1 - Tdap) Overdue - never done      No completion history exists for this topic.              Overdue - IMM ZOSTER VACCINES (1 of 2) Overdue - never done      No completion history exists for this topic.              Overdue - IMM PNEUMOCOCCAL VACCINE: 65+ Years (1 - PCV) Overdue - never done      No completion history exists for this topic.              Overdue - BONE DENSITY (Every 5 Years) Overdue since 9/22/2010 09/22/2005  DS-BONE DENSITY STUDY (DEXA)              Overdue - IMM INFLUENZA (1) Overdue - never done      No completion history exists for this topic.              IMM MENINGOCOCCAL ACWY VACCINE (Series Information) Aged Out      No completion history exists for this topic.              Discontinued - MAMMOGRAM  Discontinued        Frequency changed to Never automatically (Topic No Longer Applies)    05/29/2019  [id number]    11/09/2017  [id number]    08/03/2016  MA-SCREEN MAMMO W/CAD-BILAT    09/22/2005  [id number]                    Patient Care Team:  Zara Gonzalez M.D. as PCP - General (Family Medicine)  Piotr Jacobson M.D. as Consulting Physician (Dermatology)  Christian Chamberlain M.D. as Consulting Physician (Ophthalmology)  Michael Franco D.O. (Cardiovascular Disease (Cardiology))    Social History     Tobacco Use    Smoking status: Never    Smokeless tobacco: Never   Substance Use Topics     "Alcohol use: Yes     Alcohol/week: 0.6 oz     Types: 1 Glasses of wine per week    Drug use: No     Family History   Problem Relation Age of Onset    No Known Problems Mother     Cancer Father      She  has a past medical history of Allergy, Chickenpox, Measles, and Mumps.   Past Surgical History:   Procedure Laterality Date    ABDOMINAL HYSTERECTOMY TOTAL      EYE SURGERY      cataract    HERNIA REPAIR      TONSILLECTOMY AND ADENOIDECTOMY         Exam:   /70 (BP Location: Right arm, Patient Position: Sitting, BP Cuff Size: Large adult)   Pulse 90   Temp 35.8 °C (96.5 °F)   Resp 16   Ht 1.594 m (5' 2.75\")   Wt 75.3 kg (166 lb)   SpO2 97%  Body mass index is 29.64 kg/m².    Hearing - excellent.    Dentition good  Alert, oriented in no acute distress.  Eye contact is good, speech goal directed, affect calm  CV RRR.  Murmur heard best right second intercostal space.    Assessment and Plan. The following treatment and monitoring plan is recommended:     \"  1. Medicare annual wellness visit, subsequent        2. Mild aortic insufficiency - Dr. Dia  Comp Metabolic Panel    CBC WITH DIFFERENTIAL    Lipid Profile      3. Aortic root dilatation (HCC)  Comp Metabolic Panel    CBC WITH DIFFERENTIAL    Lipid Profile      4. Essential (primary) hypertension  Comp Metabolic Panel    CBC WITH DIFFERENTIAL    Lipid Profile      5. Other hyperlipidemia  Comp Metabolic Panel    CBC WITH DIFFERENTIAL    Lipid Profile      6. Renal dysfunction  Comp Metabolic Panel    CBC WITH DIFFERENTIAL    Lipid Profile      7. Encounter for screening mammogram for breast cancer  MA-SCREENING MAMMO BILAT W/TOMOSYNTHESIS W/CAD      8. Chronic pain of both knees        9. Other seasonal allergic rhinitis        \"  Recommend a full updated lab panel.  Prefers to do mammograms at [facility] and was given an order.  Prefers to refrain from any further bone densities.  Refuses all vaccines.  She is staying physically active.  " Sleeping well at night.  Up-to-date with all of her specialist.  Overall feeling well.  Follow-up Dr. Gonzalez 1 year.    Services suggested: No services needed at this time  Health Care Screening recommendations as per orders if indicated.  Referrals offered: PT/OT/Nutrition counseling/Behavioral Health/Smoking cessation as per orders if indicated.    Discussion today about general wellness and lifestyle habits:    Prevent falls and reduce trip hazards; Cautioned about securing or removing rugs.  Have a working fire alarm and carbon monoxide detector;   Engage in regular physical activity and social activities

## 2023-01-17 ENCOUNTER — HOSPITAL ENCOUNTER (OUTPATIENT)
Dept: RADIOLOGY | Facility: MEDICAL CENTER | Age: 78
End: 2023-01-17
Payer: COMMERCIAL

## 2023-01-19 ENCOUNTER — APPOINTMENT (OUTPATIENT)
Dept: RADIOLOGY | Facility: MEDICAL CENTER | Age: 78
End: 2023-01-19
Attending: NURSE PRACTITIONER
Payer: COMMERCIAL

## 2023-01-19 DIAGNOSIS — Z12.31 ENCOUNTER FOR SCREENING MAMMOGRAM FOR BREAST CANCER: ICD-10-CM

## 2023-01-19 PROCEDURE — 77067 SCR MAMMO BI INCL CAD: CPT

## 2023-01-23 ENCOUNTER — OFFICE VISIT (OUTPATIENT)
Dept: MEDICAL GROUP | Facility: LAB | Age: 78
End: 2023-01-23
Payer: COMMERCIAL

## 2023-01-23 VITALS
RESPIRATION RATE: 16 BRPM | BODY MASS INDEX: 28.7 KG/M2 | SYSTOLIC BLOOD PRESSURE: 118 MMHG | DIASTOLIC BLOOD PRESSURE: 60 MMHG | TEMPERATURE: 97.2 F | HEIGHT: 63 IN | OXYGEN SATURATION: 97 % | WEIGHT: 162 LBS

## 2023-01-23 DIAGNOSIS — I35.1 MILD AORTIC INSUFFICIENCY: ICD-10-CM

## 2023-01-23 DIAGNOSIS — I10 ESSENTIAL (PRIMARY) HYPERTENSION: ICD-10-CM

## 2023-01-23 DIAGNOSIS — I77.810 AORTIC ROOT DILATATION (HCC): ICD-10-CM

## 2023-01-23 DIAGNOSIS — H93.8X1 SENSATION OF FULLNESS IN RIGHT EAR: ICD-10-CM

## 2023-01-23 PROCEDURE — 99214 OFFICE O/P EST MOD 30 MIN: CPT | Performed by: NURSE PRACTITIONER

## 2023-01-23 ASSESSMENT — PATIENT HEALTH QUESTIONNAIRE - PHQ9: CLINICAL INTERPRETATION OF PHQ2 SCORE: 0

## 2023-01-23 ASSESSMENT — FIBROSIS 4 INDEX: FIB4 SCORE: 2.19

## 2023-01-23 NOTE — PROGRESS NOTES
"Chief Complaint   Patient presents with    Ear Fullness     Right ear ticking    Orders Needed     Updated Echo        HPI:  Gali is a 77-year-old established female Dr. Gonzalez's here with request for an updated echocardiogram.  Last echocardiogram was done at [place] May 2022 which showed aortic root dilation @ 3.9 and mild aortic insufficiency.  She was previously followed by Dr. Franco although he is leaving [place] and she would like to move cardiology care over to [facility]/is requesting a referral today.  She denies chest pain or trouble breathing.  Looking forward to going to [place] in July.  She does have hypertension and is responding well to telmisartan 20 mg daily.    Right ear fullness: Hearing a clicking noise.  Had ear irrigated about 20 years ago which was helpful with similar symptoms.  Denies right ear pain.  Had a cold about a month ago but this resolved.      Exam:  /60 (BP Location: Left arm, Patient Position: Sitting, BP Cuff Size: Large adult)   Temp 36.2 °C (97.2 °F)   Resp 16   Ht 1.594 m (5' 2.75\")   Wt 73.5 kg (162 lb)   SpO2 97%    Gen. appears healthy in no distress   Skin appropriate for sex and age   Neck trachea is midline  ENT: Right ear canal obstructed with soft, yellow cerumen which I attempted to scope/scoop but this was uncomfortable for her and therefore medical assistant irrigated right ear.  Left tympanic membrane translucent and there is no wax in her left ear canal.  Lungs unlabored breathing  Heart regular rate and rhythm with a very slight murmur heard 2/6.  Neuro gait and station normal   Psych appropriate, calm, interactive, well-groomed      A/P:    1. Aortic root dilatation (HCC)  REFERRAL TO CARDIOLOGY    EC-ECHOCARDIOGRAM COMPLETE W/O CONT      2. Mild aortic insufficiency - Dr. Dia  REFERRAL TO CARDIOLOGY    EC-ECHOCARDIOGRAM COMPLETE W/O CONT      3. Sensation of fullness in right ear        4. Essential (primary) hypertension      "     Referred to establish with [facility].  Ordered updated echocardiogram.  Blood pressure well controlled, continue 20 mg telmisartan.  Fortunately asymptomatic in terms of issues with shortness of breath, chest pain or heart palpitations.  Denies lower extremity swelling.  Encouraged her to continue with a healthy diet and exercise.    Medical assistant irrigated right ear.  Right tympanic membrane translucent after all wax removed.  Patient tolerated this very well.  She will follow-up regarding this as needed.    HCC Gap Form    Diagnosis to address: I77.810 - Aortic root dilatation (HCC)  Assessment and plan: Chronic, stable. Continue with current defined treatment plan: recommend updated echo.  Referred to cardiology.  Follow-up at least annually.  Last edited 01/23/23 09:16 PST by JOHNNY Fowler

## 2023-03-29 DIAGNOSIS — J30.2 OTHER SEASONAL ALLERGIC RHINITIS: ICD-10-CM

## 2023-03-29 RX ORDER — FLUTICASONE PROPIONATE 50 MCG
2 SPRAY, SUSPENSION (ML) NASAL DAILY
Qty: 48 ML | Refills: 3 | Status: SHIPPED | OUTPATIENT
Start: 2023-03-29

## 2023-03-29 NOTE — TELEPHONE ENCOUNTER
Received request via: Pharmacy    Was the patient seen in the last year in this department? Yes  LOV 01/23/2023  Does the patient have an active prescription (recently filled or refills available) for medication(s) requested? No    Does the patient have MCFP Plus and need 100 day supply (blood pressure, diabetes and cholesterol meds only)? Patient does not have SCP

## 2023-04-11 ENCOUNTER — HOSPITAL ENCOUNTER (OUTPATIENT)
Dept: CARDIOLOGY | Facility: MEDICAL CENTER | Age: 78
End: 2023-04-11
Attending: NURSE PRACTITIONER
Payer: COMMERCIAL

## 2023-04-11 DIAGNOSIS — I77.810 AORTIC ROOT DILATATION (HCC): ICD-10-CM

## 2023-04-11 DIAGNOSIS — I35.1 MILD AORTIC INSUFFICIENCY: ICD-10-CM

## 2023-04-11 LAB
LV EJECT FRACT  99904: 60
LV EJECT FRACT MOD 2C 99903: 70.16
LV EJECT FRACT MOD 4C 99902: 77.11
LV EJECT FRACT MOD BP 99901: 73.37

## 2023-04-11 PROCEDURE — 93306 TTE W/DOPPLER COMPLETE: CPT

## 2023-04-11 PROCEDURE — 93306 TTE W/DOPPLER COMPLETE: CPT | Mod: 26 | Performed by: INTERNAL MEDICINE

## 2023-06-08 ENCOUNTER — OFFICE VISIT (OUTPATIENT)
Dept: MEDICAL GROUP | Facility: LAB | Age: 78
End: 2023-06-08
Payer: COMMERCIAL

## 2023-06-08 VITALS
DIASTOLIC BLOOD PRESSURE: 70 MMHG | HEART RATE: 72 BPM | RESPIRATION RATE: 16 BRPM | TEMPERATURE: 97.6 F | HEIGHT: 63 IN | SYSTOLIC BLOOD PRESSURE: 140 MMHG | BODY MASS INDEX: 29.59 KG/M2 | OXYGEN SATURATION: 96 % | WEIGHT: 167 LBS

## 2023-06-08 DIAGNOSIS — H69.91 DYSFUNCTION OF RIGHT EUSTACHIAN TUBE: ICD-10-CM

## 2023-06-08 DIAGNOSIS — J01.00 SUBACUTE MAXILLARY SINUSITIS: ICD-10-CM

## 2023-06-08 PROCEDURE — 3078F DIAST BP <80 MM HG: CPT | Performed by: NURSE PRACTITIONER

## 2023-06-08 PROCEDURE — 99213 OFFICE O/P EST LOW 20 MIN: CPT | Performed by: NURSE PRACTITIONER

## 2023-06-08 PROCEDURE — 3077F SYST BP >= 140 MM HG: CPT | Performed by: NURSE PRACTITIONER

## 2023-06-08 RX ORDER — AMOXICILLIN AND CLAVULANATE POTASSIUM 875; 125 MG/1; MG/1
1 TABLET, FILM COATED ORAL 2 TIMES DAILY
Qty: 14 TABLET | Refills: 0 | Status: SHIPPED | OUTPATIENT
Start: 2023-06-08 | End: 2023-12-12

## 2023-06-08 RX ORDER — METHYLPREDNISOLONE 4 MG/1
TABLET ORAL
Qty: 21 TABLET | Refills: 0 | Status: SHIPPED | OUTPATIENT
Start: 2023-06-08 | End: 2023-12-12

## 2023-06-08 ASSESSMENT — FIBROSIS 4 INDEX: FIB4 SCORE: 2.19

## 2023-06-08 NOTE — PROGRESS NOTES
"Chief Complaint   Patient presents with    Otalgia       HPI:  Gali is a 77-year-old female here with complaint of right ear pain, present for several weeks.  Describes the pain as sharp and radiates from ear to right sided posterior neck.  Denies injuries or trauma.  Occasionally hurts to lie on her right side.  Is using Flonase without improvement.  History of the same a few years ago and tells me that drops were helpful.  I see that she was also prescribed a steroid in 2021 for this.  She denies any other associated symptoms such as right ear drainage, fevers or chills.  Mentions chronic allergies with congestion and sneezing.  Mucus has been clear.    Exam:  BP (!) 140/70 (BP Location: Left arm, Patient Position: Sitting, BP Cuff Size: Large adult)   Pulse 72   Temp 36.4 °C (97.6 °F)   Resp 16   Ht 1.594 m (5' 2.75\")   Wt 75.8 kg (167 lb)   SpO2 96%    Gen. appears healthy in no distress   Skin appropriate for sex and age   Neck trachea is midline.  She does not have any significantly enlarged cervical lymphadenopathy.  She does have tenderness to right cervical paraspinal muscles without overlying rash, erythema or wound.  She does not have neck bony tenderness.  She does have full range of motion of her neck.  ENT: Right tympanic membrane translucent as is left.  She does not have any abnormalities to either ear canal.  She does not have a rash or wound to either external ear.  Oropharynx without erythema.  Nasal mucosa boggy.  No sinus tenderness.  Lungs unlabored breathing  Heart regular rate  Neuro gait and station normal   Psych appropriate, calm, interactive, well-groomed        A/P:  #1-eustachian tube dysfunction: Trial of a Medrol Dosepak to decrease inflammation.  Discussed benign exam.  Continue Flonase but discussed nasal moisturizers.  Discussed side effects and how to take steroids.  She will follow-up with me in a week on MyChart if symptoms have not improved, sooner with " worsening.      She also mentioned that she is traveling to [place] in July and likes to travel with an antibiotic in case she gets an infection of any type and was prescribed Augmentin which she has done well within the past.  We discussed starting antibiotics after approximately 5 to 7 days of an illness if it involves purulent mucus, low-grade fever, sinus pain/pressure, sore throat, dysuria.  We discussed not starting antibiotics in the first 24 hours of diarrhea.  I encouraged her to seek medical care if she does not improve on antibiotics or if she worsens in the meantime.  She verbalized understanding of the difference between viral and bacterial infections as well as when to start antibiotics.

## 2023-12-10 SDOH — ECONOMIC STABILITY: TRANSPORTATION INSECURITY
IN THE PAST 12 MONTHS, HAS LACK OF TRANSPORTATION KEPT YOU FROM MEETINGS, WORK, OR FROM GETTING THINGS NEEDED FOR DAILY LIVING?: NO

## 2023-12-10 SDOH — ECONOMIC STABILITY: TRANSPORTATION INSECURITY
IN THE PAST 12 MONTHS, HAS THE LACK OF TRANSPORTATION KEPT YOU FROM MEDICAL APPOINTMENTS OR FROM GETTING MEDICATIONS?: NO

## 2023-12-10 SDOH — ECONOMIC STABILITY: FOOD INSECURITY: WITHIN THE PAST 12 MONTHS, THE FOOD YOU BOUGHT JUST DIDN'T LAST AND YOU DIDN'T HAVE MONEY TO GET MORE.: NEVER TRUE

## 2023-12-10 SDOH — HEALTH STABILITY: PHYSICAL HEALTH: ON AVERAGE, HOW MANY MINUTES DO YOU ENGAGE IN EXERCISE AT THIS LEVEL?: 60 MIN

## 2023-12-10 SDOH — ECONOMIC STABILITY: INCOME INSECURITY: IN THE LAST 12 MONTHS, WAS THERE A TIME WHEN YOU WERE NOT ABLE TO PAY THE MORTGAGE OR RENT ON TIME?: NO

## 2023-12-10 SDOH — ECONOMIC STABILITY: HOUSING INSECURITY
IN THE LAST 12 MONTHS, WAS THERE A TIME WHEN YOU DID NOT HAVE A STEADY PLACE TO SLEEP OR SLEPT IN A SHELTER (INCLUDING NOW)?: NO

## 2023-12-10 SDOH — ECONOMIC STABILITY: FOOD INSECURITY: WITHIN THE PAST 12 MONTHS, YOU WORRIED THAT YOUR FOOD WOULD RUN OUT BEFORE YOU GOT MONEY TO BUY MORE.: NEVER TRUE

## 2023-12-10 SDOH — HEALTH STABILITY: PHYSICAL HEALTH: ON AVERAGE, HOW MANY DAYS PER WEEK DO YOU ENGAGE IN MODERATE TO STRENUOUS EXERCISE (LIKE A BRISK WALK)?: 2 DAYS

## 2023-12-10 SDOH — ECONOMIC STABILITY: TRANSPORTATION INSECURITY
IN THE PAST 12 MONTHS, HAS LACK OF RELIABLE TRANSPORTATION KEPT YOU FROM MEDICAL APPOINTMENTS, MEETINGS, WORK OR FROM GETTING THINGS NEEDED FOR DAILY LIVING?: NO

## 2023-12-10 SDOH — HEALTH STABILITY: MENTAL HEALTH
STRESS IS WHEN SOMEONE FEELS TENSE, NERVOUS, ANXIOUS, OR CAN'T SLEEP AT NIGHT BECAUSE THEIR MIND IS TROUBLED. HOW STRESSED ARE YOU?: NOT AT ALL

## 2023-12-10 SDOH — ECONOMIC STABILITY: HOUSING INSECURITY: IN THE LAST 12 MONTHS, HOW MANY PLACES HAVE YOU LIVED?: 1

## 2023-12-10 SDOH — ECONOMIC STABILITY: INCOME INSECURITY: HOW HARD IS IT FOR YOU TO PAY FOR THE VERY BASICS LIKE FOOD, HOUSING, MEDICAL CARE, AND HEATING?: NOT HARD AT ALL

## 2023-12-10 ASSESSMENT — SOCIAL DETERMINANTS OF HEALTH (SDOH)
HOW OFTEN DO YOU HAVE SIX OR MORE DRINKS ON ONE OCCASION: NEVER
DO YOU BELONG TO ANY CLUBS OR ORGANIZATIONS SUCH AS CHURCH GROUPS UNIONS, FRATERNAL OR ATHLETIC GROUPS, OR SCHOOL GROUPS?: YES
IN A TYPICAL WEEK, HOW MANY TIMES DO YOU TALK ON THE PHONE WITH FAMILY, FRIENDS, OR NEIGHBORS?: THREE TIMES A WEEK
WITHIN THE PAST 12 MONTHS, YOU WORRIED THAT YOUR FOOD WOULD RUN OUT BEFORE YOU GOT THE MONEY TO BUY MORE: NEVER TRUE
IN A TYPICAL WEEK, HOW MANY TIMES DO YOU TALK ON THE PHONE WITH FAMILY, FRIENDS, OR NEIGHBORS?: THREE TIMES A WEEK
HOW OFTEN DO YOU ATTENT MEETINGS OF THE CLUB OR ORGANIZATION YOU BELONG TO?: MORE THAN 4 TIMES PER YEAR
HOW OFTEN DO YOU ATTEND CHURCH OR RELIGIOUS SERVICES?: MORE THAN 4 TIMES PER YEAR
HOW OFTEN DO YOU GET TOGETHER WITH FRIENDS OR RELATIVES?: MORE THAN THREE TIMES A WEEK
HOW OFTEN DO YOU HAVE A DRINK CONTAINING ALCOHOL: PATIENT DECLINED
HOW OFTEN DO YOU ATTENT MEETINGS OF THE CLUB OR ORGANIZATION YOU BELONG TO?: MORE THAN 4 TIMES PER YEAR
DO YOU BELONG TO ANY CLUBS OR ORGANIZATIONS SUCH AS CHURCH GROUPS UNIONS, FRATERNAL OR ATHLETIC GROUPS, OR SCHOOL GROUPS?: YES
HOW OFTEN DO YOU GET TOGETHER WITH FRIENDS OR RELATIVES?: MORE THAN THREE TIMES A WEEK
HOW HARD IS IT FOR YOU TO PAY FOR THE VERY BASICS LIKE FOOD, HOUSING, MEDICAL CARE, AND HEATING?: NOT HARD AT ALL
HOW MANY DRINKS CONTAINING ALCOHOL DO YOU HAVE ON A TYPICAL DAY WHEN YOU ARE DRINKING: PATIENT DECLINED
HOW OFTEN DO YOU ATTEND CHURCH OR RELIGIOUS SERVICES?: MORE THAN 4 TIMES PER YEAR

## 2023-12-10 ASSESSMENT — LIFESTYLE VARIABLES
AUDIT-C TOTAL SCORE: -1
HOW OFTEN DO YOU HAVE A DRINK CONTAINING ALCOHOL: PATIENT DECLINED
SKIP TO QUESTIONS 9-10: 0
HOW MANY STANDARD DRINKS CONTAINING ALCOHOL DO YOU HAVE ON A TYPICAL DAY: PATIENT DECLINED
HOW OFTEN DO YOU HAVE SIX OR MORE DRINKS ON ONE OCCASION: NEVER

## 2023-12-12 ENCOUNTER — HOSPITAL ENCOUNTER (OUTPATIENT)
Dept: LAB | Facility: MEDICAL CENTER | Age: 78
End: 2023-12-12
Attending: NURSE PRACTITIONER
Payer: COMMERCIAL

## 2023-12-12 ENCOUNTER — OFFICE VISIT (OUTPATIENT)
Dept: MEDICAL GROUP | Facility: LAB | Age: 78
End: 2023-12-12
Payer: COMMERCIAL

## 2023-12-12 VITALS
WEIGHT: 164 LBS | HEART RATE: 74 BPM | BODY MASS INDEX: 29.06 KG/M2 | HEIGHT: 63 IN | DIASTOLIC BLOOD PRESSURE: 70 MMHG | OXYGEN SATURATION: 97 % | TEMPERATURE: 96.6 F | RESPIRATION RATE: 16 BRPM | SYSTOLIC BLOOD PRESSURE: 140 MMHG

## 2023-12-12 DIAGNOSIS — I10 ESSENTIAL (PRIMARY) HYPERTENSION: ICD-10-CM

## 2023-12-12 DIAGNOSIS — E55.9 VITAMIN D DEFICIENCY: ICD-10-CM

## 2023-12-12 DIAGNOSIS — G89.29 CHRONIC PAIN OF BOTH KNEES: ICD-10-CM

## 2023-12-12 DIAGNOSIS — N28.9 RENAL DYSFUNCTION: ICD-10-CM

## 2023-12-12 DIAGNOSIS — I77.810 AORTIC ROOT DILATATION (HCC): ICD-10-CM

## 2023-12-12 DIAGNOSIS — E78.49 OTHER HYPERLIPIDEMIA: ICD-10-CM

## 2023-12-12 DIAGNOSIS — I35.1 MILD AORTIC INSUFFICIENCY: ICD-10-CM

## 2023-12-12 DIAGNOSIS — M25.561 CHRONIC PAIN OF BOTH KNEES: ICD-10-CM

## 2023-12-12 DIAGNOSIS — J30.2 OTHER SEASONAL ALLERGIC RHINITIS: ICD-10-CM

## 2023-12-12 DIAGNOSIS — Z00.00 MEDICARE ANNUAL WELLNESS VISIT, SUBSEQUENT: ICD-10-CM

## 2023-12-12 DIAGNOSIS — M25.562 CHRONIC PAIN OF BOTH KNEES: ICD-10-CM

## 2023-12-12 LAB
25(OH)D3 SERPL-MCNC: 32 NG/ML (ref 30–100)
ALBUMIN SERPL BCP-MCNC: 4.5 G/DL (ref 3.2–4.9)
ALBUMIN/GLOB SERPL: 1.5 G/DL
ALP SERPL-CCNC: 105 U/L (ref 30–99)
ALT SERPL-CCNC: 17 U/L (ref 2–50)
ANION GAP SERPL CALC-SCNC: 11 MMOL/L (ref 7–16)
AST SERPL-CCNC: 19 U/L (ref 12–45)
BASOPHILS # BLD AUTO: 1 % (ref 0–1.8)
BASOPHILS # BLD: 0.05 K/UL (ref 0–0.12)
BILIRUB SERPL-MCNC: 0.5 MG/DL (ref 0.1–1.5)
BUN SERPL-MCNC: 22 MG/DL (ref 8–22)
CALCIUM ALBUM COR SERPL-MCNC: 9.1 MG/DL (ref 8.5–10.5)
CALCIUM SERPL-MCNC: 9.5 MG/DL (ref 8.5–10.5)
CHLORIDE SERPL-SCNC: 105 MMOL/L (ref 96–112)
CHOLEST SERPL-MCNC: 215 MG/DL (ref 100–199)
CO2 SERPL-SCNC: 25 MMOL/L (ref 20–33)
CREAT SERPL-MCNC: 0.86 MG/DL (ref 0.5–1.4)
EOSINOPHIL # BLD AUTO: 0.13 K/UL (ref 0–0.51)
EOSINOPHIL NFR BLD: 2.6 % (ref 0–6.9)
ERYTHROCYTE [DISTWIDTH] IN BLOOD BY AUTOMATED COUNT: 43.7 FL (ref 35.9–50)
FASTING STATUS PATIENT QL REPORTED: NORMAL
GFR SERPLBLD CREATININE-BSD FMLA CKD-EPI: 68 ML/MIN/1.73 M 2
GLOBULIN SER CALC-MCNC: 3.1 G/DL (ref 1.9–3.5)
GLUCOSE SERPL-MCNC: 96 MG/DL (ref 65–99)
HCT VFR BLD AUTO: 41.7 % (ref 37–47)
HDLC SERPL-MCNC: 71 MG/DL
HGB BLD-MCNC: 13.8 G/DL (ref 12–16)
IMM GRANULOCYTES # BLD AUTO: 0 K/UL (ref 0–0.11)
IMM GRANULOCYTES NFR BLD AUTO: 0 % (ref 0–0.9)
LDLC SERPL CALC-MCNC: 125 MG/DL
LYMPHOCYTES # BLD AUTO: 1.37 K/UL (ref 1–4.8)
LYMPHOCYTES NFR BLD: 27.8 % (ref 22–41)
MCH RBC QN AUTO: 30.3 PG (ref 27–33)
MCHC RBC AUTO-ENTMCNC: 33.1 G/DL (ref 32.2–35.5)
MCV RBC AUTO: 91.6 FL (ref 81.4–97.8)
MONOCYTES # BLD AUTO: 0.36 K/UL (ref 0–0.85)
MONOCYTES NFR BLD AUTO: 7.3 % (ref 0–13.4)
NEUTROPHILS # BLD AUTO: 3.02 K/UL (ref 1.82–7.42)
NEUTROPHILS NFR BLD: 61.3 % (ref 44–72)
NRBC # BLD AUTO: 0 K/UL
NRBC BLD-RTO: 0 /100 WBC (ref 0–0.2)
PLATELET # BLD AUTO: 172 K/UL (ref 164–446)
PMV BLD AUTO: 11.3 FL (ref 9–12.9)
POTASSIUM SERPL-SCNC: 4.4 MMOL/L (ref 3.6–5.5)
PROT SERPL-MCNC: 7.6 G/DL (ref 6–8.2)
RBC # BLD AUTO: 4.55 M/UL (ref 4.2–5.4)
SODIUM SERPL-SCNC: 141 MMOL/L (ref 135–145)
TRIGL SERPL-MCNC: 93 MG/DL (ref 0–149)
TSH SERPL DL<=0.005 MIU/L-ACNC: 1.16 UIU/ML (ref 0.38–5.33)
WBC # BLD AUTO: 4.9 K/UL (ref 4.8–10.8)

## 2023-12-12 PROCEDURE — 80061 LIPID PANEL: CPT

## 2023-12-12 PROCEDURE — 80053 COMPREHEN METABOLIC PANEL: CPT

## 2023-12-12 PROCEDURE — 84443 ASSAY THYROID STIM HORMONE: CPT

## 2023-12-12 PROCEDURE — 36415 COLL VENOUS BLD VENIPUNCTURE: CPT

## 2023-12-12 PROCEDURE — 82306 VITAMIN D 25 HYDROXY: CPT

## 2023-12-12 PROCEDURE — 85025 COMPLETE CBC W/AUTO DIFF WBC: CPT

## 2023-12-12 PROCEDURE — 3078F DIAST BP <80 MM HG: CPT | Performed by: NURSE PRACTITIONER

## 2023-12-12 PROCEDURE — 3077F SYST BP >= 140 MM HG: CPT | Performed by: NURSE PRACTITIONER

## 2023-12-12 PROCEDURE — G0439 PPPS, SUBSEQ VISIT: HCPCS | Performed by: NURSE PRACTITIONER

## 2023-12-12 RX ORDER — CHOLECALCIFEROL (VITAMIN D3) 125 MCG
2000 CAPSULE ORAL DAILY
COMMUNITY
End: 2024-02-13

## 2023-12-12 ASSESSMENT — ACTIVITIES OF DAILY LIVING (ADL): BATHING_REQUIRES_ASSISTANCE: 0

## 2023-12-12 ASSESSMENT — ENCOUNTER SYMPTOMS: GENERAL WELL-BEING: FAIR

## 2023-12-12 ASSESSMENT — PATIENT HEALTH QUESTIONNAIRE - PHQ9: CLINICAL INTERPRETATION OF PHQ2 SCORE: 0

## 2023-12-12 ASSESSMENT — FIBROSIS 4 INDEX: FIB4 SCORE: 2.22

## 2023-12-12 NOTE — PROGRESS NOTES
Chief Complaint   Patient presents with    Medicare Annual Wellness       HPI:  CC is a 78 y.o. here for Medicare Annual Wellness Visit.  Feeling well.  [place] from [place] recently and had a great trip.  Daughter removed here recently.  Two grandkids at [facility] with eye doc, dermatology, cardiology, dentist.  Sees Dr. Dia for cardiology.  Echo [facility] and rescheduled for one in March.   Walks for exercise.     Patient Active Problem List    Diagnosis Date Noted    Aortic root dilatation (HCC) 07/14/2022    Essential (primary) hypertension 07/12/2022    Mild aortic insufficiency - Dr. Dia 11/29/2018    Chronic pain of both knees 11/29/2018    Other hyperlipidemia 08/29/2016    Renal dysfunction 08/29/2016    Other seasonal allergic rhinitis 05/09/2016       Current Outpatient Medications:     Vitamin D3, 2,000 Units, Oral, DAILY    fluticasone, 2 Spray, Nasal, DAILY    Multiple Vitamins-Minerals (MULTIVITAMIN ADULTS PO), Take  by mouth.    telmisartan, 1.5 tablets/day    Current supplements as per medication list.     Allergies: Iodine    Current social contact/activities: play cards/ Druze/ book club/ bunco/ dinner with friends /family     She  reports that she has never smoked. She has never used smokeless tobacco. She reports current alcohol use of about 0.6 oz of alcohol per week. She reports that she does not use drugs.  Counseling given: Not Answered      ROS:    Gait: Uses no assistive device  Ostomy: No  Other tubes: No  Amputations: No  Chronic oxygen use: No  Last eye exam: 10/2023  Wears hearing aids: No   : Denies any urinary leakage during the last 6 months    Screening:    Depression Screening  Little interest or pleasure in doing things?  0 - not at all  Feeling down, depressed , or hopeless? 0 - not at all  Patient Health Questionnaire Score: 0     If depressive symptoms identified deferred to follow up visit unless specifically addressed in assessment and plan.    Interpretation of  PHQ-9 Total Score   Score Severity   1-4 No Depression   5-9 Mild Depression   10-14 Moderate Depression   15-19 Moderately Severe Depression   20-27 Severe Depression    Screening for Cognitive Impairment  Do you or any of your friends or family members have any concern about your memory? No  Three Minute Recall (Banana, Sunrise, Chair) 3/3    Barron clock face with all 12 numbers and set the hands to show 20 past 8.  Yes    Cognitive concerns identified deferred for follow up unless specifically addressed in assessment and plan.    Fall Risk Assessment  Has the patient had two or more falls in the last year or any fall with injury in the last year?  No    Safety Assessment  Do you always wear your seatbelt?  Yes  Any changes to home needed to function safely? No  Difficulty hearing.  No  Patient counseled about all safety risks that were identified.    Functional Assessment ADLs  Are there any barriers preventing you from cooking for yourself or meeting nutritional needs?  No.    Are there any barriers preventing you from driving safely or obtaining transportation?  No.    Are there any barriers preventing you from using a telephone or calling for help?  No    Are there any barriers preventing you from shopping?  No.    Are there any barriers preventing you from taking care of your own finances?  No    Are there any barriers preventing you from managing your medications?  No    Are there any barriers preventing you from showering, bathing or dressing yourself? No    Are there any barriers preventing you from doing housework or laundry? No  Are there any barriers preventing you from using the toilet?No  Are you currently engaging in any exercise or physical activity?  Yes.      Self-Assessment of Health  What is your perception of your health? Fair  Do you sleep more than six hours a night? Yes  In the past 7 days, how much did pain keep you from doing your normal work? None  Do you spend quality time with family or  friends (virtually or in person)? Yes  Do you usually eat a heart healthy diet that constists of a variety of fruits, vegetables, whole grains and fiber? Yes  Do you eat foods high in fat and/or Fast Food more than three times per week? No    Advance Care Planning  Do you have an Advance Directive, Living Will, Durable Power of , or POLST? Yes  Advance Directive       is on file      Health Maintenance Summary            Overdue - COVID-19 Vaccine (1) Overdue - never done      No completion history exists for this topic.              Overdue - Pneumococcal Vaccine: 65+ Years (1 - PCV) Overdue - never done      No completion history exists for this topic.              Overdue - Annual Wellness Visit (Every 366 Days) Overdue since 12/9/2023 12/08/2022  Visit Dx: Medicare annual wellness visit, subsequent    12/07/2021  Level of Service: SD ANNUAL WELLNESS VISIT-INCLUDES PPPS SUBSEQUE*    12/07/2021  Visit Dx: Medicare annual wellness visit, subsequent    12/03/2020  Visit Dx: Medicare annual wellness visit, subsequent    12/02/2019  Subsequent Annual Wellness Visit - Includes PPPS ()    Only the first 5 history entries have been loaded, but more history exists.              Postponed - Zoster (Shingles) Vaccines (1 of 2) Postponed until 5/12/2024      No completion history exists for this topic.              Postponed - IMM DTaP/Tdap/Td Vaccine (1 - Tdap) Postponed until 12/10/2024      No completion history exists for this topic.              Postponed - Influenza Vaccine (1) Postponed until 12/12/2024      No completion history exists for this topic.              Hepatitis A Vaccine (Hep A) (Series Information) Aged Out      No completion history exists for this topic.              HPV Vaccines (Series Information) Aged Out      No completion history exists for this topic.              Polio Vaccine (Inactivated Polio) (Series Information) Aged Out      No completion history exists for this topic.  "             Meningococcal Immunization (Series Information) Aged Out      No completion history exists for this topic.              Discontinued - Mammogram  Discontinued        Frequency changed to Never automatically (Topic No Longer Applies)    01/19/2023  MA-SCREENING MAMMO BILAT W/TOMOSYNTHESIS W/CAD    05/29/2019  [id number]    11/09/2017  [id number]    08/03/2016  MA-SCREEN MAMMO W/CAD-BILAT    Only the first 5 history entries have been loaded, but more history exists.              Discontinued - Bone Density Scan  Discontinued      09/22/2005  DS-BONE DENSITY STUDY (DEXA)              Discontinued - Hepatitis B Vaccine (Hep B)  Discontinued      No completion history exists for this topic.                    Patient Care Team:  JOHNNY Fowler as PCP - General (Family Medicine)  Piotr Jacobson M.D. as Consulting Physician (Dermatology)  Christian Chamberlain M.D. as Consulting Physician (Ophthalmology)  Michael Franco D.O. (Cardiovascular Disease (Cardiology))        Social History     Tobacco Use    Smoking status: Never    Smokeless tobacco: Never   Substance Use Topics    Alcohol use: Yes     Alcohol/week: 0.6 oz     Types: 1 Glasses of wine per week    Drug use: No     Family History   Problem Relation Age of Onset    No Known Problems Mother     Cancer Father      She  has a past medical history of Allergy, Chickenpox, Measles, and Mumps.   Past Surgical History:   Procedure Laterality Date    ABDOMINAL HYSTERECTOMY TOTAL      EYE SURGERY      cataract    HERNIA REPAIR      TONSILLECTOMY AND ADENOIDECTOMY         Exam:   BP (!) 140/70 (BP Location: Left arm, Patient Position: Sitting, BP Cuff Size: Large adult)   Pulse 74   Temp 35.9 °C (96.6 °F)   Resp 16   Ht 1.594 m (5' 2.75\")   Wt 74.4 kg (164 lb)   SpO2 97%  Body mass index is 29.28 kg/m².    Hearing -good.    Dentition good  Alert, oriented in no acute distress.  Eye contact is good, speech goal directed, " affect calm    Assessment and Plan. The following treatment and monitoring plan is recommended:    1. Medicare annual wellness visit, subsequent        2. Other hyperlipidemia  Lipid Profile    TSH WITH REFLEX TO FT4      3. Renal dysfunction        4. Essential (primary) hypertension  CBC WITH DIFFERENTIAL    Comp Metabolic Panel      5. Vitamin D deficiency  VITAMIN D,25 HYDROXY (DEFICIENCY)      6. Other seasonal allergic rhinitis        7. Mild aortic insufficiency - Dr. Dia        8. Chronic pain of both knees        9. Aortic root dilatation (HCC)          Recommend full updated lab panel.  Declines all vaccines.   Taking vit D daily.   Allergies not bothersome.   UTD with echo / cardiology.   Knee pain only in the cold and not bothersome to pt at this time.  Staying physically active.   Mammogram q 2 yrs as pt has never had breast cancer nor a family history of breast cancer.  Encouraged monthly self breast exams.  Colonoscopy and Pap smear no longer indicated.    Follow-up yearly.    Services suggested: No services needed at this time  Health Care Screening: Age-appropriate preventive services recommended by USPTF and ACIP covered by Medicare were discussed today. Services ordered if indicated and agreed upon by the patient.  Referrals offered: Community-based lifestyle interventions to reduce health risks and promote self-management and wellness, fall prevention, nutrition, physical activity, tobacco-use cessation, weight loss, and mental health services as per orders if indicated.    Discussion today about general wellness and lifestyle habits:    Prevent falls and reduce trip hazards; Cautioned about securing or removing rugs.  Have a working fire alarm and carbon monoxide detector;   Engage in regular physical activity and social activities     Follow-up: No follow-ups on file.

## 2024-02-13 ENCOUNTER — OFFICE VISIT (OUTPATIENT)
Dept: MEDICAL GROUP | Facility: LAB | Age: 79
End: 2024-02-13
Payer: COMMERCIAL

## 2024-02-13 VITALS
HEART RATE: 94 BPM | WEIGHT: 168.5 LBS | RESPIRATION RATE: 16 BRPM | SYSTOLIC BLOOD PRESSURE: 150 MMHG | DIASTOLIC BLOOD PRESSURE: 76 MMHG | BODY MASS INDEX: 31.01 KG/M2 | OXYGEN SATURATION: 97 % | HEIGHT: 62 IN | TEMPERATURE: 96.7 F

## 2024-02-13 DIAGNOSIS — H69.91 DYSFUNCTION OF RIGHT EUSTACHIAN TUBE: ICD-10-CM

## 2024-02-13 PROCEDURE — 99213 OFFICE O/P EST LOW 20 MIN: CPT | Performed by: NURSE PRACTITIONER

## 2024-02-13 PROCEDURE — 3078F DIAST BP <80 MM HG: CPT | Performed by: NURSE PRACTITIONER

## 2024-02-13 PROCEDURE — 3077F SYST BP >= 140 MM HG: CPT | Performed by: NURSE PRACTITIONER

## 2024-02-13 ASSESSMENT — FIBROSIS 4 INDEX: FIB4 SCORE: 2.14

## 2024-02-13 ASSESSMENT — PATIENT HEALTH QUESTIONNAIRE - PHQ9: CLINICAL INTERPRETATION OF PHQ2 SCORE: 0

## 2024-02-13 NOTE — PROGRESS NOTES
"Chief Complaint   Patient presents with    Ear Pain     Snapping sounds R ear [id number]       HPI:  78-year-old established female here with complaint of right ear discomfort, describing it as a snapping type sound with fullness.  Denies ear pain.  No other associated symptoms.  History of wax in her right ear last year with similar symptoms.  She has had an increase in allergy symptoms over the past few weeks.  She is currently using a steroid nasal spray.      Exam:   BP (!) 150/76 (BP Location: Right arm, Patient Position: Sitting, BP Cuff Size: Adult)   Pulse 94   Temp 35.9 °C (96.7 °F) (Temporal)   Resp 16   Ht 1.575 m (5' 2\")   Wt 76.4 kg (168 lb 8 oz)   SpO2 97%   BMI 30.82 kg/m²     Gen. appears healthy in no distress   Skin appropriate for sex and age   Neck trachea is midline  ENT: Right and left ear canal without any foreign bodies, wax or any abnormalities.  Tympanic membrane translucent bilaterally.  Lungs unlabored breathing  Heart regular rate  Neuro gait and station normal   Psych appropriate, calm, interactive, well-groomed       Assessment / Plan:  \"  1. Dysfunction of right eustachian tube          May continue Flonase 2 sprays each nostril.  Encouraged her to add in an antihistamine such as generic Claritin, Zyrtec or Allegra for the next week.  Encouraged her to utilize humidity in a steamy shower and blow her nose daily in the shower.  Follow-up 1 week if not improving, sooner with any worsening.  Just  "

## 2024-07-15 ENCOUNTER — ANESTHESIA EVENT (OUTPATIENT)
Dept: SURGERY | Facility: MEDICAL CENTER | Age: 79
End: 2024-07-15
Payer: COMMERCIAL

## 2024-07-15 ENCOUNTER — APPOINTMENT (OUTPATIENT)
Dept: RADIOLOGY | Facility: MEDICAL CENTER | Age: 79
End: 2024-07-15
Attending: EMERGENCY MEDICINE
Payer: COMMERCIAL

## 2024-07-15 ENCOUNTER — HOSPITAL ENCOUNTER (OUTPATIENT)
Facility: MEDICAL CENTER | Age: 79
End: 2024-07-23
Attending: EMERGENCY MEDICINE | Admitting: INTERNAL MEDICINE
Payer: COMMERCIAL
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
## 2024-07-15 DIAGNOSIS — S42.214A CLOSED NONDISPLACED FRACTURE OF SURGICAL NECK OF RIGHT HUMERUS, UNSPECIFIED FRACTURE MORPHOLOGY, INITIAL ENCOUNTER: ICD-10-CM

## 2024-07-15 DIAGNOSIS — S82.102A CLOSED FRACTURE OF PROXIMAL END OF LEFT TIBIA, UNSPECIFIED FRACTURE MORPHOLOGY, INITIAL ENCOUNTER: ICD-10-CM

## 2024-07-15 PROBLEM — S42.91XA CLOSED FRACTURE OF RIGHT SHOULDER: Status: ACTIVE | Noted: 2024-07-15

## 2024-07-15 LAB
ALBUMIN SERPL BCP-MCNC: 3.9 G/DL (ref 3.2–4.9)
ALBUMIN/GLOB SERPL: 1.3 G/DL
ALP SERPL-CCNC: 97 U/L (ref 30–99)
ALT SERPL-CCNC: 18 U/L (ref 2–50)
ANION GAP SERPL CALC-SCNC: 11 MMOL/L (ref 7–16)
AST SERPL-CCNC: 21 U/L (ref 12–45)
BASOPHILS # BLD AUTO: 0.3 % (ref 0–1.8)
BASOPHILS # BLD: 0.04 K/UL (ref 0–0.12)
BILIRUB SERPL-MCNC: 0.6 MG/DL (ref 0.1–1.5)
BUN SERPL-MCNC: 28 MG/DL (ref 8–22)
CALCIUM ALBUM COR SERPL-MCNC: 8.7 MG/DL (ref 8.5–10.5)
CALCIUM SERPL-MCNC: 8.6 MG/DL (ref 8.5–10.5)
CHLORIDE SERPL-SCNC: 103 MMOL/L (ref 96–112)
CO2 SERPL-SCNC: 21 MMOL/L (ref 20–33)
CREAT SERPL-MCNC: 0.91 MG/DL (ref 0.5–1.4)
EOSINOPHIL # BLD AUTO: 0.01 K/UL (ref 0–0.51)
EOSINOPHIL NFR BLD: 0.1 % (ref 0–6.9)
ERYTHROCYTE [DISTWIDTH] IN BLOOD BY AUTOMATED COUNT: 44.8 FL (ref 35.9–50)
GFR SERPLBLD CREATININE-BSD FMLA CKD-EPI: 63 ML/MIN/1.73 M 2
GLOBULIN SER CALC-MCNC: 2.9 G/DL (ref 1.9–3.5)
GLUCOSE SERPL-MCNC: 121 MG/DL (ref 65–99)
HCT VFR BLD AUTO: 38.6 % (ref 37–47)
HGB BLD-MCNC: 12.8 G/DL (ref 12–16)
IMM GRANULOCYTES # BLD AUTO: 0.1 K/UL (ref 0–0.11)
IMM GRANULOCYTES NFR BLD AUTO: 0.7 % (ref 0–0.9)
LYMPHOCYTES # BLD AUTO: 0.86 K/UL (ref 1–4.8)
LYMPHOCYTES NFR BLD: 6.1 % (ref 22–41)
MCH RBC QN AUTO: 31.4 PG (ref 27–33)
MCHC RBC AUTO-ENTMCNC: 33.2 G/DL (ref 32.2–35.5)
MCV RBC AUTO: 94.8 FL (ref 81.4–97.8)
MONOCYTES # BLD AUTO: 0.72 K/UL (ref 0–0.85)
MONOCYTES NFR BLD AUTO: 5.1 % (ref 0–13.4)
NEUTROPHILS # BLD AUTO: 12.44 K/UL (ref 1.82–7.42)
NEUTROPHILS NFR BLD: 87.7 % (ref 44–72)
NRBC # BLD AUTO: 0 K/UL
NRBC BLD-RTO: 0 /100 WBC (ref 0–0.2)
PLATELET # BLD AUTO: 177 K/UL (ref 164–446)
PMV BLD AUTO: 10.9 FL (ref 9–12.9)
POTASSIUM SERPL-SCNC: 4.4 MMOL/L (ref 3.6–5.5)
PROT SERPL-MCNC: 6.8 G/DL (ref 6–8.2)
RBC # BLD AUTO: 4.07 M/UL (ref 4.2–5.4)
SODIUM SERPL-SCNC: 135 MMOL/L (ref 135–145)
WBC # BLD AUTO: 14.2 K/UL (ref 4.8–10.8)

## 2024-07-15 PROCEDURE — 36415 COLL VENOUS BLD VENIPUNCTURE: CPT

## 2024-07-15 PROCEDURE — 73030 X-RAY EXAM OF SHOULDER: CPT | Mod: RT

## 2024-07-15 PROCEDURE — 700102 HCHG RX REV CODE 250 W/ 637 OVERRIDE(OP): Performed by: EMERGENCY MEDICINE

## 2024-07-15 PROCEDURE — 700102 HCHG RX REV CODE 250 W/ 637 OVERRIDE(OP): Performed by: INTERNAL MEDICINE

## 2024-07-15 PROCEDURE — 700111 HCHG RX REV CODE 636 W/ 250 OVERRIDE (IP): Mod: JZ | Performed by: INTERNAL MEDICINE

## 2024-07-15 PROCEDURE — 85025 COMPLETE CBC W/AUTO DIFF WBC: CPT

## 2024-07-15 PROCEDURE — 770001 HCHG ROOM/CARE - MED/SURG/GYN PRIV*

## 2024-07-15 PROCEDURE — 73564 X-RAY EXAM KNEE 4 OR MORE: CPT | Mod: LT

## 2024-07-15 PROCEDURE — 99285 EMERGENCY DEPT VISIT HI MDM: CPT

## 2024-07-15 PROCEDURE — 80053 COMPREHEN METABOLIC PANEL: CPT

## 2024-07-15 PROCEDURE — 99222 1ST HOSP IP/OBS MODERATE 55: CPT | Performed by: INTERNAL MEDICINE

## 2024-07-15 PROCEDURE — 73700 CT LOWER EXTREMITY W/O DYE: CPT | Mod: LT

## 2024-07-15 PROCEDURE — A9270 NON-COVERED ITEM OR SERVICE: HCPCS | Performed by: EMERGENCY MEDICINE

## 2024-07-15 PROCEDURE — 99221 1ST HOSP IP/OBS SF/LOW 40: CPT | Mod: 57 | Performed by: STUDENT IN AN ORGANIZED HEALTH CARE EDUCATION/TRAINING PROGRAM

## 2024-07-15 PROCEDURE — A9270 NON-COVERED ITEM OR SERVICE: HCPCS | Performed by: INTERNAL MEDICINE

## 2024-07-15 PROCEDURE — 73590 X-RAY EXAM OF LOWER LEG: CPT | Mod: LT

## 2024-07-15 RX ORDER — OXYCODONE HYDROCHLORIDE 5 MG/1
5 TABLET ORAL ONCE
Status: COMPLETED | OUTPATIENT
Start: 2024-07-15 | End: 2024-07-15

## 2024-07-15 RX ORDER — ACETAMINOPHEN 500 MG
1000 TABLET ORAL ONCE
Status: CANCELLED | OUTPATIENT
Start: 2024-07-15 | End: 2024-07-15

## 2024-07-15 RX ORDER — OXYCODONE HCL 5 MG/5 ML
10 SOLUTION, ORAL ORAL
Status: CANCELLED | OUTPATIENT
Start: 2024-07-16

## 2024-07-15 RX ORDER — HYDRALAZINE HYDROCHLORIDE 20 MG/ML
5 INJECTION INTRAMUSCULAR; INTRAVENOUS
Status: CANCELLED | OUTPATIENT
Start: 2024-07-16

## 2024-07-15 RX ORDER — OXYCODONE HCL 5 MG/5 ML
5 SOLUTION, ORAL ORAL
Status: CANCELLED | OUTPATIENT
Start: 2024-07-16

## 2024-07-15 RX ORDER — HALOPERIDOL 5 MG/ML
1 INJECTION INTRAMUSCULAR
Status: CANCELLED | OUTPATIENT
Start: 2024-07-16

## 2024-07-15 RX ORDER — ONDANSETRON 4 MG/1
4 TABLET, ORALLY DISINTEGRATING ORAL EVERY 4 HOURS PRN
Status: DISCONTINUED | OUTPATIENT
Start: 2024-07-15 | End: 2024-07-23 | Stop reason: HOSPADM

## 2024-07-15 RX ORDER — ACETAMINOPHEN 500 MG
1000 TABLET ORAL EVERY 8 HOURS
Status: DISCONTINUED | OUTPATIENT
Start: 2024-07-15 | End: 2024-07-20

## 2024-07-15 RX ORDER — FLUTICASONE PROPIONATE 50 MCG
2 SPRAY, SUSPENSION (ML) NASAL DAILY
Status: DISCONTINUED | OUTPATIENT
Start: 2024-07-15 | End: 2024-07-15

## 2024-07-15 RX ORDER — ONDANSETRON 2 MG/ML
4 INJECTION INTRAMUSCULAR; INTRAVENOUS
Status: CANCELLED | OUTPATIENT
Start: 2024-07-16

## 2024-07-15 RX ORDER — MORPHINE SULFATE 4 MG/ML
4 INJECTION INTRAVENOUS
Status: DISCONTINUED | OUTPATIENT
Start: 2024-07-15 | End: 2024-07-21

## 2024-07-15 RX ORDER — TELMISARTAN 40 MG/1
40 TABLET ORAL EVERY EVENING
Status: ON HOLD | COMMUNITY
Start: 2024-05-14

## 2024-07-15 RX ORDER — HYDROMORPHONE HYDROCHLORIDE 1 MG/ML
0.2 INJECTION, SOLUTION INTRAMUSCULAR; INTRAVENOUS; SUBCUTANEOUS
Status: CANCELLED | OUTPATIENT
Start: 2024-07-16

## 2024-07-15 RX ORDER — IPRATROPIUM BROMIDE AND ALBUTEROL SULFATE 2.5; .5 MG/3ML; MG/3ML
3 SOLUTION RESPIRATORY (INHALATION)
Status: CANCELLED | OUTPATIENT
Start: 2024-07-16

## 2024-07-15 RX ORDER — ENOXAPARIN SODIUM 100 MG/ML
40 INJECTION SUBCUTANEOUS DAILY
Status: DISCONTINUED | OUTPATIENT
Start: 2024-07-15 | End: 2024-07-23 | Stop reason: HOSPADM

## 2024-07-15 RX ORDER — DIPHENHYDRAMINE HYDROCHLORIDE 50 MG/ML
12.5 INJECTION INTRAMUSCULAR; INTRAVENOUS
Status: CANCELLED | OUTPATIENT
Start: 2024-07-16

## 2024-07-15 RX ORDER — HYDROMORPHONE HYDROCHLORIDE 1 MG/ML
0.4 INJECTION, SOLUTION INTRAMUSCULAR; INTRAVENOUS; SUBCUTANEOUS
Status: CANCELLED | OUTPATIENT
Start: 2024-07-16

## 2024-07-15 RX ORDER — OXYCODONE HYDROCHLORIDE 5 MG/1
5 TABLET ORAL
Status: DISCONTINUED | OUTPATIENT
Start: 2024-07-15 | End: 2024-07-21

## 2024-07-15 RX ORDER — POLYETHYLENE GLYCOL 3350 17 G/17G
1 POWDER, FOR SOLUTION ORAL
Status: DISCONTINUED | OUTPATIENT
Start: 2024-07-15 | End: 2024-07-23 | Stop reason: HOSPADM

## 2024-07-15 RX ORDER — ONDANSETRON 2 MG/ML
4 INJECTION INTRAMUSCULAR; INTRAVENOUS EVERY 4 HOURS PRN
Status: DISCONTINUED | OUTPATIENT
Start: 2024-07-15 | End: 2024-07-23 | Stop reason: HOSPADM

## 2024-07-15 RX ORDER — AMOXICILLIN 250 MG
2 CAPSULE ORAL EVERY EVENING
Status: DISCONTINUED | OUTPATIENT
Start: 2024-07-15 | End: 2024-07-23 | Stop reason: HOSPADM

## 2024-07-15 RX ORDER — OXYCODONE HYDROCHLORIDE 10 MG/1
10 TABLET ORAL
Status: DISCONTINUED | OUTPATIENT
Start: 2024-07-15 | End: 2024-07-21

## 2024-07-15 RX ORDER — TELMISARTAN 40 MG/1
40 TABLET ORAL EVERY EVENING
Status: DISCONTINUED | OUTPATIENT
Start: 2024-07-15 | End: 2024-07-23 | Stop reason: HOSPADM

## 2024-07-15 RX ORDER — LABETALOL HYDROCHLORIDE 5 MG/ML
10 INJECTION, SOLUTION INTRAVENOUS EVERY 4 HOURS PRN
Status: DISCONTINUED | OUTPATIENT
Start: 2024-07-15 | End: 2024-07-23 | Stop reason: HOSPADM

## 2024-07-15 RX ORDER — SODIUM CHLORIDE, SODIUM LACTATE, POTASSIUM CHLORIDE, CALCIUM CHLORIDE 600; 310; 30; 20 MG/100ML; MG/100ML; MG/100ML; MG/100ML
INJECTION, SOLUTION INTRAVENOUS CONTINUOUS
Status: CANCELLED | OUTPATIENT
Start: 2024-07-16

## 2024-07-15 RX ORDER — EPHEDRINE SULFATE 50 MG/ML
5 INJECTION, SOLUTION INTRAVENOUS
Status: CANCELLED | OUTPATIENT
Start: 2024-07-16

## 2024-07-15 RX ORDER — HYDROMORPHONE HYDROCHLORIDE 1 MG/ML
0.1 INJECTION, SOLUTION INTRAMUSCULAR; INTRAVENOUS; SUBCUTANEOUS
Status: CANCELLED | OUTPATIENT
Start: 2024-07-16

## 2024-07-15 RX ADMIN — ENOXAPARIN SODIUM 40 MG: 100 INJECTION SUBCUTANEOUS at 20:58

## 2024-07-15 RX ADMIN — OXYCODONE HYDROCHLORIDE 10 MG: 10 TABLET ORAL at 17:44

## 2024-07-15 RX ADMIN — OXYCODONE HYDROCHLORIDE 10 MG: 10 TABLET ORAL at 20:57

## 2024-07-15 RX ADMIN — OXYCODONE 5 MG: 5 TABLET ORAL at 15:32

## 2024-07-15 RX ADMIN — ACETAMINOPHEN 1000 MG: 500 TABLET ORAL at 22:47

## 2024-07-15 RX ADMIN — TELMISARTAN 40 MG: 40 TABLET ORAL at 20:57

## 2024-07-15 ASSESSMENT — ENCOUNTER SYMPTOMS
COUGH: 0
PALPITATIONS: 0
BRUISES/BLEEDS EASILY: 0
NECK PAIN: 0
HEMOPTYSIS: 0
FOCAL WEAKNESS: 0
WEIGHT LOSS: 0
SPUTUM PRODUCTION: 0
CHILLS: 0
TREMORS: 0
VOMITING: 0
ORTHOPNEA: 0
POLYDIPSIA: 0
NAUSEA: 0
BACK PAIN: 0
FALLS: 1
FEVER: 0
SPEECH CHANGE: 0
DOUBLE VISION: 0
BLURRED VISION: 0
FLANK PAIN: 0
HALLUCINATIONS: 0
HEARTBURN: 0
HEADACHES: 0
NERVOUS/ANXIOUS: 0
PHOTOPHOBIA: 0

## 2024-07-15 ASSESSMENT — PAIN DESCRIPTION - PAIN TYPE
TYPE: ACUTE PAIN

## 2024-07-15 ASSESSMENT — FIBROSIS 4 INDEX: FIB4 SCORE: 2.17

## 2024-07-15 ASSESSMENT — LIFESTYLE VARIABLES: SUBSTANCE_ABUSE: 0

## 2024-07-16 ENCOUNTER — ANESTHESIA (OUTPATIENT)
Dept: SURGERY | Facility: MEDICAL CENTER | Age: 79
End: 2024-07-16
Payer: COMMERCIAL

## 2024-07-16 ENCOUNTER — APPOINTMENT (OUTPATIENT)
Dept: RADIOLOGY | Facility: MEDICAL CENTER | Age: 79
End: 2024-07-16
Attending: STUDENT IN AN ORGANIZED HEALTH CARE EDUCATION/TRAINING PROGRAM
Payer: COMMERCIAL

## 2024-07-16 PROBLEM — Z01.818 ENCOUNTER FOR PERIOPERATIVE CONSULTATION: Status: ACTIVE | Noted: 2024-07-16

## 2024-07-16 LAB
ALBUMIN SERPL BCP-MCNC: 3.7 G/DL (ref 3.2–4.9)
ALBUMIN/GLOB SERPL: 1.4 G/DL
ALP SERPL-CCNC: 88 U/L (ref 30–99)
ALT SERPL-CCNC: 17 U/L (ref 2–50)
ANION GAP SERPL CALC-SCNC: 12 MMOL/L (ref 7–16)
AST SERPL-CCNC: 22 U/L (ref 12–45)
BASOPHILS # BLD AUTO: 0.1 % (ref 0–1.8)
BASOPHILS # BLD: 0.01 K/UL (ref 0–0.12)
BILIRUB SERPL-MCNC: 0.8 MG/DL (ref 0.1–1.5)
BUN SERPL-MCNC: 27 MG/DL (ref 8–22)
CALCIUM ALBUM COR SERPL-MCNC: 8.8 MG/DL (ref 8.5–10.5)
CALCIUM SERPL-MCNC: 8.6 MG/DL (ref 8.5–10.5)
CHLORIDE SERPL-SCNC: 100 MMOL/L (ref 96–112)
CO2 SERPL-SCNC: 21 MMOL/L (ref 20–33)
CREAT SERPL-MCNC: 0.75 MG/DL (ref 0.5–1.4)
EOSINOPHIL # BLD AUTO: 0.01 K/UL (ref 0–0.51)
EOSINOPHIL NFR BLD: 0.1 % (ref 0–6.9)
ERYTHROCYTE [DISTWIDTH] IN BLOOD BY AUTOMATED COUNT: 44.7 FL (ref 35.9–50)
GFR SERPLBLD CREATININE-BSD FMLA CKD-EPI: 80 ML/MIN/1.73 M 2
GLOBULIN SER CALC-MCNC: 2.7 G/DL (ref 1.9–3.5)
GLUCOSE SERPL-MCNC: 110 MG/DL (ref 65–99)
HCT VFR BLD AUTO: 33.3 % (ref 37–47)
HGB BLD-MCNC: 11.2 G/DL (ref 12–16)
IMM GRANULOCYTES # BLD AUTO: 0.04 K/UL (ref 0–0.11)
IMM GRANULOCYTES NFR BLD AUTO: 0.6 % (ref 0–0.9)
LYMPHOCYTES # BLD AUTO: 0.9 K/UL (ref 1–4.8)
LYMPHOCYTES NFR BLD: 12.7 % (ref 22–41)
MCH RBC QN AUTO: 31.3 PG (ref 27–33)
MCHC RBC AUTO-ENTMCNC: 33.6 G/DL (ref 32.2–35.5)
MCV RBC AUTO: 93 FL (ref 81.4–97.8)
MONOCYTES # BLD AUTO: 0.5 K/UL (ref 0–0.85)
MONOCYTES NFR BLD AUTO: 7 % (ref 0–13.4)
NEUTROPHILS # BLD AUTO: 5.65 K/UL (ref 1.82–7.42)
NEUTROPHILS NFR BLD: 79.5 % (ref 44–72)
NRBC # BLD AUTO: 0 K/UL
NRBC BLD-RTO: 0 /100 WBC (ref 0–0.2)
PLATELET # BLD AUTO: 160 K/UL (ref 164–446)
PMV BLD AUTO: 10.9 FL (ref 9–12.9)
POTASSIUM SERPL-SCNC: 3.9 MMOL/L (ref 3.6–5.5)
PROT SERPL-MCNC: 6.4 G/DL (ref 6–8.2)
RBC # BLD AUTO: 3.58 M/UL (ref 4.2–5.4)
SODIUM SERPL-SCNC: 133 MMOL/L (ref 135–145)
WBC # BLD AUTO: 7.1 K/UL (ref 4.8–10.8)

## 2024-07-16 PROCEDURE — 160035 HCHG PACU - 1ST 60 MINS PHASE I: Performed by: STUDENT IN AN ORGANIZED HEALTH CARE EDUCATION/TRAINING PROGRAM

## 2024-07-16 PROCEDURE — 502240 HCHG MISC OR SUPPLY RC 0272: Performed by: STUDENT IN AN ORGANIZED HEALTH CARE EDUCATION/TRAINING PROGRAM

## 2024-07-16 PROCEDURE — A9270 NON-COVERED ITEM OR SERVICE: HCPCS | Performed by: INTERNAL MEDICINE

## 2024-07-16 PROCEDURE — C1713 ANCHOR/SCREW BN/BN,TIS/BN: HCPCS | Performed by: STUDENT IN AN ORGANIZED HEALTH CARE EDUCATION/TRAINING PROGRAM

## 2024-07-16 PROCEDURE — 80053 COMPREHEN METABOLIC PANEL: CPT

## 2024-07-16 PROCEDURE — 700102 HCHG RX REV CODE 250 W/ 637 OVERRIDE(OP): Performed by: INTERNAL MEDICINE

## 2024-07-16 PROCEDURE — 160029 HCHG SURGERY MINUTES - 1ST 30 MINS LEVEL 4: Performed by: STUDENT IN AN ORGANIZED HEALTH CARE EDUCATION/TRAINING PROGRAM

## 2024-07-16 PROCEDURE — 160002 HCHG RECOVERY MINUTES (STAT): Performed by: STUDENT IN AN ORGANIZED HEALTH CARE EDUCATION/TRAINING PROGRAM

## 2024-07-16 PROCEDURE — 36415 COLL VENOUS BLD VENIPUNCTURE: CPT

## 2024-07-16 PROCEDURE — 73590 X-RAY EXAM OF LOWER LEG: CPT | Mod: LT

## 2024-07-16 PROCEDURE — 27759 TREATMENT OF TIBIA FRACTURE: CPT | Mod: LT | Performed by: STUDENT IN AN ORGANIZED HEALTH CARE EDUCATION/TRAINING PROGRAM

## 2024-07-16 PROCEDURE — 110371 HCHG SHELL REV 272: Performed by: STUDENT IN AN ORGANIZED HEALTH CARE EDUCATION/TRAINING PROGRAM

## 2024-07-16 PROCEDURE — 23605 CLTX PRX HMRL FX MNPJ+-TRACT: CPT | Mod: RT | Performed by: STUDENT IN AN ORGANIZED HEALTH CARE EDUCATION/TRAINING PROGRAM

## 2024-07-16 PROCEDURE — 700105 HCHG RX REV CODE 258: Performed by: ANESTHESIOLOGY

## 2024-07-16 PROCEDURE — 85025 COMPLETE CBC W/AUTO DIFF WBC: CPT

## 2024-07-16 PROCEDURE — 700111 HCHG RX REV CODE 636 W/ 250 OVERRIDE (IP): Performed by: ANESTHESIOLOGY

## 2024-07-16 PROCEDURE — 160041 HCHG SURGERY MINUTES - EA ADDL 1 MIN LEVEL 4: Performed by: STUDENT IN AN ORGANIZED HEALTH CARE EDUCATION/TRAINING PROGRAM

## 2024-07-16 PROCEDURE — 160048 HCHG OR STATISTICAL LEVEL 1-5: Performed by: STUDENT IN AN ORGANIZED HEALTH CARE EDUCATION/TRAINING PROGRAM

## 2024-07-16 PROCEDURE — 51798 US URINE CAPACITY MEASURE: CPT

## 2024-07-16 PROCEDURE — G0378 HOSPITAL OBSERVATION PER HR: HCPCS

## 2024-07-16 PROCEDURE — 160009 HCHG ANES TIME/MIN: Performed by: STUDENT IN AN ORGANIZED HEALTH CARE EDUCATION/TRAINING PROGRAM

## 2024-07-16 PROCEDURE — 700101 HCHG RX REV CODE 250: Performed by: ANESTHESIOLOGY

## 2024-07-16 PROCEDURE — 99233 SBSQ HOSP IP/OBS HIGH 50: CPT | Performed by: STUDENT IN AN ORGANIZED HEALTH CARE EDUCATION/TRAINING PROGRAM

## 2024-07-16 DEVICE — LOCKING SCREW DIA 5X45MM: Type: IMPLANTABLE DEVICE | Site: TIBIA | Status: FUNCTIONAL

## 2024-07-16 DEVICE — LOCKING SCREW DIA 5X75MM: Type: IMPLANTABLE DEVICE | Site: TIBIA | Status: FUNCTIONAL

## 2024-07-16 DEVICE — IMPLANTABLE DEVICE: Type: IMPLANTABLE DEVICE | Site: TIBIA | Status: FUNCTIONAL

## 2024-07-16 DEVICE — LOCKING SCREW DIA 5X52MM: Type: IMPLANTABLE DEVICE | Site: TIBIA | Status: FUNCTIONAL

## 2024-07-16 DEVICE — K-WIRE 3X285MM: Type: IMPLANTABLE DEVICE | Site: TIBIA | Status: FUNCTIONAL

## 2024-07-16 DEVICE — LOCKING SCREW DIA 5X40MM: Type: IMPLANTABLE DEVICE | Site: TIBIA | Status: FUNCTIONAL

## 2024-07-16 RX ORDER — SODIUM CHLORIDE, SODIUM LACTATE, POTASSIUM CHLORIDE, CALCIUM CHLORIDE 600; 310; 30; 20 MG/100ML; MG/100ML; MG/100ML; MG/100ML
INJECTION, SOLUTION INTRAVENOUS CONTINUOUS
Status: DISCONTINUED | OUTPATIENT
Start: 2024-07-16 | End: 2024-07-16 | Stop reason: HOSPADM

## 2024-07-16 RX ORDER — HYDROMORPHONE HYDROCHLORIDE 2 MG/ML
INJECTION, SOLUTION INTRAMUSCULAR; INTRAVENOUS; SUBCUTANEOUS PRN
Status: DISCONTINUED | OUTPATIENT
Start: 2024-07-16 | End: 2024-07-16 | Stop reason: SURG

## 2024-07-16 RX ORDER — OXYCODONE HCL 5 MG/5 ML
10 SOLUTION, ORAL ORAL
Status: DISCONTINUED | OUTPATIENT
Start: 2024-07-16 | End: 2024-07-16 | Stop reason: HOSPADM

## 2024-07-16 RX ORDER — LIDOCAINE HYDROCHLORIDE 20 MG/ML
INJECTION, SOLUTION EPIDURAL; INFILTRATION; INTRACAUDAL; PERINEURAL PRN
Status: DISCONTINUED | OUTPATIENT
Start: 2024-07-16 | End: 2024-07-16 | Stop reason: SURG

## 2024-07-16 RX ORDER — HALOPERIDOL 5 MG/ML
1 INJECTION INTRAMUSCULAR
Status: DISCONTINUED | OUTPATIENT
Start: 2024-07-16 | End: 2024-07-16 | Stop reason: HOSPADM

## 2024-07-16 RX ORDER — ONDANSETRON 2 MG/ML
INJECTION INTRAMUSCULAR; INTRAVENOUS PRN
Status: DISCONTINUED | OUTPATIENT
Start: 2024-07-16 | End: 2024-07-16 | Stop reason: SURG

## 2024-07-16 RX ORDER — HYDROMORPHONE HYDROCHLORIDE 1 MG/ML
0.1 INJECTION, SOLUTION INTRAMUSCULAR; INTRAVENOUS; SUBCUTANEOUS
Status: DISCONTINUED | OUTPATIENT
Start: 2024-07-16 | End: 2024-07-16 | Stop reason: HOSPADM

## 2024-07-16 RX ORDER — CEFAZOLIN SODIUM 1 G/3ML
INJECTION, POWDER, FOR SOLUTION INTRAMUSCULAR; INTRAVENOUS PRN
Status: DISCONTINUED | OUTPATIENT
Start: 2024-07-16 | End: 2024-07-16 | Stop reason: SURG

## 2024-07-16 RX ORDER — SODIUM CHLORIDE, SODIUM LACTATE, POTASSIUM CHLORIDE, CALCIUM CHLORIDE 600; 310; 30; 20 MG/100ML; MG/100ML; MG/100ML; MG/100ML
INJECTION, SOLUTION INTRAVENOUS
Status: DISCONTINUED | OUTPATIENT
Start: 2024-07-16 | End: 2024-07-16 | Stop reason: SURG

## 2024-07-16 RX ORDER — GLYCOPYRROLATE 0.2 MG/ML
INJECTION INTRAMUSCULAR; INTRAVENOUS PRN
Status: DISCONTINUED | OUTPATIENT
Start: 2024-07-16 | End: 2024-07-16 | Stop reason: SURG

## 2024-07-16 RX ORDER — KETOROLAC TROMETHAMINE 15 MG/ML
INJECTION, SOLUTION INTRAMUSCULAR; INTRAVENOUS PRN
Status: DISCONTINUED | OUTPATIENT
Start: 2024-07-16 | End: 2024-07-16 | Stop reason: SURG

## 2024-07-16 RX ORDER — DIPHENHYDRAMINE HYDROCHLORIDE 50 MG/ML
12.5 INJECTION INTRAMUSCULAR; INTRAVENOUS
Status: DISCONTINUED | OUTPATIENT
Start: 2024-07-16 | End: 2024-07-16 | Stop reason: HOSPADM

## 2024-07-16 RX ORDER — ESMOLOL HYDROCHLORIDE 10 MG/ML
INJECTION INTRAVENOUS PRN
Status: DISCONTINUED | OUTPATIENT
Start: 2024-07-16 | End: 2024-07-16 | Stop reason: SURG

## 2024-07-16 RX ORDER — PHENYLEPHRINE HCL IN 0.9% NACL 1 MG/10 ML
SYRINGE (ML) INTRAVENOUS PRN
Status: DISCONTINUED | OUTPATIENT
Start: 2024-07-16 | End: 2024-07-16 | Stop reason: SURG

## 2024-07-16 RX ORDER — DEXAMETHASONE SODIUM PHOSPHATE 4 MG/ML
INJECTION, SOLUTION INTRA-ARTICULAR; INTRALESIONAL; INTRAMUSCULAR; INTRAVENOUS; SOFT TISSUE PRN
Status: DISCONTINUED | OUTPATIENT
Start: 2024-07-16 | End: 2024-07-16 | Stop reason: SURG

## 2024-07-16 RX ORDER — OXYCODONE HCL 5 MG/5 ML
5 SOLUTION, ORAL ORAL
Status: DISCONTINUED | OUTPATIENT
Start: 2024-07-16 | End: 2024-07-16 | Stop reason: HOSPADM

## 2024-07-16 RX ORDER — HYDROMORPHONE HYDROCHLORIDE 1 MG/ML
0.4 INJECTION, SOLUTION INTRAMUSCULAR; INTRAVENOUS; SUBCUTANEOUS
Status: DISCONTINUED | OUTPATIENT
Start: 2024-07-16 | End: 2024-07-16 | Stop reason: HOSPADM

## 2024-07-16 RX ORDER — ONDANSETRON 2 MG/ML
4 INJECTION INTRAMUSCULAR; INTRAVENOUS
Status: DISCONTINUED | OUTPATIENT
Start: 2024-07-16 | End: 2024-07-16 | Stop reason: HOSPADM

## 2024-07-16 RX ORDER — HYDROMORPHONE HYDROCHLORIDE 1 MG/ML
0.2 INJECTION, SOLUTION INTRAMUSCULAR; INTRAVENOUS; SUBCUTANEOUS
Status: DISCONTINUED | OUTPATIENT
Start: 2024-07-16 | End: 2024-07-16 | Stop reason: HOSPADM

## 2024-07-16 RX ORDER — EPHEDRINE SULFATE 50 MG/ML
5 INJECTION, SOLUTION INTRAVENOUS
Status: DISCONTINUED | OUTPATIENT
Start: 2024-07-16 | End: 2024-07-16 | Stop reason: HOSPADM

## 2024-07-16 RX ADMIN — GLYCOPYRROLATE 0.2 MG: 0.2 INJECTION INTRAMUSCULAR; INTRAVENOUS at 18:04

## 2024-07-16 RX ADMIN — LIDOCAINE HYDROCHLORIDE 60 MG: 20 INJECTION, SOLUTION EPIDURAL; INFILTRATION; INTRACAUDAL at 17:51

## 2024-07-16 RX ADMIN — Medication 100 MCG: at 18:32

## 2024-07-16 RX ADMIN — ONDANSETRON 4 MG: 2 INJECTION INTRAMUSCULAR; INTRAVENOUS at 18:31

## 2024-07-16 RX ADMIN — Medication 100 MCG: at 18:17

## 2024-07-16 RX ADMIN — FENTANYL CITRATE 50 MCG: 50 INJECTION, SOLUTION INTRAMUSCULAR; INTRAVENOUS at 17:48

## 2024-07-16 RX ADMIN — Medication 100 MCG: at 18:23

## 2024-07-16 RX ADMIN — OXYCODONE HYDROCHLORIDE 10 MG: 10 TABLET ORAL at 11:20

## 2024-07-16 RX ADMIN — KETOROLAC TROMETHAMINE 15 MG: 15 INJECTION, SOLUTION INTRAMUSCULAR; INTRAVENOUS at 18:31

## 2024-07-16 RX ADMIN — ACETAMINOPHEN 1000 MG: 500 TABLET ORAL at 22:37

## 2024-07-16 RX ADMIN — OXYCODONE 5 MG: 5 TABLET ORAL at 14:48

## 2024-07-16 RX ADMIN — FENTANYL CITRATE 50 MCG: 50 INJECTION, SOLUTION INTRAMUSCULAR; INTRAVENOUS at 18:00

## 2024-07-16 RX ADMIN — ESMOLOL HYDROCHLORIDE 10 MG: 100 INJECTION, SOLUTION INTRAVENOUS at 18:12

## 2024-07-16 RX ADMIN — CEFAZOLIN 2 G: 1 INJECTION, POWDER, FOR SOLUTION INTRAMUSCULAR; INTRAVENOUS at 17:54

## 2024-07-16 RX ADMIN — HYDROMORPHONE HYDROCHLORIDE 0.2 MG: 2 INJECTION INTRAMUSCULAR; INTRAVENOUS; SUBCUTANEOUS at 18:33

## 2024-07-16 RX ADMIN — ACETAMINOPHEN 1000 MG: 500 TABLET ORAL at 14:49

## 2024-07-16 RX ADMIN — DEXAMETHASONE SODIUM PHOSPHATE 4 MG: 4 INJECTION INTRA-ARTICULAR; INTRALESIONAL; INTRAMUSCULAR; INTRAVENOUS; SOFT TISSUE at 17:58

## 2024-07-16 RX ADMIN — PROPOFOL 120 MG: 10 INJECTION, EMULSION INTRAVENOUS at 17:51

## 2024-07-16 RX ADMIN — ACETAMINOPHEN 1000 MG: 500 TABLET ORAL at 05:59

## 2024-07-16 RX ADMIN — HYDROMORPHONE HYDROCHLORIDE 0.4 MG: 2 INJECTION INTRAMUSCULAR; INTRAVENOUS; SUBCUTANEOUS at 18:10

## 2024-07-16 RX ADMIN — SODIUM CHLORIDE, POTASSIUM CHLORIDE, SODIUM LACTATE AND CALCIUM CHLORIDE: 600; 310; 30; 20 INJECTION, SOLUTION INTRAVENOUS at 17:45

## 2024-07-16 ASSESSMENT — COGNITIVE AND FUNCTIONAL STATUS - GENERAL
MOVING TO AND FROM BED TO CHAIR: TOTAL
PERSONAL GROOMING: TOTAL
DAILY ACTIVITIY SCORE: 9
DRESSING REGULAR LOWER BODY CLOTHING: TOTAL
EATING MEALS: TOTAL
HELP NEEDED FOR BATHING: TOTAL
CLIMB 3 TO 5 STEPS WITH RAILING: TOTAL
SUGGESTED CMS G CODE MODIFIER MOBILITY: CN
DRESSING REGULAR LOWER BODY CLOTHING: TOTAL
DRESSING REGULAR UPPER BODY CLOTHING: TOTAL
SUGGESTED CMS G CODE MODIFIER DAILY ACTIVITY: CN
WALKING IN HOSPITAL ROOM: TOTAL
TURNING FROM BACK TO SIDE WHILE IN FLAT BAD: TOTAL
WALKING IN HOSPITAL ROOM: TOTAL
DRESSING REGULAR UPPER BODY CLOTHING: A LOT
TOILETING: TOTAL
MOVING TO AND FROM BED TO CHAIR: TOTAL
PERSONAL GROOMING: A LOT
STANDING UP FROM CHAIR USING ARMS: TOTAL
MOVING FROM LYING ON BACK TO SITTING ON SIDE OF FLAT BED: TOTAL
TOILETING: TOTAL
STANDING UP FROM CHAIR USING ARMS: TOTAL
MOBILITY SCORE: 6
MOVING FROM LYING ON BACK TO SITTING ON SIDE OF FLAT BED: TOTAL
EATING MEALS: A LOT
DAILY ACTIVITIY SCORE: 6
HELP NEEDED FOR BATHING: TOTAL
CLIMB 3 TO 5 STEPS WITH RAILING: TOTAL
MOBILITY SCORE: 6
SUGGESTED CMS G CODE MODIFIER DAILY ACTIVITY: CL
SUGGESTED CMS G CODE MODIFIER MOBILITY: CN
TURNING FROM BACK TO SIDE WHILE IN FLAT BAD: TOTAL

## 2024-07-16 ASSESSMENT — SOCIAL DETERMINANTS OF HEALTH (SDOH)
WITHIN THE LAST YEAR, HAVE YOU BEEN AFRAID OF YOUR PARTNER OR EX-PARTNER?: NO
WITHIN THE PAST 12 MONTHS, THE FOOD YOU BOUGHT JUST DIDN'T LAST AND YOU DIDN'T HAVE MONEY TO GET MORE: NEVER TRUE
WITHIN THE LAST YEAR, HAVE TO BEEN RAPED OR FORCED TO HAVE ANY KIND OF SEXUAL ACTIVITY BY YOUR PARTNER OR EX-PARTNER?: NO
WITHIN THE LAST YEAR, HAVE YOU BEEN HUMILIATED OR EMOTIONALLY ABUSED IN OTHER WAYS BY YOUR PARTNER OR EX-PARTNER?: NO
WITHIN THE PAST 12 MONTHS, YOU WORRIED THAT YOUR FOOD WOULD RUN OUT BEFORE YOU GOT THE MONEY TO BUY MORE: NEVER TRUE
IN THE PAST 12 MONTHS, HAS THE ELECTRIC, GAS, OIL, OR WATER COMPANY THREATENED TO SHUT OFF SERVICE IN YOUR HOME?: NO
WITHIN THE LAST YEAR, HAVE YOU BEEN KICKED, HIT, SLAPPED, OR OTHERWISE PHYSICALLY HURT BY YOUR PARTNER OR EX-PARTNER?: NO

## 2024-07-16 ASSESSMENT — ENCOUNTER SYMPTOMS
MYALGIAS: 1
FALLS: 1

## 2024-07-16 ASSESSMENT — PAIN DESCRIPTION - PAIN TYPE
TYPE: ACUTE PAIN
TYPE: SURGICAL PAIN
TYPE: ACUTE PAIN

## 2024-07-16 ASSESSMENT — LIFESTYLE VARIABLES
ALCOHOL_USE: NO
EVER FELT BAD OR GUILTY ABOUT YOUR DRINKING: NO
ON A TYPICAL DAY WHEN YOU DRINK ALCOHOL HOW MANY DRINKS DO YOU HAVE: 1
TOTAL SCORE: 0
CONSUMPTION TOTAL: POSITIVE
TOTAL SCORE: 0
HAVE PEOPLE ANNOYED YOU BY CRITICIZING YOUR DRINKING: NO
TOTAL SCORE: 0
HAVE YOU EVER FELT YOU SHOULD CUT DOWN ON YOUR DRINKING: NO
AVERAGE NUMBER OF DAYS PER WEEK YOU HAVE A DRINK CONTAINING ALCOHOL: 1
EVER HAD A DRINK FIRST THING IN THE MORNING TO STEADY YOUR NERVES TO GET RID OF A HANGOVER: NO
DOES PATIENT WANT TO STOP DRINKING: NO
HOW MANY TIMES IN THE PAST YEAR HAVE YOU HAD 5 OR MORE DRINKS IN A DAY: 1

## 2024-07-16 ASSESSMENT — PATIENT HEALTH QUESTIONNAIRE - PHQ9
SUM OF ALL RESPONSES TO PHQ9 QUESTIONS 1 AND 2: 0
2. FEELING DOWN, DEPRESSED, IRRITABLE, OR HOPELESS: NOT AT ALL
1. LITTLE INTEREST OR PLEASURE IN DOING THINGS: NOT AT ALL

## 2024-07-16 ASSESSMENT — FIBROSIS 4 INDEX: FIB4 SCORE: 2.27

## 2024-07-17 PROBLEM — Z71.89 ADVANCE CARE PLANNING: Status: ACTIVE | Noted: 2024-07-17

## 2024-07-17 LAB
ANION GAP SERPL CALC-SCNC: 11 MMOL/L (ref 7–16)
BUN SERPL-MCNC: 25 MG/DL (ref 8–22)
CALCIUM SERPL-MCNC: 8.3 MG/DL (ref 8.5–10.5)
CHLORIDE SERPL-SCNC: 100 MMOL/L (ref 96–112)
CO2 SERPL-SCNC: 21 MMOL/L (ref 20–33)
CREAT SERPL-MCNC: 0.72 MG/DL (ref 0.5–1.4)
ERYTHROCYTE [DISTWIDTH] IN BLOOD BY AUTOMATED COUNT: 43.8 FL (ref 35.9–50)
GFR SERPLBLD CREATININE-BSD FMLA CKD-EPI: 84 ML/MIN/1.73 M 2
GLUCOSE SERPL-MCNC: 138 MG/DL (ref 65–99)
HCT VFR BLD AUTO: 31.8 % (ref 37–47)
HGB BLD-MCNC: 10.6 G/DL (ref 12–16)
MCH RBC QN AUTO: 31 PG (ref 27–33)
MCHC RBC AUTO-ENTMCNC: 33.3 G/DL (ref 32.2–35.5)
MCV RBC AUTO: 93 FL (ref 81.4–97.8)
PLATELET # BLD AUTO: 152 K/UL (ref 164–446)
PMV BLD AUTO: 11.2 FL (ref 9–12.9)
POTASSIUM SERPL-SCNC: 4.8 MMOL/L (ref 3.6–5.5)
RBC # BLD AUTO: 3.42 M/UL (ref 4.2–5.4)
SODIUM SERPL-SCNC: 132 MMOL/L (ref 135–145)
WBC # BLD AUTO: 9.1 K/UL (ref 4.8–10.8)

## 2024-07-17 PROCEDURE — 700111 HCHG RX REV CODE 636 W/ 250 OVERRIDE (IP): Mod: JZ | Performed by: INTERNAL MEDICINE

## 2024-07-17 PROCEDURE — 96372 THER/PROPH/DIAG INJ SC/IM: CPT

## 2024-07-17 PROCEDURE — 97530 THERAPEUTIC ACTIVITIES: CPT

## 2024-07-17 PROCEDURE — 36415 COLL VENOUS BLD VENIPUNCTURE: CPT

## 2024-07-17 PROCEDURE — 99233 SBSQ HOSP IP/OBS HIGH 50: CPT | Performed by: STUDENT IN AN ORGANIZED HEALTH CARE EDUCATION/TRAINING PROGRAM

## 2024-07-17 PROCEDURE — 97163 PT EVAL HIGH COMPLEX 45 MIN: CPT

## 2024-07-17 PROCEDURE — 51798 US URINE CAPACITY MEASURE: CPT

## 2024-07-17 PROCEDURE — A9270 NON-COVERED ITEM OR SERVICE: HCPCS | Performed by: INTERNAL MEDICINE

## 2024-07-17 PROCEDURE — 80048 BASIC METABOLIC PNL TOTAL CA: CPT

## 2024-07-17 PROCEDURE — 85027 COMPLETE CBC AUTOMATED: CPT

## 2024-07-17 PROCEDURE — 700102 HCHG RX REV CODE 250 W/ 637 OVERRIDE(OP): Performed by: INTERNAL MEDICINE

## 2024-07-17 PROCEDURE — G0378 HOSPITAL OBSERVATION PER HR: HCPCS

## 2024-07-17 RX ADMIN — ACETAMINOPHEN 1000 MG: 500 TABLET ORAL at 23:46

## 2024-07-17 RX ADMIN — SENNOSIDES AND DOCUSATE SODIUM 2 TABLET: 50; 8.6 TABLET ORAL at 17:02

## 2024-07-17 RX ADMIN — OXYCODONE HYDROCHLORIDE 10 MG: 10 TABLET ORAL at 19:29

## 2024-07-17 RX ADMIN — ENOXAPARIN SODIUM 40 MG: 100 INJECTION SUBCUTANEOUS at 17:02

## 2024-07-17 RX ADMIN — ACETAMINOPHEN 1000 MG: 500 TABLET ORAL at 04:21

## 2024-07-17 RX ADMIN — ACETAMINOPHEN 1000 MG: 500 TABLET ORAL at 12:18

## 2024-07-17 ASSESSMENT — PAIN DESCRIPTION - PAIN TYPE
TYPE: SURGICAL PAIN
TYPE: SURGICAL PAIN
TYPE: ACUTE PAIN

## 2024-07-17 ASSESSMENT — ENCOUNTER SYMPTOMS
MYALGIAS: 1
FALLS: 1

## 2024-07-17 ASSESSMENT — COGNITIVE AND FUNCTIONAL STATUS - GENERAL
MOVING FROM LYING ON BACK TO SITTING ON SIDE OF FLAT BED: A LOT
STANDING UP FROM CHAIR USING ARMS: A LOT
CLIMB 3 TO 5 STEPS WITH RAILING: TOTAL
SUGGESTED CMS G CODE MODIFIER MOBILITY: CL
WALKING IN HOSPITAL ROOM: TOTAL
TURNING FROM BACK TO SIDE WHILE IN FLAT BAD: A LOT
MOVING TO AND FROM BED TO CHAIR: A LOT
MOBILITY SCORE: 10

## 2024-07-17 ASSESSMENT — GAIT ASSESSMENTS: GAIT LEVEL OF ASSIST: UNABLE TO PARTICIPATE

## 2024-07-17 ASSESSMENT — FIBROSIS 4 INDEX: FIB4 SCORE: 2.84

## 2024-07-18 LAB
ANION GAP SERPL CALC-SCNC: 11 MMOL/L (ref 7–16)
BUN SERPL-MCNC: 16 MG/DL (ref 8–22)
CALCIUM SERPL-MCNC: 8.7 MG/DL (ref 8.5–10.5)
CHLORIDE SERPL-SCNC: 103 MMOL/L (ref 96–112)
CO2 SERPL-SCNC: 24 MMOL/L (ref 20–33)
CREAT SERPL-MCNC: 0.69 MG/DL (ref 0.5–1.4)
ERYTHROCYTE [DISTWIDTH] IN BLOOD BY AUTOMATED COUNT: 44.2 FL (ref 35.9–50)
GFR SERPLBLD CREATININE-BSD FMLA CKD-EPI: 87 ML/MIN/1.73 M 2
GLUCOSE SERPL-MCNC: 125 MG/DL (ref 65–99)
HCT VFR BLD AUTO: 30.4 % (ref 37–47)
HGB BLD-MCNC: 9.9 G/DL (ref 12–16)
MCH RBC QN AUTO: 30.3 PG (ref 27–33)
MCHC RBC AUTO-ENTMCNC: 32.6 G/DL (ref 32.2–35.5)
MCV RBC AUTO: 93 FL (ref 81.4–97.8)
PLATELET # BLD AUTO: 169 K/UL (ref 164–446)
PMV BLD AUTO: 10.9 FL (ref 9–12.9)
POTASSIUM SERPL-SCNC: 4.2 MMOL/L (ref 3.6–5.5)
RBC # BLD AUTO: 3.27 M/UL (ref 4.2–5.4)
SODIUM SERPL-SCNC: 138 MMOL/L (ref 135–145)
WBC # BLD AUTO: 7.6 K/UL (ref 4.8–10.8)

## 2024-07-18 PROCEDURE — G0378 HOSPITAL OBSERVATION PER HR: HCPCS

## 2024-07-18 PROCEDURE — 97530 THERAPEUTIC ACTIVITIES: CPT

## 2024-07-18 PROCEDURE — A9270 NON-COVERED ITEM OR SERVICE: HCPCS | Performed by: INTERNAL MEDICINE

## 2024-07-18 PROCEDURE — 96372 THER/PROPH/DIAG INJ SC/IM: CPT

## 2024-07-18 PROCEDURE — 36415 COLL VENOUS BLD VENIPUNCTURE: CPT

## 2024-07-18 PROCEDURE — 80048 BASIC METABOLIC PNL TOTAL CA: CPT

## 2024-07-18 PROCEDURE — 700111 HCHG RX REV CODE 636 W/ 250 OVERRIDE (IP): Mod: JZ | Performed by: INTERNAL MEDICINE

## 2024-07-18 PROCEDURE — 700102 HCHG RX REV CODE 250 W/ 637 OVERRIDE(OP): Performed by: INTERNAL MEDICINE

## 2024-07-18 PROCEDURE — 97112 NEUROMUSCULAR REEDUCATION: CPT

## 2024-07-18 PROCEDURE — 97166 OT EVAL MOD COMPLEX 45 MIN: CPT

## 2024-07-18 PROCEDURE — 99232 SBSQ HOSP IP/OBS MODERATE 35: CPT | Performed by: STUDENT IN AN ORGANIZED HEALTH CARE EDUCATION/TRAINING PROGRAM

## 2024-07-18 PROCEDURE — 85027 COMPLETE CBC AUTOMATED: CPT

## 2024-07-18 PROCEDURE — 97535 SELF CARE MNGMENT TRAINING: CPT

## 2024-07-18 RX ADMIN — OXYCODONE 5 MG: 5 TABLET ORAL at 09:13

## 2024-07-18 RX ADMIN — ACETAMINOPHEN 1000 MG: 500 TABLET ORAL at 05:43

## 2024-07-18 RX ADMIN — ACETAMINOPHEN 1000 MG: 500 TABLET ORAL at 21:04

## 2024-07-18 RX ADMIN — ENOXAPARIN SODIUM 40 MG: 100 INJECTION SUBCUTANEOUS at 16:52

## 2024-07-18 RX ADMIN — TELMISARTAN 40 MG: 40 TABLET ORAL at 16:51

## 2024-07-18 RX ADMIN — ACETAMINOPHEN 1000 MG: 500 TABLET ORAL at 13:53

## 2024-07-18 RX ADMIN — SENNOSIDES AND DOCUSATE SODIUM 2 TABLET: 50; 8.6 TABLET ORAL at 16:51

## 2024-07-18 ASSESSMENT — COGNITIVE AND FUNCTIONAL STATUS - GENERAL
WALKING IN HOSPITAL ROOM: A LOT
SUGGESTED CMS G CODE MODIFIER DAILY ACTIVITY: CK
STANDING UP FROM CHAIR USING ARMS: A LOT
TURNING FROM BACK TO SIDE WHILE IN FLAT BAD: A LOT
DAILY ACTIVITIY SCORE: 14
MOVING TO AND FROM BED TO CHAIR: A LOT
PERSONAL GROOMING: A LITTLE
CLIMB 3 TO 5 STEPS WITH RAILING: A LOT
DRESSING REGULAR UPPER BODY CLOTHING: A LOT
MOVING FROM LYING ON BACK TO SITTING ON SIDE OF FLAT BED: A LOT
TOILETING: A LOT
MOBILITY SCORE: 12
EATING MEALS: A LITTLE
DRESSING REGULAR LOWER BODY CLOTHING: A LOT
HELP NEEDED FOR BATHING: A LOT
SUGGESTED CMS G CODE MODIFIER MOBILITY: CL

## 2024-07-18 ASSESSMENT — GAIT ASSESSMENTS
DISTANCE (FEET): 2
ASSISTIVE DEVICE: QUAD CANE
GAIT LEVEL OF ASSIST: MODERATE ASSIST
DEVIATION: ANTALGIC;BRADYKINETIC;SHUFFLED GAIT

## 2024-07-18 ASSESSMENT — PAIN SCALES - GENERAL: PAIN_LEVEL: 2

## 2024-07-18 ASSESSMENT — PAIN DESCRIPTION - PAIN TYPE
TYPE: ACUTE PAIN
TYPE: SURGICAL PAIN
TYPE: ACUTE PAIN
TYPE: SURGICAL PAIN
TYPE: SURGICAL PAIN

## 2024-07-18 ASSESSMENT — ENCOUNTER SYMPTOMS
FALLS: 1
MYALGIAS: 1

## 2024-07-18 ASSESSMENT — ACTIVITIES OF DAILY LIVING (ADL): TOILETING: INDEPENDENT

## 2024-07-19 PROCEDURE — 700102 HCHG RX REV CODE 250 W/ 637 OVERRIDE(OP): Performed by: INTERNAL MEDICINE

## 2024-07-19 PROCEDURE — 97530 THERAPEUTIC ACTIVITIES: CPT

## 2024-07-19 PROCEDURE — 99223 1ST HOSP IP/OBS HIGH 75: CPT | Performed by: PHYSICAL MEDICINE & REHABILITATION

## 2024-07-19 PROCEDURE — 99232 SBSQ HOSP IP/OBS MODERATE 35: CPT | Performed by: STUDENT IN AN ORGANIZED HEALTH CARE EDUCATION/TRAINING PROGRAM

## 2024-07-19 PROCEDURE — 96372 THER/PROPH/DIAG INJ SC/IM: CPT

## 2024-07-19 PROCEDURE — A9270 NON-COVERED ITEM OR SERVICE: HCPCS | Performed by: INTERNAL MEDICINE

## 2024-07-19 PROCEDURE — 700111 HCHG RX REV CODE 636 W/ 250 OVERRIDE (IP): Mod: JZ | Performed by: INTERNAL MEDICINE

## 2024-07-19 PROCEDURE — G0378 HOSPITAL OBSERVATION PER HR: HCPCS

## 2024-07-19 RX ADMIN — SENNOSIDES AND DOCUSATE SODIUM 2 TABLET: 50; 8.6 TABLET ORAL at 16:54

## 2024-07-19 RX ADMIN — ACETAMINOPHEN 1000 MG: 500 TABLET ORAL at 14:26

## 2024-07-19 RX ADMIN — OXYCODONE 5 MG: 5 TABLET ORAL at 21:20

## 2024-07-19 RX ADMIN — ACETAMINOPHEN 1000 MG: 500 TABLET ORAL at 21:21

## 2024-07-19 RX ADMIN — ACETAMINOPHEN 1000 MG: 500 TABLET ORAL at 04:51

## 2024-07-19 RX ADMIN — OXYCODONE HYDROCHLORIDE 10 MG: 10 TABLET ORAL at 04:51

## 2024-07-19 RX ADMIN — POLYETHYLENE GLYCOL 3350 1 PACKET: 17 POWDER, FOR SOLUTION ORAL at 04:54

## 2024-07-19 RX ADMIN — ENOXAPARIN SODIUM 40 MG: 100 INJECTION SUBCUTANEOUS at 16:54

## 2024-07-19 RX ADMIN — TELMISARTAN 40 MG: 40 TABLET ORAL at 16:54

## 2024-07-19 ASSESSMENT — ENCOUNTER SYMPTOMS
MYALGIAS: 1
FALLS: 1

## 2024-07-19 ASSESSMENT — PAIN DESCRIPTION - PAIN TYPE
TYPE: ACUTE PAIN
TYPE: SURGICAL PAIN;ACUTE PAIN
TYPE: ACUTE PAIN

## 2024-07-19 ASSESSMENT — GAIT ASSESSMENTS
GAIT LEVEL OF ASSIST: MODERATE ASSIST
DISTANCE (FEET): 2
ASSISTIVE DEVICE: HEMI-WALKER

## 2024-07-19 ASSESSMENT — COGNITIVE AND FUNCTIONAL STATUS - GENERAL
CLIMB 3 TO 5 STEPS WITH RAILING: TOTAL
SUGGESTED CMS G CODE MODIFIER MOBILITY: CL
MOVING TO AND FROM BED TO CHAIR: A LOT
MOBILITY SCORE: 11
STANDING UP FROM CHAIR USING ARMS: A LOT
WALKING IN HOSPITAL ROOM: A LOT
TURNING FROM BACK TO SIDE WHILE IN FLAT BAD: A LOT
MOVING FROM LYING ON BACK TO SITTING ON SIDE OF FLAT BED: A LOT

## 2024-07-20 PROCEDURE — 700111 HCHG RX REV CODE 636 W/ 250 OVERRIDE (IP): Mod: JZ | Performed by: INTERNAL MEDICINE

## 2024-07-20 PROCEDURE — G0378 HOSPITAL OBSERVATION PER HR: HCPCS

## 2024-07-20 PROCEDURE — 700102 HCHG RX REV CODE 250 W/ 637 OVERRIDE(OP): Performed by: INTERNAL MEDICINE

## 2024-07-20 PROCEDURE — A9270 NON-COVERED ITEM OR SERVICE: HCPCS | Performed by: INTERNAL MEDICINE

## 2024-07-20 PROCEDURE — 99232 SBSQ HOSP IP/OBS MODERATE 35: CPT | Performed by: STUDENT IN AN ORGANIZED HEALTH CARE EDUCATION/TRAINING PROGRAM

## 2024-07-20 RX ORDER — ACETAMINOPHEN 325 MG/1
650 TABLET ORAL EVERY 6 HOURS PRN
Status: DISCONTINUED | OUTPATIENT
Start: 2024-07-20 | End: 2024-07-21

## 2024-07-20 RX ADMIN — TELMISARTAN 40 MG: 40 TABLET ORAL at 16:22

## 2024-07-20 RX ADMIN — ENOXAPARIN SODIUM 40 MG: 100 INJECTION SUBCUTANEOUS at 16:23

## 2024-07-20 RX ADMIN — ACETAMINOPHEN 1000 MG: 500 TABLET ORAL at 04:39

## 2024-07-20 RX ADMIN — ACETAMINOPHEN 1000 MG: 500 TABLET ORAL at 13:13

## 2024-07-20 RX ADMIN — OXYCODONE 5 MG: 5 TABLET ORAL at 20:38

## 2024-07-20 ASSESSMENT — PAIN DESCRIPTION - PAIN TYPE
TYPE: ACUTE PAIN
TYPE: ACUTE PAIN;SURGICAL PAIN

## 2024-07-20 ASSESSMENT — ENCOUNTER SYMPTOMS
MYALGIAS: 1
FALLS: 1

## 2024-07-21 LAB
ANION GAP SERPL CALC-SCNC: 12 MMOL/L (ref 7–16)
BUN SERPL-MCNC: 20 MG/DL (ref 8–22)
CALCIUM SERPL-MCNC: 9 MG/DL (ref 8.5–10.5)
CHLORIDE SERPL-SCNC: 104 MMOL/L (ref 96–112)
CO2 SERPL-SCNC: 22 MMOL/L (ref 20–33)
CREAT SERPL-MCNC: 0.65 MG/DL (ref 0.5–1.4)
ERYTHROCYTE [DISTWIDTH] IN BLOOD BY AUTOMATED COUNT: 43.8 FL (ref 35.9–50)
GFR SERPLBLD CREATININE-BSD FMLA CKD-EPI: 88 ML/MIN/1.73 M 2
GLUCOSE SERPL-MCNC: 114 MG/DL (ref 65–99)
HCT VFR BLD AUTO: 28.8 % (ref 37–47)
HGB BLD-MCNC: 9.6 G/DL (ref 12–16)
MCH RBC QN AUTO: 30.7 PG (ref 27–33)
MCHC RBC AUTO-ENTMCNC: 33.3 G/DL (ref 32.2–35.5)
MCV RBC AUTO: 92 FL (ref 81.4–97.8)
PLATELET # BLD AUTO: 175 K/UL (ref 164–446)
PMV BLD AUTO: 10.5 FL (ref 9–12.9)
POTASSIUM SERPL-SCNC: 4.1 MMOL/L (ref 3.6–5.5)
RBC # BLD AUTO: 3.13 M/UL (ref 4.2–5.4)
SODIUM SERPL-SCNC: 138 MMOL/L (ref 135–145)
WBC # BLD AUTO: 6.8 K/UL (ref 4.8–10.8)

## 2024-07-21 PROCEDURE — 700102 HCHG RX REV CODE 250 W/ 637 OVERRIDE(OP): Performed by: INTERNAL MEDICINE

## 2024-07-21 PROCEDURE — A9270 NON-COVERED ITEM OR SERVICE: HCPCS | Performed by: INTERNAL MEDICINE

## 2024-07-21 PROCEDURE — 303554 HCHG ELASTIC BANDAGE 6IN

## 2024-07-21 PROCEDURE — 700102 HCHG RX REV CODE 250 W/ 637 OVERRIDE(OP): Performed by: EMERGENCY MEDICAL TECHNICIAN, INTERMEDIATE

## 2024-07-21 PROCEDURE — 700111 HCHG RX REV CODE 636 W/ 250 OVERRIDE (IP): Mod: JZ | Performed by: INTERNAL MEDICINE

## 2024-07-21 PROCEDURE — 80048 BASIC METABOLIC PNL TOTAL CA: CPT

## 2024-07-21 PROCEDURE — A9270 NON-COVERED ITEM OR SERVICE: HCPCS | Performed by: EMERGENCY MEDICAL TECHNICIAN, INTERMEDIATE

## 2024-07-21 PROCEDURE — 36415 COLL VENOUS BLD VENIPUNCTURE: CPT

## 2024-07-21 PROCEDURE — G0378 HOSPITAL OBSERVATION PER HR: HCPCS

## 2024-07-21 PROCEDURE — 99232 SBSQ HOSP IP/OBS MODERATE 35: CPT | Performed by: STUDENT IN AN ORGANIZED HEALTH CARE EDUCATION/TRAINING PROGRAM

## 2024-07-21 PROCEDURE — 85027 COMPLETE CBC AUTOMATED: CPT

## 2024-07-21 RX ORDER — OXYCODONE HYDROCHLORIDE 5 MG/1
2.5 TABLET ORAL
Status: DISCONTINUED | OUTPATIENT
Start: 2024-07-21 | End: 2024-07-23 | Stop reason: HOSPADM

## 2024-07-21 RX ORDER — OXYCODONE HYDROCHLORIDE 5 MG/1
5 TABLET ORAL
Status: DISCONTINUED | OUTPATIENT
Start: 2024-07-21 | End: 2024-07-23 | Stop reason: HOSPADM

## 2024-07-21 RX ORDER — CELECOXIB 100 MG/1
100 CAPSULE ORAL DAILY
Status: DISCONTINUED | OUTPATIENT
Start: 2024-07-21 | End: 2024-07-22

## 2024-07-21 RX ORDER — ACETAMINOPHEN 500 MG
1000 TABLET ORAL EVERY 6 HOURS PRN
Status: DISCONTINUED | OUTPATIENT
Start: 2024-07-21 | End: 2024-07-23 | Stop reason: HOSPADM

## 2024-07-21 RX ORDER — METHOCARBAMOL 500 MG/1
500 TABLET, FILM COATED ORAL 4 TIMES DAILY
Status: DISCONTINUED | OUTPATIENT
Start: 2024-07-21 | End: 2024-07-22

## 2024-07-21 RX ADMIN — METHOCARBAMOL 500 MG: 500 TABLET ORAL at 20:23

## 2024-07-21 RX ADMIN — TELMISARTAN 40 MG: 40 TABLET ORAL at 17:45

## 2024-07-21 RX ADMIN — CELECOXIB 100 MG: 100 CAPSULE ORAL at 13:01

## 2024-07-21 RX ADMIN — METHOCARBAMOL 500 MG: 500 TABLET ORAL at 13:01

## 2024-07-21 RX ADMIN — ENOXAPARIN SODIUM 40 MG: 100 INJECTION SUBCUTANEOUS at 17:45

## 2024-07-21 RX ADMIN — SENNOSIDES AND DOCUSATE SODIUM 2 TABLET: 50; 8.6 TABLET ORAL at 17:45

## 2024-07-21 RX ADMIN — METHOCARBAMOL 500 MG: 500 TABLET ORAL at 17:45

## 2024-07-21 ASSESSMENT — PAIN DESCRIPTION - PAIN TYPE
TYPE: ACUTE PAIN;SURGICAL PAIN
TYPE: ACUTE PAIN;SURGICAL PAIN
TYPE: ACUTE PAIN

## 2024-07-21 ASSESSMENT — ENCOUNTER SYMPTOMS
MYALGIAS: 1
FALLS: 1

## 2024-07-22 LAB
FERRITIN SERPL-MCNC: 217 NG/ML (ref 10–291)
IRON SATN MFR SERPL: 20 % (ref 15–55)
IRON SERPL-MCNC: 49 UG/DL (ref 40–170)
TIBC SERPL-MCNC: 247 UG/DL (ref 250–450)
UIBC SERPL-MCNC: 198 UG/DL (ref 110–370)

## 2024-07-22 PROCEDURE — 83550 IRON BINDING TEST: CPT

## 2024-07-22 PROCEDURE — 36415 COLL VENOUS BLD VENIPUNCTURE: CPT

## 2024-07-22 PROCEDURE — 99232 SBSQ HOSP IP/OBS MODERATE 35: CPT | Performed by: STUDENT IN AN ORGANIZED HEALTH CARE EDUCATION/TRAINING PROGRAM

## 2024-07-22 PROCEDURE — 700111 HCHG RX REV CODE 636 W/ 250 OVERRIDE (IP): Mod: JZ | Performed by: INTERNAL MEDICINE

## 2024-07-22 PROCEDURE — A9270 NON-COVERED ITEM OR SERVICE: HCPCS | Performed by: EMERGENCY MEDICAL TECHNICIAN, INTERMEDIATE

## 2024-07-22 PROCEDURE — A9270 NON-COVERED ITEM OR SERVICE: HCPCS | Performed by: INTERNAL MEDICINE

## 2024-07-22 PROCEDURE — 82728 ASSAY OF FERRITIN: CPT

## 2024-07-22 PROCEDURE — 700102 HCHG RX REV CODE 250 W/ 637 OVERRIDE(OP): Performed by: INTERNAL MEDICINE

## 2024-07-22 PROCEDURE — 700102 HCHG RX REV CODE 250 W/ 637 OVERRIDE(OP): Performed by: PHYSICAL MEDICINE & REHABILITATION

## 2024-07-22 PROCEDURE — 83540 ASSAY OF IRON: CPT

## 2024-07-22 PROCEDURE — 700102 HCHG RX REV CODE 250 W/ 637 OVERRIDE(OP): Performed by: EMERGENCY MEDICAL TECHNICIAN, INTERMEDIATE

## 2024-07-22 PROCEDURE — A9270 NON-COVERED ITEM OR SERVICE: HCPCS | Performed by: PHYSICAL MEDICINE & REHABILITATION

## 2024-07-22 PROCEDURE — G0378 HOSPITAL OBSERVATION PER HR: HCPCS

## 2024-07-22 PROCEDURE — 99233 SBSQ HOSP IP/OBS HIGH 50: CPT | Performed by: PHYSICAL MEDICINE & REHABILITATION

## 2024-07-22 RX ORDER — GABAPENTIN 100 MG/1
100 CAPSULE ORAL 3 TIMES DAILY
Status: DISCONTINUED | OUTPATIENT
Start: 2024-07-22 | End: 2024-07-23 | Stop reason: HOSPADM

## 2024-07-22 RX ORDER — METHOCARBAMOL 500 MG/1
500 TABLET, FILM COATED ORAL 4 TIMES DAILY PRN
Status: DISCONTINUED | OUTPATIENT
Start: 2024-07-22 | End: 2024-07-23 | Stop reason: HOSPADM

## 2024-07-22 RX ORDER — METHOCARBAMOL 500 MG/1
500 TABLET, FILM COATED ORAL 4 TIMES DAILY PRN
Status: ON HOLD
Start: 2024-07-22

## 2024-07-22 RX ADMIN — GABAPENTIN 100 MG: 100 CAPSULE ORAL at 17:10

## 2024-07-22 RX ADMIN — TELMISARTAN 40 MG: 40 TABLET ORAL at 17:11

## 2024-07-22 RX ADMIN — SENNOSIDES AND DOCUSATE SODIUM 2 TABLET: 50; 8.6 TABLET ORAL at 17:10

## 2024-07-22 RX ADMIN — CELECOXIB 100 MG: 100 CAPSULE ORAL at 04:53

## 2024-07-22 RX ADMIN — OXYCODONE 5 MG: 5 TABLET ORAL at 02:50

## 2024-07-22 RX ADMIN — METHOCARBAMOL 500 MG: 500 TABLET ORAL at 09:05

## 2024-07-22 RX ADMIN — OXYCODONE 5 MG: 5 TABLET ORAL at 07:40

## 2024-07-22 RX ADMIN — ENOXAPARIN SODIUM 40 MG: 100 INJECTION SUBCUTANEOUS at 17:11

## 2024-07-22 ASSESSMENT — ENCOUNTER SYMPTOMS
MYALGIAS: 1
FALLS: 1

## 2024-07-22 ASSESSMENT — PAIN DESCRIPTION - PAIN TYPE: TYPE: ACUTE PAIN;SURGICAL PAIN

## 2024-07-23 ENCOUNTER — HOSPITAL ENCOUNTER (INPATIENT)
Facility: REHABILITATION | Age: 79
DRG: 560 | End: 2024-07-23
Attending: PHYSICAL MEDICINE & REHABILITATION | Admitting: PHYSICAL MEDICINE & REHABILITATION
Payer: COMMERCIAL

## 2024-07-23 VITALS
SYSTOLIC BLOOD PRESSURE: 119 MMHG | RESPIRATION RATE: 15 BRPM | TEMPERATURE: 97.7 F | HEIGHT: 63 IN | OXYGEN SATURATION: 95 % | HEART RATE: 75 BPM | BODY MASS INDEX: 29.53 KG/M2 | DIASTOLIC BLOOD PRESSURE: 53 MMHG | WEIGHT: 166.67 LBS

## 2024-07-23 DIAGNOSIS — R42 ORTHOSTATIC DIZZINESS: ICD-10-CM

## 2024-07-23 DIAGNOSIS — T07.XXXA MULTIPLE TRAUMA: ICD-10-CM

## 2024-07-23 DIAGNOSIS — S82.102A CLOSED FRACTURE OF PROXIMAL END OF LEFT TIBIA, UNSPECIFIED FRACTURE MORPHOLOGY, INITIAL ENCOUNTER: ICD-10-CM

## 2024-07-23 PROCEDURE — 94760 N-INVAS EAR/PLS OXIMETRY 1: CPT

## 2024-07-23 PROCEDURE — 770010 HCHG ROOM/CARE - REHAB SEMI PRIVAT*

## 2024-07-23 PROCEDURE — 700102 HCHG RX REV CODE 250 W/ 637 OVERRIDE(OP): Performed by: EMERGENCY MEDICAL TECHNICIAN, INTERMEDIATE

## 2024-07-23 PROCEDURE — 700102 HCHG RX REV CODE 250 W/ 637 OVERRIDE(OP): Performed by: PHYSICAL MEDICINE & REHABILITATION

## 2024-07-23 PROCEDURE — A9270 NON-COVERED ITEM OR SERVICE: HCPCS | Performed by: PHYSICAL MEDICINE & REHABILITATION

## 2024-07-23 PROCEDURE — 99223 1ST HOSP IP/OBS HIGH 75: CPT | Performed by: PHYSICAL MEDICINE & REHABILITATION

## 2024-07-23 PROCEDURE — A9270 NON-COVERED ITEM OR SERVICE: HCPCS | Performed by: EMERGENCY MEDICAL TECHNICIAN, INTERMEDIATE

## 2024-07-23 PROCEDURE — 700111 HCHG RX REV CODE 636 W/ 250 OVERRIDE (IP): Mod: JZ | Performed by: PHYSICAL MEDICINE & REHABILITATION

## 2024-07-23 PROCEDURE — G0378 HOSPITAL OBSERVATION PER HR: HCPCS

## 2024-07-23 PROCEDURE — 99239 HOSP IP/OBS DSCHRG MGMT >30: CPT | Performed by: HOSPITALIST

## 2024-07-23 RX ORDER — METHOCARBAMOL 500 MG/1
500 TABLET, FILM COATED ORAL 4 TIMES DAILY PRN
Status: ON HOLD
Start: 2024-07-23

## 2024-07-23 RX ORDER — OXYCODONE HYDROCHLORIDE 5 MG/1
5 TABLET ORAL
Status: DISCONTINUED | OUTPATIENT
Start: 2024-07-23 | End: 2024-08-07 | Stop reason: HOSPADM

## 2024-07-23 RX ORDER — ENOXAPARIN SODIUM 100 MG/ML
40 INJECTION SUBCUTANEOUS DAILY
Status: CANCELLED | OUTPATIENT
Start: 2024-07-23

## 2024-07-23 RX ORDER — GABAPENTIN 100 MG/1
100 CAPSULE ORAL 3 TIMES DAILY
Status: DISCONTINUED | OUTPATIENT
Start: 2024-07-23 | End: 2024-07-24

## 2024-07-23 RX ORDER — ONDANSETRON 4 MG/1
4 TABLET, ORALLY DISINTEGRATING ORAL 4 TIMES DAILY PRN
Status: DISCONTINUED | OUTPATIENT
Start: 2024-07-23 | End: 2024-08-07 | Stop reason: HOSPADM

## 2024-07-23 RX ORDER — GABAPENTIN 100 MG/1
100 CAPSULE ORAL 3 TIMES DAILY
Status: CANCELLED | OUTPATIENT
Start: 2024-07-23

## 2024-07-23 RX ORDER — ECHINACEA PURPUREA EXTRACT 125 MG
2 TABLET ORAL PRN
Status: DISCONTINUED | OUTPATIENT
Start: 2024-07-23 | End: 2024-08-07 | Stop reason: HOSPADM

## 2024-07-23 RX ORDER — OXYCODONE HYDROCHLORIDE 5 MG/1
2.5 TABLET ORAL
Status: DISCONTINUED | OUTPATIENT
Start: 2024-07-23 | End: 2024-08-07 | Stop reason: HOSPADM

## 2024-07-23 RX ORDER — ENOXAPARIN SODIUM 100 MG/ML
40 INJECTION SUBCUTANEOUS DAILY
Status: DISCONTINUED | OUTPATIENT
Start: 2024-07-23 | End: 2024-08-07 | Stop reason: HOSPADM

## 2024-07-23 RX ORDER — GABAPENTIN 100 MG/1
100 CAPSULE ORAL 3 TIMES DAILY
Status: ON HOLD
Start: 2024-07-23

## 2024-07-23 RX ORDER — OXYCODONE HYDROCHLORIDE 5 MG/1
5 TABLET ORAL
Status: CANCELLED | OUTPATIENT
Start: 2024-07-23

## 2024-07-23 RX ORDER — AMOXICILLIN 250 MG
2 CAPSULE ORAL 2 TIMES DAILY
Status: DISCONTINUED | OUTPATIENT
Start: 2024-07-23 | End: 2024-08-07 | Stop reason: HOSPADM

## 2024-07-23 RX ORDER — ONDANSETRON 2 MG/ML
4 INJECTION INTRAMUSCULAR; INTRAVENOUS 4 TIMES DAILY PRN
Status: DISCONTINUED | OUTPATIENT
Start: 2024-07-23 | End: 2024-08-07 | Stop reason: HOSPADM

## 2024-07-23 RX ORDER — ACETAMINOPHEN 500 MG
1000 TABLET ORAL EVERY 6 HOURS PRN
Status: CANCELLED | OUTPATIENT
Start: 2024-07-23

## 2024-07-23 RX ORDER — TELMISARTAN 40 MG/1
40 TABLET ORAL EVERY EVENING
Status: CANCELLED | OUTPATIENT
Start: 2024-07-23

## 2024-07-23 RX ORDER — HYDRALAZINE HYDROCHLORIDE 25 MG/1
25 TABLET, FILM COATED ORAL EVERY 8 HOURS PRN
Status: DISCONTINUED | OUTPATIENT
Start: 2024-07-23 | End: 2024-08-07 | Stop reason: HOSPADM

## 2024-07-23 RX ORDER — OXYCODONE HYDROCHLORIDE 5 MG/1
2.5 TABLET ORAL
Status: CANCELLED | OUTPATIENT
Start: 2024-07-23

## 2024-07-23 RX ORDER — ACETAMINOPHEN 500 MG
1000 TABLET ORAL EVERY 6 HOURS PRN
Status: DISCONTINUED | OUTPATIENT
Start: 2024-07-23 | End: 2024-07-24

## 2024-07-23 RX ORDER — POLYETHYLENE GLYCOL 3350 17 G/17G
1 POWDER, FOR SOLUTION ORAL
Status: DISCONTINUED | OUTPATIENT
Start: 2024-07-23 | End: 2024-08-07 | Stop reason: HOSPADM

## 2024-07-23 RX ORDER — TELMISARTAN 40 MG/1
40 TABLET ORAL EVERY EVENING
Status: DISCONTINUED | OUTPATIENT
Start: 2024-07-23 | End: 2024-07-26

## 2024-07-23 RX ADMIN — GABAPENTIN 100 MG: 100 CAPSULE ORAL at 05:39

## 2024-07-23 RX ADMIN — GABAPENTIN 100 MG: 100 CAPSULE ORAL at 20:36

## 2024-07-23 RX ADMIN — ENOXAPARIN SODIUM 40 MG: 100 INJECTION SUBCUTANEOUS at 17:44

## 2024-07-23 RX ADMIN — TELMISARTAN 40 MG: 40 TABLET ORAL at 20:33

## 2024-07-23 RX ADMIN — GABAPENTIN 100 MG: 100 CAPSULE ORAL at 17:44

## 2024-07-23 RX ADMIN — OXYCODONE 5 MG: 5 TABLET ORAL at 14:42

## 2024-07-23 ASSESSMENT — PAIN DESCRIPTION - PAIN TYPE
TYPE: ACUTE PAIN
TYPE: ACUTE PAIN
TYPE: ACUTE PAIN;SURGICAL PAIN

## 2024-07-23 ASSESSMENT — LIFESTYLE VARIABLES
TOTAL SCORE: 0
HOW MANY TIMES IN THE PAST YEAR HAVE YOU HAD 5 OR MORE DRINKS IN A DAY: 0
EVER FELT BAD OR GUILTY ABOUT YOUR DRINKING: NO
DOES PATIENT WANT TO STOP DRINKING: NO
TOTAL SCORE: 0
TOTAL SCORE: 0
ALCOHOL_USE: YES
HAVE PEOPLE ANNOYED YOU BY CRITICIZING YOUR DRINKING: NO
EVER HAD A DRINK FIRST THING IN THE MORNING TO STEADY YOUR NERVES TO GET RID OF A HANGOVER: NO
AVERAGE NUMBER OF DAYS PER WEEK YOU HAVE A DRINK CONTAINING ALCOHOL: 5
CONSUMPTION TOTAL: NEGATIVE
HAVE YOU EVER FELT YOU SHOULD CUT DOWN ON YOUR DRINKING: NO
ON A TYPICAL DAY WHEN YOU DRINK ALCOHOL HOW MANY DRINKS DO YOU HAVE: 1

## 2024-07-23 ASSESSMENT — FIBROSIS 4 INDEX: FIB4 SCORE: 2.47

## 2024-07-23 ASSESSMENT — PATIENT HEALTH QUESTIONNAIRE - PHQ9
1. LITTLE INTEREST OR PLEASURE IN DOING THINGS: NOT AT ALL
SUM OF ALL RESPONSES TO PHQ9 QUESTIONS 1 AND 2: 0
SUM OF ALL RESPONSES TO PHQ9 QUESTIONS 1 AND 2: 0
2. FEELING DOWN, DEPRESSED, IRRITABLE, OR HOPELESS: NOT AT ALL
SUM OF ALL RESPONSES TO PHQ9 QUESTIONS 1 AND 2: 0
2. FEELING DOWN, DEPRESSED, IRRITABLE, OR HOPELESS: NOT AT ALL
1. LITTLE INTEREST OR PLEASURE IN DOING THINGS: NOT AT ALL
2. FEELING DOWN, DEPRESSED, IRRITABLE, OR HOPELESS: NOT AT ALL
1. LITTLE INTEREST OR PLEASURE IN DOING THINGS: NOT AT ALL

## 2024-07-23 NOTE — PROGRESS NOTES
Admit to [facility] from [facility] via [facility] at approx 1540. Dr. Hays to follow for diagnosis of Multiple trauma.  present. Initial assessment started. O[facility] to [facility] process, call light, falls video veiwed by pt and her .

## 2024-07-23 NOTE — PROGRESS NOTES
4 Eyes Skin Assessment Completed by DANIEL Jacques and DANIEL Rodríguez.    Head WDL  Ears WDL  Nose WDL  Mouth WDL  Neck WDL  Breast/Chest WDL  Shoulder Blades WDL  Spine WDL  (R) Arm/Elbow/Hand Bruising and Edema in sling  (L) Arm/Elbow/Hand Bruising  Abdomen WDL  Groin WDL  Scrotum/Coccyx/Buttocks Redness, Blanching, Non-Blanching, Excoriation, Moisture Fissure, and Discoloration  (R) Leg WDL  (L) Leg Bruising, Edema, and Incision  (R) Heel/Foot/Toe WDL  (L) Heel/Foot/Toe Bruising and Edema and incision          Devices In Places Blood Pressure Cuff      Interventions In Place Waffle Overlay, Pillows, and Heels Loaded W/Pillows    Possible Skin Injury Yes    Pictures Uploaded Into Epic Yes  Wound Consult Placed Yes  RN Wound Prevention Protocol Ordered Yes

## 2024-07-23 NOTE — H&P
Physical Medicine & Rehabilitation  History and Physical (H&P)  &     Post Admission Physician Evaluation ([place])       Date of Admission: 7/23/24  Date of Service: 7/23/2024   Gali Broderick      [facility] Code to Support Admission: 0008.4 - Orthopaedic Disorders: Status Post Major Multiple Fractures  Etiologic Diagnosis: Multiple trauma    _______________________________________________    Chief Complaint: Weakness    History of Present Illness:  Adapted from the PM&R Consult by Dr. Jackson:   The patient is a 79 y.o. right hand dominant female with a past medical history of HTN, bilateral knee pain, aortic root dilation, moderate aortic insufficiency, DLD, CKD;  who presented on 7/15/2024  2:33 PM with right shoulder and left knee pain after mechanical GLF while hiking. She was seen by orthopedics and found to have a left tibial shaft fracture and right threepart proximal humerus fracture. She was taken to the OR by Dr. Ramírez Denney MD on 7/16 for ORIF left tibal shaft with IM nail and closed treatment of right humerus fracture. Postoperatively she is WBAT LLE and NWB with sling for RUE. Current labs reflect anemia with hgb 9.9     Patient has continued anemia which may be related to her left leg pain and swelling, there is concern for an intramuscular hematoma vs seroma. This would not be operative but would require daily labs for observation. She is also have orthostatic hypotension related to her anemia, she reports feeling hot, nauseas and light headed with standing. She had emesis once. 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     Today she is happy to be here and ready for rehab. Pain controlled.       Review of Systems:     Comprehensive 14 point ROS was reviewed and all were negative except as noted elsewhere in this document.     Past Medical History:  Past Medical History:   Diagnosis Date    Allergy     Chickenpox     Hypertension     Measles     Mumps        Past Surgical  History:  Past Surgical History:   Procedure Laterality Date    TIBIA NAILING INTRAMEDULLARY Left 7/16/2024    Procedure: INSERTION, INTRAMEDULLARY EVANGELINA, TIBIA;  Surgeon: Ramírez Denney M.D.;  Location: SURGERY [facility];  Service: Orthopedics    ABDOMINAL HYSTERECTOMY TOTAL      EYE SURGERY      cataract    HERNIA REPAIR      TONSILLECTOMY AND ADENOIDECTOMY         Family History:  Family History   Problem Relation Age of Onset    No Known Problems Mother     Cancer Father        Medications:  Current Facility-Administered Medications   Medication Dose    Respiratory Therapy Consult      Pharmacy Consult Request ...Pain Management Review 1 Each  1 Each    hydrALAZINE (Apresoline) tablet 25 mg  25 mg    senna-docusate (Pericolace Or Senokot S) 8.6-50 MG per tablet 2 Tablet  2 Tablet    And    polyethylene glycol/lytes (Miralax) Packet 1 Packet  1 Packet    [START ON 7/24/2024] omeprazole (PriLOSEC) capsule 20 mg  20 mg    ondansetron (Zofran ODT) dispertab 4 mg  4 mg    Or    ondansetron (Zofran) syringe/vial injection 4 mg  4 mg    sodium chloride (Ocean) 0.65 % nasal spray 2 Spray  2 Spray    enoxaparin (Lovenox) inj 40 mg  40 mg    gabapentin (Neurontin) capsule 100 mg  100 mg    telmisartan (Micardis) tablet 40 mg  40 mg    acetaminophen (Tylenol) tablet 1,000 mg  1,000 mg    oxyCODONE immediate-release (Roxicodone) tablet 2.5 mg  2.5 mg    Or    oxyCODONE immediate-release (Roxicodone) tablet 5 mg  5 mg       Allergies:  Iodine    Psychosocial History:  Prior Living Situation:   Housing / Facility: 1 Story House  Steps Into Home: 1  Steps In Home: 0  Lives with - Patient's Self Care Capacity: Spouse  Equipment Owned: Hand Held Shower     Prior Level of Function / Living Situation:   Physical Therapy: Prior Services: Home-Independent  Housing / Facility: 1 Story House  Steps Into Home: 1  Steps In Home: 0  Bathroom Set up: Walk In Shower  Equipment Owned: Hand Held Shower  Lives with - Patient's Self Care  Capacity: Spouse  Bed Mobility: Independent  Transfer Status: Independent  Ambulation: Independent  Assistive Devices Used: None  Stairs: Independent  Current Level of Function:   Gait Level Of Assist: Moderate Assist  Assistive Device: Quad Cane  Distance (Feet): 2  Deviation: Antalgic, Bradykinetic, Shuffled Gait  Weight Bearing Status: NWB R UE and WBAT L LE  Skilled Intervention: Sequencing, Tactile Cuing, Verbal Cuing, Compensatory Strategies  Supine to Sit: Minimal Assist  Sit to Supine: Moderate Assist  Scooting: Minimal Assist  Skilled Intervention: Verbal Cuing, Tactile Cuing, Sequencing, Compensatory Strategies  Comments: HOB elevated  Sit to Stand: Moderate Assist  Bed, Chair, Wheelchair Transfer: Moderate Assist  Toilet Transfers: Moderate Assist (EOB<>BSC)  Transfer Method: Stand Pivot  Skilled Intervention: Sequencing, Tactile Cuing, Verbal Cuing, Compensatory Strategies  Sitting in Chair: post  Sitting Edge of Bed: 5+ min  Standing: <2 min  Occupational Therapy:   Self Feeding: Independent  Grooming / Hygiene: Independent  Bathing: Independent  Dressing: Independent  Toileting: Independent  Medication Management: Independent  Laundry: Independent  Kitchen Mobility: Independent  Finances: Independent  Home Management: Independent  Shopping: Independent  Prior Level Of Mobility: Independent Without Device in Community  Driving / Transportation: Driving Independent  Prior Services: Home-Independent  Housing / Facility: [address]  Occupation (Pre-Hospital Vocational): Retired Due To Age (retired )  Leisure Interests: Hobbies, Exercise  Current Level of Function:   Eating: Supervision  Lower Body Dressing: Maximal Assist  Toileting:  (able to perform her hygiene seated on BSC but anticipate pt would require increased assistance for hygiene after BM and clothing mgmt)    CURRENT LEVEL OF FUNCTION:   Same as level of function prior to admission to [facility]    Physical Examination:     VITAL SIGNS:   vitals were not taken for this visit.   General: Well developed, Well nourished, No Acute Distress  HEENT: Normocephalic, atraumatic. Anicteric sclera  Cardiac: Physiologic rate and regular rhythm, no murmurs  Pulmonary: Lungs are clear to auscultation bilaterally, comfortable on room air  Abdomen: Soft, non-tender, non-distended. Bowel sounds normoactive.   Extremities: Warm and well perfused, no clubbing, cyanosis. No edema.   Skin: No visible rashes or lesions.   Psych: calm, no agitation, congruent mood/affect    NEUROLOGIC EXAM:  Gen:  Alert and oriented to person, place, date, and circumstance. Appropriately interactive  Speech/Language/Comprehension: Fluent speech w/o paraphasic errors, follows simple and complex commands without L/R confusion .?  Attention: Attentive to conversation.   Memory: Recalls her primary reason for being in the hospital  Judgment: Demonstrates appropriate use of call light ???    Cranial Nerves:   II:   Visual fields full to confrontation. Pupils equally round & reactive to light. ??  III, IV, VI:  EOMI w/o nystagmus or diplopia. No ptosis.????  V:  Sensation intact to light touch. ??  VII:  Face symmetric without weakness.????  VIII:  Hears functionally.????  IX, X:  Voice normal. Palate elevates symmetrically.????  XI:  Trapezii full.????  XII:  Tongue protrudes midline.????    Motor:?  ?? Delt???? Bi???? Tri???? Wrist ??  Ext?? DIP flex ??  (FDP)?? Finger ABd?? IP???? Quad???? Hamst???? TibAnt???? EHL???? Plantar  flexion   ???? C5???? C6???? C7???? ?C6?? C8/T1?? C8/T1???? L2???? L3???? L4-S1???? L4???? L5???? S1   L???? 5/5 5/5 5/5 5/5 5/5 5/5 3/5 3/5 3/5 3/5 3/5 3/5   R???? 1/5 3/5 3/5 4/5 4/5 5/5 5/5 5/5 5/5 5/5 4/5 4/5       Sensation: Intact to light touch in the major dermatomes of the upper and lower extremities.     Coordination: Normal finger-to-nose testing without dysmetria. Normal rapid alternating movements.      Reflexes: Reflexes are 2+ at the biceps, triceps, brachioradialis, patella, and achilles.       Laboratory Values:  Recent Labs     07/21/24  0357   SODIUM 138   POTASSIUM 4.1   CHLORIDE 104   CO2 22   GLUCOSE 114*   BUN 20   CREATININE 0.65   CALCIUM 9.0     Recent Labs     07/21/24  0357   WBC 6.8   RBC 3.13*   HEMOGLOBIN 9.6*   HEMATOCRIT 28.8*   MCV 92.0   MCH 30.7   MCHC 33.3   RDW 43.8   PLATELETCT 175   MPV 10.5           Primary Rehabilitation Diagnosis:    This patient is a 79 y.o. female admitted for acute inpatient rehabilitation with Multiple trauma.    Impairments:   Cognitive  ADLs/IADLs  Mobility    Secondary Diagnosis/Medical Co-morbidities Affecting Function  HTN, hypotension, ABLA    Relevant Changes Since Preadmission Evaluation:    Status unchanged    The patient's rehabilitation potential is Good  The patient's medical prognosis is good    Rehabilitation Plan:   Discussion and Recommendations:   1. The patient requires an acute inpatient rehabilitation program with a coordinated program of care at an intensity and frequency not available at a lower level of care. This recommendation is substantiated by the patient's medical physicians who recommend that the patient's intervention and assessment of medical issues needs to be done at an acute level of care for patient's safety and maximum outcome.   2. A coordinated program of care will be supplied by an interdisciplinary team of physical therapy, occupational therapy, rehab physician, rehab nursing, and, if needed, speech therapy and rehab psychology. Rehab team presents a patient-specific rehabilitation and education program concentrating on prevention of future problems related to accessibility, mobility, skin, bowel, bladder, sexuality, and psychosocial and medical/surgical problems.   3. Need for Rehabilitation Physician: The rehab physician will be evaluating the patient on a multi-weekly basis to help coordinate the program of care. The  rehab physician communicates between medical physicians, therapists, and nurses to maximize the patient's potential outcome. Specific areas in which the rehab physician will be providing daily assessment include the following:   A. Assessing the patient's heart rate and blood pressure response (vitals monitoring) to activity and making adjustments in medications or conservative measures as needed.   B. The rehab physician will be assessing the frequency at which the program can be increased to allow the patient to reach optimal functional outcome.   C. The rehab physician will also provide assessments in daily skin care, especially in light of patient's impairments in mobility.   D. The rehab physician will provide special expertise in understanding how to work with functional impairment and recommend appropriate interventions, compensatory techniques, and education that will facilitate the patient's outcome.   4. Rehab R.N.   The rehab RN will be working with patient to carry over in room mobility and activities of daily living when the patient is not in 3 hours of skilled therapy. Rehab nursing will be working in conjunction with rehab physician to address all the medical issues above and continue to assess laboratory work and discuss abnormalities with the treating physicians, assess vitals, and response to activity, and discuss and report abnormalities with the rehab physician. Rehab RN will also continue daily skin care, supervise bladder/bowel program, instruct in medication administration, and ensure patient safety.   5. Rehab Therapy: Therapies to treat at intensity and frequency of (may change after completion of evaluation by all therapeutic disciplines):       PT:  Physical therapy to address mobility, transfer, gait training and evaluation for adaptive equipment needs 1.5 hour/day at least 5 days/week for the duration of the ELOS (see below)       OT:  Occupational therapy to address ADLs, self-care,  home management training, functional mobility/transfers and assistive device evaluation, and community re-integration 1 .5 hour/day at least 5 days/week for the duration of the ELOS (see below).     Medical management / Rehabilitation Issues/ Adverse Potential as part of rehabilitation plan     Rehabilitation Issues/Adverse Potential  1.  Multitrauma Patient demonstrates functional deficits in strength, balance, coordination, and ADL's. Patient [facility] for comprehensive rehabilitation therapy as described below.   Rehabilitation nursing monitors bowel and bladder control, educates on medication administration, co-morbidities and monitors patient safety.  .    2.  Neurostimulants: None at this time but continue to assess daily for need to initiate should status change.    3.  DVT prophylaxis:  Patient is on Lovenox for anticoagulation upon transfer. Encourage OOB. Monitor daily for signs and symptoms of DVT including but not limited to swelling and pain to prevent the development of DVT that may interfere with therapies.    4.  GI prophylaxis:  On prilosec to prevent gastritis/dyspepsia which may interfere with therapies.    5.  Pain: No issues with pain currently / Controlled with tylenol, oxycodone    6.  Nutrition/Dysphagia: Dietician monitors nutrient intake, recommend supplements prn and provide nutrition education to pt/family to promote optimal nutrition for wound healing/recovery.     7.  Bladder/bowel:  Start bowel and bladder program as described below, to prevent constipation, urinary retention (which may lead to UTI), and urinary incontinence (which will impact upon pt's functional independence).   - TV Q3h while awake with post void bladder scans, I&O cath for PVRs >400  - up to commode after meal     8.  Skin/dermal ulcer prophylaxis: Monitor for new skin conditions with q.2 h. turns as required to prevent the development of skin breakdown.     9.   Cognition/Behavior: As needed psychologist provides adjustment counseling to illness and psychosocial barriers that may be potential barriers to rehabilitation.     10. Respiratory therapy: RT performs O2 management prn, breathing retraining, pulmonary hygiene and bronchospasm management prn to optimize participation in therapies.     Medical Co-Morbidities/Adverse Potential Affecting Function:  Fracture of right (dominant side) humerus   - 3 part proximal humerus fracture   - s/p closed manipulation and treatment by Dr. Ramírez Denney MD on 7/16   - NWB RUE   - PT/OT continue     Fracture of left tibia   - Comminuted proximal tibia fracture   - S/p Open reduction internal fixation left tibial shaft with intramedullary nail by Dr. Ramírez Denney MD on 7/16   - WBAT LLE   - PT/OT while in house   - good candidate for IPR     Acute blood loss anemia   - Hgb 9.6, continues to drop   -Possible hematoma in left leg?  - Checking Ferritin, Iron studies      Orthostatic Hypotension   - Secondary to acute blood loss anemia   - IV fluids   - JACKIE hose   - Abdominal binder     Uncontrolled neuropathic pain   - Starting Gabapentin 100 mg TID     Pain control   - Tylenol 1g TID  - oxycodone 5-10mg Q3 PRN     Hypertension   - Telmisartan 40mg Q evening - holding      Circadian Rhythm disorder:   Recommend lights on during the day/off at night, minimize nighttime interruptions as able.     Mood  - at risk of adjustment disorder, depression, and anxiety due to functional decline    ID:  - at risk for Urinary tract infection    Skin/Wounds:  - Pressure relief q2h while in bed. Close monitoring for signs of breakdown    Pain:  - Neuroceptic - On Tylenol prn, oxycodone  - Neuropathic - On gabapentin    DVT prophylaxis:  Patient is on Lovenox.     GI prophylaxis:  On Prilosec 20mg daily       -Follow-up Ortho, PCP    I personally performed a complete drug regimen review and no potential clinically significant medication issues were  identified.     Goals/Expected Level of Function Based on Current Medical and Functional Status:  (may change based on patient's medical status and rate of impairment recovery)  Transfers:   Supervision  Mobility/Gait:   Supervision  ADL's:   Supervision    DISPOSITION: Discharge to pre-morbid independent living setting with the supportive care of patient's family.    ELOS: 7-12 days  ____________________________________    Derik Hays MD  Physical Medicine & Rehabilitation   Brain Injury Medicine   ____________________________________    Pt was seen today for 79 min, and entire time spent in face-to-face contact was >50% in counseling and coordination of care as detailed in A/P above.

## 2024-07-24 ENCOUNTER — APPOINTMENT (OUTPATIENT)
Dept: OCCUPATIONAL THERAPY | Facility: REHABILITATION | Age: 79
DRG: 560 | End: 2024-07-24
Attending: PHYSICAL MEDICINE & REHABILITATION
Payer: COMMERCIAL

## 2024-07-24 ENCOUNTER — APPOINTMENT (OUTPATIENT)
Dept: PHYSICAL THERAPY | Facility: REHABILITATION | Age: 79
DRG: 560 | End: 2024-07-24
Attending: PHYSICAL MEDICINE & REHABILITATION
Payer: COMMERCIAL

## 2024-07-24 LAB
ALBUMIN SERPL BCP-MCNC: 3.3 G/DL (ref 3.2–4.9)
ALBUMIN/GLOB SERPL: 1.1 G/DL
ALP SERPL-CCNC: 201 U/L (ref 30–99)
ALT SERPL-CCNC: 37 U/L (ref 2–50)
ANION GAP SERPL CALC-SCNC: 10 MMOL/L (ref 7–16)
AST SERPL-CCNC: 30 U/L (ref 12–45)
BASOPHILS # BLD AUTO: 0.8 % (ref 0–1.8)
BASOPHILS # BLD: 0.06 K/UL (ref 0–0.12)
BILIRUB SERPL-MCNC: 0.5 MG/DL (ref 0.1–1.5)
BUN SERPL-MCNC: 28 MG/DL (ref 8–22)
CALCIUM ALBUM COR SERPL-MCNC: 9.4 MG/DL (ref 8.5–10.5)
CALCIUM SERPL-MCNC: 8.8 MG/DL (ref 8.5–10.5)
CHLORIDE SERPL-SCNC: 103 MMOL/L (ref 96–112)
CO2 SERPL-SCNC: 24 MMOL/L (ref 20–33)
CREAT SERPL-MCNC: 0.73 MG/DL (ref 0.5–1.4)
EOSINOPHIL # BLD AUTO: 0.25 K/UL (ref 0–0.51)
EOSINOPHIL NFR BLD: 3.5 % (ref 0–6.9)
ERYTHROCYTE [DISTWIDTH] IN BLOOD BY AUTOMATED COUNT: 44.5 FL (ref 35.9–50)
EST. AVERAGE GLUCOSE BLD GHB EST-MCNC: 94 MG/DL
GFR SERPLBLD CREATININE-BSD FMLA CKD-EPI: 83 ML/MIN/1.73 M 2
GLOBULIN SER CALC-MCNC: 3 G/DL (ref 1.9–3.5)
GLUCOSE SERPL-MCNC: 108 MG/DL (ref 65–99)
HBA1C MFR BLD: 4.9 % (ref 4–5.6)
HCT VFR BLD AUTO: 29.9 % (ref 37–47)
HGB BLD-MCNC: 10 G/DL (ref 12–16)
IMM GRANULOCYTES # BLD AUTO: 0.05 K/UL (ref 0–0.11)
IMM GRANULOCYTES NFR BLD AUTO: 0.7 % (ref 0–0.9)
LYMPHOCYTES # BLD AUTO: 1.42 K/UL (ref 1–4.8)
LYMPHOCYTES NFR BLD: 20.1 % (ref 22–41)
MAGNESIUM SERPL-MCNC: 2.3 MG/DL (ref 1.5–2.5)
MCH RBC QN AUTO: 31 PG (ref 27–33)
MCHC RBC AUTO-ENTMCNC: 33.4 G/DL (ref 32.2–35.5)
MCV RBC AUTO: 92.6 FL (ref 81.4–97.8)
MONOCYTES # BLD AUTO: 0.58 K/UL (ref 0–0.85)
MONOCYTES NFR BLD AUTO: 8.2 % (ref 0–13.4)
NEUTROPHILS # BLD AUTO: 4.7 K/UL (ref 1.82–7.42)
NEUTROPHILS NFR BLD: 66.7 % (ref 44–72)
NRBC # BLD AUTO: 0 K/UL
NRBC BLD-RTO: 0 /100 WBC (ref 0–0.2)
PLATELET # BLD AUTO: 230 K/UL (ref 164–446)
PMV BLD AUTO: 10.5 FL (ref 9–12.9)
POTASSIUM SERPL-SCNC: 4.1 MMOL/L (ref 3.6–5.5)
PROT SERPL-MCNC: 6.3 G/DL (ref 6–8.2)
RBC # BLD AUTO: 3.23 M/UL (ref 4.2–5.4)
SODIUM SERPL-SCNC: 137 MMOL/L (ref 135–145)
WBC # BLD AUTO: 7.1 K/UL (ref 4.8–10.8)

## 2024-07-24 PROCEDURE — 700102 HCHG RX REV CODE 250 W/ 637 OVERRIDE(OP): Performed by: PHYSICAL MEDICINE & REHABILITATION

## 2024-07-24 PROCEDURE — 97110 THERAPEUTIC EXERCISES: CPT

## 2024-07-24 PROCEDURE — 80053 COMPREHEN METABOLIC PANEL: CPT

## 2024-07-24 PROCEDURE — 97162 PT EVAL MOD COMPLEX 30 MIN: CPT

## 2024-07-24 PROCEDURE — 97535 SELF CARE MNGMENT TRAINING: CPT

## 2024-07-24 PROCEDURE — 97530 THERAPEUTIC ACTIVITIES: CPT

## 2024-07-24 PROCEDURE — 36415 COLL VENOUS BLD VENIPUNCTURE: CPT

## 2024-07-24 PROCEDURE — 85025 COMPLETE CBC W/AUTO DIFF WBC: CPT

## 2024-07-24 PROCEDURE — 700111 HCHG RX REV CODE 636 W/ 250 OVERRIDE (IP): Mod: JZ | Performed by: PHYSICAL MEDICINE & REHABILITATION

## 2024-07-24 PROCEDURE — 97165 OT EVAL LOW COMPLEX 30 MIN: CPT

## 2024-07-24 PROCEDURE — 770010 HCHG ROOM/CARE - REHAB SEMI PRIVAT*

## 2024-07-24 PROCEDURE — 83036 HEMOGLOBIN GLYCOSYLATED A1C: CPT

## 2024-07-24 PROCEDURE — 99232 SBSQ HOSP IP/OBS MODERATE 35: CPT | Performed by: PHYSICAL MEDICINE & REHABILITATION

## 2024-07-24 PROCEDURE — 83735 ASSAY OF MAGNESIUM: CPT

## 2024-07-24 PROCEDURE — A9270 NON-COVERED ITEM OR SERVICE: HCPCS | Performed by: PHYSICAL MEDICINE & REHABILITATION

## 2024-07-24 RX ORDER — ACETAMINOPHEN 500 MG
1000 TABLET ORAL 3 TIMES DAILY
Status: DISCONTINUED | OUTPATIENT
Start: 2024-07-24 | End: 2024-08-07 | Stop reason: HOSPADM

## 2024-07-24 RX ADMIN — ACETAMINOPHEN 1000 MG: 500 TABLET, FILM COATED ORAL at 20:56

## 2024-07-24 RX ADMIN — OMEPRAZOLE 20 MG: 20 CAPSULE, DELAYED RELEASE ORAL at 08:41

## 2024-07-24 RX ADMIN — OXYCODONE HYDROCHLORIDE 5 MG: 5 TABLET ORAL at 13:23

## 2024-07-24 RX ADMIN — ENOXAPARIN SODIUM 40 MG: 100 INJECTION SUBCUTANEOUS at 17:17

## 2024-07-24 RX ADMIN — ACETAMINOPHEN 1000 MG: 500 TABLET, FILM COATED ORAL at 14:55

## 2024-07-24 RX ADMIN — GABAPENTIN 100 MG: 100 CAPSULE ORAL at 08:40

## 2024-07-24 RX ADMIN — GABAPENTIN 100 MG: 100 CAPSULE ORAL at 14:21

## 2024-07-24 RX ADMIN — ACETAMINOPHEN 1000 MG: 500 TABLET, FILM COATED ORAL at 08:46

## 2024-07-24 RX ADMIN — TELMISARTAN 40 MG: 40 TABLET ORAL at 20:56

## 2024-07-24 ASSESSMENT — GAIT ASSESSMENTS
ASSISTIVE DEVICE: HEMI-WALKER
GAIT LEVEL OF ASSIST: CONTACT GUARD ASSIST
DISTANCE (FEET): 0

## 2024-07-24 ASSESSMENT — PATIENT HEALTH QUESTIONNAIRE - PHQ9
2. FEELING DOWN, DEPRESSED, IRRITABLE, OR HOPELESS: NOT AT ALL
SUM OF ALL RESPONSES TO PHQ9 QUESTIONS 1 AND 2: 0
1. LITTLE INTEREST OR PLEASURE IN DOING THINGS: NOT AT ALL

## 2024-07-24 ASSESSMENT — PAIN DESCRIPTION - PAIN TYPE: TYPE: ACUTE PAIN

## 2024-07-24 ASSESSMENT — BRIEF INTERVIEW FOR MENTAL STATUS (BIMS)
BIMS SUMMARY SCORE: 14
WHAT MONTH IS IT: ACCURATE WITHIN 5 DAYS
ASKED TO RECALL BLUE: YES, NO CUE REQUIRED
WHAT DAY OF THE WEEK IS IT: INCORRECT
ASKED TO RECALL BED: YES, NO CUE REQUIRED
INITIAL REPETITION OF BED BLUE SOCK - FIRST ATTEMPT: 3
WHAT YEAR IS IT: CORRECT
ASKED TO RECALL SOCK: YES, NO CUE REQUIRED

## 2024-07-24 ASSESSMENT — ACTIVITIES OF DAILY LIVING (ADL)
BED_CHAIR_WHEELCHAIR_TRANSFER_DESCRIPTION: INCREASED TIME;VERBAL CUEING;SET-UP OF EQUIPMENT;INITIAL PREPARATION FOR TASK
TOILETING_LEVEL_OF_ASSIST_DESCRIPTION: GRAB BAR
BED_CHAIR_WHEELCHAIR_TRANSFER_DESCRIPTION: SET-UP OF EQUIPMENT;INITIAL PREPARATION FOR TASK
BED_CHAIR_WHEELCHAIR_TRANSFER_DESCRIPTION: SET-UP OF EQUIPMENT;SUPERVISION FOR SAFETY;VERBAL CUEING
BED_CHAIR_WHEELCHAIR_TRANSFER_DESCRIPTION: SET-UP OF EQUIPMENT;SUPERVISION FOR SAFETY;VERBAL CUEING
TOILETING: INDEPENDENT

## 2024-07-24 NOTE — THERAPY
Physical Therapy   Initial Evaluation     Patient Name: Gali Broderick  Age:  79 y.o., Sex:  female  Medical Record #: [id number]  Today's Date: 7/24/2024     Subjective    Patient was found in bed with  in room. Patient confirmed prior living situation and level of function. She reports fell happened because she was distracted and looking up at some gross instead of her feet. She does own some walking sticks but reports she did not enjoy using them. She reports pain all up and down her shin and calf and says she gets sharp pains in the shin.      Objective       07/24/24 1231   PT Charge Group   PT Therapeutic Exercise (Units) 1   PT Evaluation PT Evaluation Mod   PT Total Time Spent   PT Individual Total Time Spent (Mins) 60   Prior Living Situation   Prior Services None   Housing / Facility 1 Story House   Steps Into Home 1   Steps In Home 0   Bathroom Set up Walk In Shower   Equipment Owned   (Walking sticks but has not liked them when she has tried)   Lives with - Patient's Self Care Capacity Spouse   Prior Functioning: Everyday Activities   Self Care Independent   Indoor Mobility (Ambulation) Independent   Stairs Independent   Functional Cognition Independent   Prior Device Use None of the given options   Pain 0 - 10 Group   Location Arm;Knee   Location Orientation Right;Left   Description Sharp   Comfort Goal Comfort with Movement;Perform Activity   Balance Assessment   Sitting Balance (Static) Good   Sitting Balance (Dynamic) Good   Standing Balance (Static) Poor   Standing Balance (Dynamic) Poor   Weight Shift Sitting Fair   Weight Shift Standing Poor   Bed Mobility    Supine to Sit Supervised   Sit to Supine Supervised   Sit to Stand Contact Guard Assist   Scooting Independent   Rolling Independent   Roll Left and Right   Assistance Needed Supervision   Physical Assistance Level No physical assistance   CARE Score - Roll Left and Right 4   Roll Left and Right Discharge Goal   Discharge Goal 6   Sit to  Lying   Assistance Needed Supervision   Physical Assistance Level No physical assistance   CARE Score - Sit to Lying 4   Sit to Lying Discharge Goal   Discharge Goal 6   Lying to Sitting on Side of Bed   Assistance Needed Supervision   Physical Assistance Level No physical assistance   CARE Score - Lying to Sitting on Side of Bed 4   Lying to Sitting on Side of Bed Discharge Goal   Discharge Goal 6   Sit to Stand   Assistance Needed Set-up / clean-up;Supervision;Verbal cues   Physical Assistance Level No physical assistance   CARE Score - Sit to Stand 4   Sit to Stand Discharge Goal   Discharge Goal 6   Chair/Bed-to-Chair Transfer   Assistance Needed Set-up / clean-up;Supervision;Verbal cues;Incidental touching   Physical Assistance Level 25% or less   CARE Score - Chair/Bed-to-Chair Transfer 3   Chair/Bed-to-Chair Transfer Discharge Goal   Discharge Goal 6   Toilet Transfer   Assistance Needed Supervision;Verbal cues;Set-up / clean-up;Incidental touching   Physical Assistance Level 25% or less   CARE Score - Toilet Transfer 3   Toilet Transfer Discharge Goal   Discharge Goal 6   Car Transfer   Reason if not Attempted Safety concerns   CARE Score - Car Transfer 88   Walk 10 Feet   Assistance Needed Physical assistance   Physical Assistance Level Total assistance   CARE Score - Walk 10 Feet 1   Walk 10 Feet Discharge Goal   Discharge Goal 4   Walk 50 Feet with Two Turns   Reason if not Attempted Safety concerns   CARE Score - Walk 50 Feet with Two Turns 88   Walk 50 Feet with Two Turns Discharge Goal   Discharge Goal 4   Walk 150 Feet   Reason if not Attempted Safety concerns   CARE Score - Walk 150 Feet 88   Walk 150 Feet Discharge Goal   Discharge Goal 4   Walking 10 Feet on Uneven Surfaces   Reason if not Attempted Safety concerns   CARE Score - Walking 10 Feet on Uneven Surfaces 88   Walking 10 Feet on Uneven Surfaces Discharge Goal   Discharge Goal 4   1 Step (Curb)   Reason if not Attempted Safety concerns    CARE Score - 1 Step (Curb) 88   1 Step (Curb) Discharge Goal   Discharge Goal 4   4 Steps   Reason if not Attempted Safety concerns   CARE Score - 4 Steps 88   4 Steps Discharge Goal   Discharge Goal 4   12 Steps   Reason if not Attempted Safety concerns   CARE Score - 12 Steps 88   12 Steps Discharge Goal   Discharge Goal 4   Picking Up Object   Reason if not Attempted Safety concerns   CARE Score - Picking Up Object 88   Picking Up Object Discharge Goal   Discharge Goal 4   Wheel 50 Feet with Two Turns   Reason if not Attempted Activity not applicable   CARE Score - Wheel 50 Feet with Two Turns 9   Wheel 150 Feet   Reason if not Attempted Activity not applicable   CARE Score - Wheel 150 Feet 9   Gait Functional Level of Assist    Gait Level Of Assist Contact Guard Assist   Assistive Device Jelani-Walker   Distance (Feet) 0  (Attempted to take steps but unable to step forward, practiced weight shifts)   Transfer Functional Level of Assist   Bed, Chair, Wheelchair Transfer Minimal Assist   Bed Chair Wheelchair Transfer Description Set-up of equipment;Supervision for safety;Verbal cueing  (Patient moves quickly and needed some physical assistance to control movement, verbal cues for hand placement)   Problem List    Problems Pain;Impaired Transfers;Impaired Ambulation;Functional ROM Deficit;Functional Strength Deficit;Impaired Balance;Impaired Coordination;Decreased Activity Tolerance;Safety Awareness Deficits / Cognition   Precautions   Precautions Weight Bearing As Tolerated Left Lower Extremity;Non Weight Bearing Right Upper Extremity;Fall Risk;Sling Right Upper Extremity   Current Discharge Plan   Current Discharge Plan Return to Prior Living Situation   Interdisciplinary Plan of Care Collaboration   IDT Collaboration with  Nursing;Family / Caregiver   Patient Position at End of Therapy In Bed;Bed Alarm On;Call Light within Reach;Tray Table within Reach;Family / Friend in Room;Phone within Reach   Collaboration  Comments nursing gave oxycodone at end of treatment   PT DME Recommendations   Assistive Device Jelani Walker   Physical Therapist Assigned   Assigned PT / Treatment Time / Comments EB/JR/90min   Benefit   Therapy Benefit Patient Would Benefit from Inpatient Rehabilitation Physical Therapy to Maximize Functional Dawson with ADLs, IADLs and Mobility.   Strengths & Barriers   Strengths Able to follow instructions;Alert and oriented;Effective communication skills;Independent prior level of function;Motivated for self care and independence;Pleasant and cooperative;Willingly participates in therapeutic activities;Supportive family   Barriers Decreased endurance;Fatigue;Generalized weakness;Impaired activity tolerance;Impaired balance;Limited mobility;Pain;Impulsive     Ther Ex:  Seated Marches 1x10 each leg  Long Arq Quads 1x10 each leg  Hip abduction with orange band 1x10   Hip adduction with ball 1x10  Hamstring curls with orange band 1x10 each leg    Assessment  Patient is 79 y.o. female with a diagnosis of a left tibial shaft fracture and right threepart proximal humerus fracture .  She was taken to the OR by Dr. Ramírez Denney MD on 7/16 for ORIF left tibal shaft with IM nail and closed treatment of right humerus fracture. Postoperatively she is WBAT LLE and NWB with sling for RUE. Additional factors influencing patient status / progress (ie: cognitive factors, co-morbidities, social support, etc): patient is somewhat impulsive and moves quickly through transfers.  She wants to do things on her own and doesn't always wait for the therapists help. She requires cueing for hand placement and to slow down the movement to complete it safely. She is very motivated and determined to get better and get back to walking, has good family support and an independent PLOF, all of which give her a good prognosis.     Plan  Recommend Physical Therapy  minutes per day 5-7 days per week for 12-14 days for the following  treatments:  PT Group Therapy, PT Gait Training, PT Self Care/Home Eval, PT Therapeutic Exercises, PT Neuro Re-Ed/Balance, PT Therapeutic Activity, PT Manual Therapy, and PT Evaluation.    Passport items to be completed:  Get in/out of bed safely, in/out of a vehicle, safely use mobility device, walk or wheel around home/community, navigate up and down stairs, show how to get up/down from the ground, ensure home is accessible, demonstrate HEP, complete caregiver training    Goals:  Long term and short term goals have been discussed with patient and spouse and they are in agreement.    Physical Therapy Problems (Active)       Problem: Balance       Dates: Start:  07/24/24         Goal: STG-Within one week, patient will maintain dynamic standing during AP and lateral weight shifts on firm surface for one min with contact guard assist.        Dates: Start:  07/24/24            Goal: STG-Within one week, patient will tolerate caputo balance test.       Dates: Start:  07/24/24               Problem: Mobility       Dates: Start:  07/24/24         Goal: STG-Within one week, patient will ambulate 10 ft using hemiwalker with contact guard assist.        Dates: Start:  07/24/24            Goal: STG-Within one week, patient will ambulate up/down a curb using hemiwalker and contact guard assist.        Dates: Start:  07/24/24               Problem: Mobility Transfers       Dates: Start:  07/24/24         Goal: STG-Within one week, patient will transfer bed to chair with stand pivot transfer safely with supervision assist.        Dates: Start:  07/24/24               Problem: PT-Long Term Goals       Dates: Start:  07/24/24         Goal: LTG-By discharge, patient will receive >35/56 on caputo balance test.        Dates: Start:  07/24/24            Goal: LTG-By discharge, patient will ambulate 100 ft using hemiwalker or LRAD with supervision assist.        Dates: Start:  07/24/24            Goal: LTG-By discharge, patient will  perform home exercise program using handouts independently.        Dates: Start:  07/24/24            Goal: LTG-By discharge, patient will transfer in/out of a car using hemiwalker with supervision assist.        Dates: Start:  07/24/24

## 2024-07-24 NOTE — WOUND TEAM
[facility]  Inpatient Services  Initial Wound and Skin Care Evaluation    Admission Date: 7/23/2024     Last order of IP CONSULT TO WOUND CARE was found on 7/23/2024 from Hospital Encounter on 7/23/2024     HPI, PMH, SH: Reviewed    Past Surgical History:   Procedure Laterality Date    TIBIA NAILING INTRAMEDULLARY Left 7/16/2024    Procedure: INSERTION, INTRAMEDULLARY EVANGELINA, TIBIA;  Surgeon: Ramírez Denney M.D.;  Location: SURGERY [facility];  Service: Orthopedics    ABDOMINAL HYSTERECTOMY TOTAL      EYE SURGERY      cataract    HERNIA REPAIR      TONSILLECTOMY AND ADENOIDECTOMY       Social History     Tobacco Use    Smoking status: Never    Smokeless tobacco: Never   Substance Use Topics    Alcohol use: Yes     Alcohol/week: 3.0 oz     Types: 5 Glasses of wine per week     No chief complaint on file.    Diagnosis: Multiple trauma [T07.XXXA]    Unit where seen by Wound Team: [facility]14/01     WOUND CONSULT RELATED TO:  sacrum    WOUND TEAM PLAN OF CARE - Frequency of Follow-up:   Nursing to follow dressing orders written for wound care. Contact wound team if area fails to progress, deteriorates or with any questions/concerns if something comes up before next scheduled follow up (See below as to whether wound is following and frequency of wound follow up)   Weekly - Wednesday    WOUND HISTORY:   PT had a GLF 7/15/2024 resulting in a left tibial and right humerus fractures. LLE with ORF. Pt has area of concern on sacrum, appears to be moisture related. Area is open full thickness with red blanchable tissue. Surrounding area pink and blanchable with two abrasions or bruises most likely from fall.        WOUND ASSESSMENT/LDA  Wound 07/23/24 Coccyx (Active)   Date First Assessed/Time First Assessed: 07/23/24 1648   Present on Original Admission: Yes  Hand Hygiene Completed: Yes  Location: Coccyx      Assessments 7/24/2024 10:00 AM   Wound Image     Site Assessment Red;Pink   Periwound Assessment Intact    Margins Unattached edges   Drainage Amount None   Treatments Site care;Nonselective debridement;Cleansed;Offloading   Wound Cleansing Approved Wound Cleanser   NEXT Weekly Photo (Inpatient Only) 07/31/24   Wound Team Following Weekly   Wound Length (cm) 3.8 cm   Wound Width (cm) 2 cm   Wound Surface Area (cm^2) 7.6 cm^2   Wound Bed Granulation (%) 100 %   Shape oval   WOUND NURSE ONLY - Time Spent with Patient (mins) 30        Vascular:    MARCE:   No results found.    Lab Values:    Lab Results   Component Value Date/Time    WBC 7.1 07/24/2024 06:05 AM    RBC 3.23 (L) 07/24/2024 06:05 AM    HEMOGLOBIN 10.0 (L) 07/24/2024 06:05 AM    HEMATOCRIT 29.9 (L) 07/24/2024 06:05 AM    HBA1C 4.9 07/24/2024 06:05 AM         Culture Results show:  No results found for this or any previous visit (from the past 720 hour(s)).    Pain Level/Medicated:  None, Tolerated without pain medication       INTERVENTIONS BY WOUND TEAM:  Chart and images reviewed. Discussed with bedside RN. All areas of concern (based on picture review, LDA review and discussion with bedside RN) have been thoroughly assessed. Documentation of areas based on significant findings. This RN in to assess patient. Performed standard wound care which includes appropriate positioning, dressing removal and non-selective debridement. Pictures and measurements obtained weekly if/when required.    Wound:  Sacrum  Cleansed/Non-selectively Debrided with:  Wound cleanser and Gauze  Offloading dressing applied    Advanced Wound Care Discharge Planning  Number of Clinicians necessary to complete wound care: 1  Is patient requiring IV pain medications for dressing changes:  No   Length of time for dressing change 30 min. (This does not include chart review, pre-medication time, set up, clean up or time spent charting.)    Interdisciplinary consultation: Patient, Bedside RN Leigh (Cassandra) .  Pressure injury and staging reviewed with  Trena .    EVALUATION / RATIONALE  FOR TREATMENT:     Date:  07/24/24  Wound Status:  Initial evaluation    PT had a GLF 7/15/2024 resulting in a left tibial and right humerus fractures. LLE with ORF. Pt has area of concern on sacrum, appears to be moisture related. Area is open full thickness with red blanchable tissue. Surrounding area pink and blanchable with two abrasions or bruises most likely from fall.         Goals: Steady decrease in wound area and depth weekly.    NURSING PLAN OF CARE ORDERS:  RN Prevention Protocol    NUTRITION RECOMMENDATIONS   Wound Team Recommendations:  N/A    DIET ORDERS (From admission to next 24h)       Start     Ordered    07/23/24 1547  Diet Order Diet: Regular  ALL MEALS        Question:  Diet:  Answer:  Regular    07/23/24 6644                    PREVENTATIVE INTERVENTIONS:    Q shift Sascha - performed per nursing policy  Q shift pressure point assessments - performed per nursing policy    Surface/Positioning  Reposition q 2 hours - Currently in Place  Waffle overlay  - Currently in Place    Offloading/Redistribution  Float Heels off Bed with Pillows - Currently in Place         Anticipated discharge plans:  Self/Family Care        Vac Discharge Needs:  Vac Discharge plan is purely a recommendation from wound team and not a requirement for discharge unless otherwise stated by physician.  Not Applicable Pt not on a wound vac

## 2024-07-24 NOTE — PROGRESS NOTES
NURSING DAILY NOTE    Name: Gali Broderick   Date of Admission: 7/23/2024   Admitting Diagnosis: Multiple trauma  Attending Physician: Derik Hays M.d.  Allergies: Iodine    Safety  Patient Assist     Patient Precautions     Precaution Comments     Bed Transfer Status     Toilet Transfer Status      Assistive Devices     Oxygen  None - Room Air  Diet/Therapeutic Dining  Current Diet Order   Procedures    Diet Order Diet: Regular     Pill Administration  whole  Agitated Behavioral Scale     ABS Level of Severity       Fall Risk  Has the patient had a fall this admission?   No  Fifi Evangelista Fall Risk Scoring  15, HIGH RISK  Fall Risk Safety Measures  bed alarm, chair alarm, and poor balance    Vitals  Temperature: 36.6 °C (97.8 °F)  Temp src: Oral  Pulse: 90  Respiration: 15  Blood Pressure : 130/52  Blood Pressure MAP (Calculated): 78 MM HG  BP Location: Left, Upper Arm  Patient BP Position: Supine     Oxygen  Pulse Oximetry: 92 %  O2 (LPM): 0  O2 Delivery Device: None - Room Air    Bowel and Bladder  Last Bowel Movement  07/22/24  Stool Type     Bowel Device     Continent  Bladder: Did not void   Bowel: No movement  Bladder Function     Genitourinary Assessment        Skin  Sascha Score   17  Sensory Interventions      Moisture Interventions         Pain  Pain Rating Scale  2 - Notice Pain, does not interfere with activities  Pain Location  Arm, Knee  Pain Location Orientation  Left, Right  Pain Interventions   Declines    ADLs    Bathing      Linen Change      Personal Hygiene     Chlorhexidine Bath      Oral Care     Teeth/Dentures     Shave     Nutrition Percentage Eaten     Environmental Precautions     Patient Turns/Positioning  Supine  Patient Turns Assistance/Tolerance     Bed Positions     Head of Bed Elevated         Psychosocial/Neurologic Assessment  Psychosocial Assessment     Neurologic Assessment  Neuro (WDL): Exceptions to WDL  Level of  Consciousness: Alert  Orientation Level: Oriented X4  Cognition: Appropriate judgement  Speech: Clear  RUE Sensation: Tingling, Pain  LLE Sensation: Tingling, Pain       Cardio/Pulmonary Assessment  Edema      Respiratory Breath Sounds  RUL Breath Sounds: Clear  RML Breath Sounds: Clear  RLL Breath Sounds: Clear  BERTA Breath Sounds: Clear  LLL Breath Sounds: Clear  Cardiac Assessment

## 2024-07-24 NOTE — PROGRESS NOTES
Patient care assumed. Report received from [facility] CHITRA Barron. Patient is alert and calm, resting in bed. Call light and bedside table within reach. Will continue to monitor.

## 2024-07-24 NOTE — PROGRESS NOTES
"  Physical Medicine & Rehabilitation Progress Note    Encounter Date: 7/24/2024    Chief Complaint: Weakness    Interval Events (Subjective):  Seen in chair. Tolerating therapy. left leg painful but improving. Will continue current therapy.    Objective:  VITAL SIGNS: /49   Pulse 78   Temp 37 °C (98.6 °F) (Oral)   Resp 18   Ht 1.626 m (5' 4\")   Wt 76.7 kg (169 lb)   SpO2 94%   BMI 29.01 kg/m²   Gen: No acute distress, well developed well nourished adult  HEENT: Normal Cephalic Atraumatic, Normal conjunctiva.   CV: warm extremities, well perfused, no edema  Resp: symmetric chest rise, breathing comfortably on room air  Abd: Soft, Non distended  Extremities: normal bulk, no atrophy  Skin: no visible rashes or lesions.   Neuro: alert, awake  Psych: Mood and affect appropriate and congruent    Laboratory Values:  Recent Results (from the past 72 hour(s))   FERRITIN    Collection Time: 07/22/24  3:39 PM   Result Value Ref Range    Ferritin 217.0 10.0 - 291.0 ng/mL   IRON/TOTAL IRON BIND    Collection Time: 07/22/24  3:39 PM   Result Value Ref Range    Iron 49 40 - 170 ug/dL    Total Iron Binding 247 (L) 250 - 450 ug/dL    Unsat Iron Binding 198 110 - 370 ug/dL    % Saturation 20 15 - 55 %   CBC with Differential    Collection Time: 07/24/24  6:05 AM   Result Value Ref Range    WBC 7.1 4.8 - 10.8 K/uL    RBC 3.23 (L) 4.20 - 5.40 M/uL    Hemoglobin 10.0 (L) 12.0 - 16.0 g/dL    Hematocrit 29.9 (L) 37.0 - 47.0 %    MCV 92.6 81.4 - 97.8 fL    MCH 31.0 27.0 - 33.0 pg    MCHC 33.4 32.2 - 35.5 g/dL    RDW 44.5 35.9 - 50.0 fL    Platelet Count 230 164 - 446 K/uL    MPV 10.5 9.0 - 12.9 fL    Neutrophils-Polys 66.70 44.00 - 72.00 %    Lymphocytes 20.10 (L) 22.00 - 41.00 %    Monocytes 8.20 0.00 - 13.40 %    Eosinophils 3.50 0.00 - 6.90 %    Basophils 0.80 0.00 - 1.80 %    Immature Granulocytes 0.70 0.00 - 0.90 %    Nucleated RBC 0.00 0.00 - 0.20 /100 WBC    Neutrophils (Absolute) 4.70 1.82 - 7.42 K/uL    Lymphs " (Absolute) 1.42 1.00 - 4.80 K/uL    Monos (Absolute) 0.58 0.00 - 0.85 K/uL    Eos (Absolute) 0.25 0.00 - 0.51 K/uL    Baso (Absolute) 0.06 0.00 - 0.12 K/uL    Immature Granulocytes (abs) 0.05 0.00 - 0.11 K/uL    NRBC (Absolute) 0.00 K/uL   Comp Metabolic Panel (CMP)    Collection Time: 07/24/24  6:05 AM   Result Value Ref Range    Sodium 137 135 - 145 mmol/L    Potassium 4.1 3.6 - 5.5 mmol/L    Chloride 103 96 - 112 mmol/L    Co2 24 20 - 33 mmol/L    Anion Gap 10.0 7.0 - 16.0    Glucose 108 (H) 65 - 99 mg/dL    Bun 28 (H) 8 - 22 mg/dL    Creatinine 0.73 0.50 - 1.40 mg/dL    Calcium 8.8 8.5 - 10.5 mg/dL    Correct Calcium 9.4 8.5 - 10.5 mg/dL    AST(SGOT) 30 12 - 45 U/L    ALT(SGPT) 37 2 - 50 U/L    Alkaline Phosphatase 201 (H) 30 - 99 U/L    Total Bilirubin 0.5 0.1 - 1.5 mg/dL    Albumin 3.3 3.2 - 4.9 g/dL    Total Protein 6.3 6.0 - 8.2 g/dL    Globulin 3.0 1.9 - 3.5 g/dL    A-G Ratio 1.1 g/dL   HEMOGLOBIN A1C    Collection Time: 07/24/24  6:05 AM   Result Value Ref Range    Glycohemoglobin 4.9 4.0 - 5.6 %    Est Avg Glucose 94 mg/dL   Magnesium    Collection Time: 07/24/24  6:05 AM   Result Value Ref Range    Magnesium 2.3 1.5 - 2.5 mg/dL   ESTIMATED GFR    Collection Time: 07/24/24  6:05 AM   Result Value Ref Range    GFR (CKD-EPI) 83 >60 mL/min/1.73 m 2       Medications:  Scheduled Medications   Medication Dose Frequency    Pharmacy Consult Request  1 Each PHARMACY TO DOSE    senna-docusate  2 Tablet BID    omeprazole  20 mg DAILY    enoxaparin (LOVENOX) injection  40 mg DAILY AT 1800    gabapentin  100 mg TID    telmisartan  40 mg Q EVENING     PRN medications: Respiratory Therapy Consult, hydrALAZINE, senna-docusate **AND** polyethylene glycol/lytes, ondansetron **OR** ondansetron, sodium chloride, acetaminophen, oxyCODONE immediate-release **OR** oxyCODONE immediate-release    Diet:  Current Diet Order   Procedures    Diet Order Diet: Regular       Medical Decision Making and Plan:  Fracture of right  (dominant side) humerus   - 3 part proximal humerus fracture   - s/p closed manipulation and treatment by Dr. Ramírez Denney MD on 7/16   - NWB RUE   - PT/OT continue     Fracture of left tibia   - Comminuted proximal tibia fracture   - S/p Open reduction internal fixation left tibial shaft with intramedullary nail by Dr. Ramírez Denney MD on 7/16   - WBAT LLE   - PT/OT while in house   - good candidate for IPR     Acute blood loss anemia   - Hgb 9.6, continues to drop   -Possible hematoma in left leg?  - Checking Ferritin, Iron studies      Orthostatic Hypotension   - Secondary to acute blood loss anemia   - IV fluids   - JACKIE hose   - Abdominal binder     Uncontrolled neuropathic pain   - Starting Gabapentin 100 mg TID    Hypertension   - Telmisartan 40mg Q evening - holding      Circadian Rhythm disorder:   Recommend lights on during the day/off at night, minimize nighttime interruptions as able.     Mood  - at risk of adjustment disorder, depression, and anxiety due to functional decline     ID:  - at risk for Urinary tract infection     Skin/Wounds:  - Pressure relief q2h while in bed. Close monitoring for signs of breakdown     Pain:  - Neuroceptic - On Tylenol prn, continue oxycodone 5mg Q4hrs PRN  - Neuropathic - continue gabapentin 100mg TID     DVT prophylaxis:  continue lovenox     GI prophylaxis:  On Prilosec 20mg daily         -Follow-up Ortho, PCP       ____________________________________    Derik Hays MD  Physical Medicine & Rehabilitation   Brain Injury Medicine   ____________________________________
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
## 2024-07-24 NOTE — THERAPY
"Physical Therapy   Daily Treatment     Patient Name: Gali Broderick  Age:  79 y.o., Sex:  female  Medical Record #: [id number]  Today's Date: 7/24/2024     Precautions  Precautions: Weight Bearing As Tolerated Left Lower Extremity, Non Weight Bearing Right Upper Extremity, Fall Risk, Sling Right Upper Extremity  Comments: pendulums as tolerated    Subjective    Patient is found in bed this afternoon, reports ice pack helped her pain. Verbalizes understanding to ask for pain meds before therapy.      Objective       07/24/24 1445   PT Charge Group   PT Therapeutic Exercise (Units) 2   PT Total Time Spent   PT Individual Total Time Spent (Mins) 30   Transfer Functional Level of Assist   Bed, Chair, Wheelchair Transfer Minimal Assist   Bed Chair Wheelchair Transfer Description Increased time;Verbal cueing;Set-up of equipment;Initial preparation for task   Supine Lower Body Exercise   Bridges Two Legged  (2 sets of 5)   Hip Abduction Hook Lying;2 sets of 10;Bilateral  (powderboard. Cues to not compensate by externally rotating at hip)   Straight Leg Raises Front;2 sets of 10;Bilateral   Short Arc Quad 2 sets of 10;Bilateral   Heel Slide 2 sets of 10;Bilateral   Ankle Pumps 2 sets of 10;Bilateral   Gluteal Isometrics 2 sets of 10;Bilateral   Quadriceps Isometrics 2 sets of 10;Bilateral   Bed Mobility    Supine to Sit Supervised   Sit to Supine Supervised   Sit to Stand Contact Guard Assist   Interdisciplinary Plan of Care Collaboration   Patient Position at End of Therapy In Bed;Bed Alarm On;Call Light within Reach;Tray Table within Reach;Phone within Reach;Family / Friend in Room         Assessment    Patient demonstrates some impulsivity during strengthening exercises, requires cues to move through full range of motion with control. She continues to demonstrate difficulty with coming to full stand and using either a \"shimmy\" or weight bearing through hemiwalker to scoot to the surface she's transferring to. She ends up " kind of landing on the next surface with limited control.     Strengths: Able to follow instructions, Alert and oriented, Effective communication skills, Independent prior level of function, Motivated for self care and independence, Pleasant and cooperative, Willingly participates in therapeutic activities, Supportive family  Barriers: Decreased endurance, Fatigue, Generalized weakness, Impaired activity tolerance, Impaired balance, Limited mobility, Pain, Impulsive    Plan    Progressing to gait with jelani walker  Strengthening in all planes  Balance  Transfer safety  Car transfers and stairs as able    DME  PT DME Recommendations  Assistive Device: Jelani Walker    Passport items to be completed:  Get in/out of bed safely, in/out of a vehicle, safely use mobility device, walk or wheel around home/community, navigate up and down stairs, show how to get up/down from the ground, ensure home is accessible, demonstrate HEP, complete caregiver training    Physical Therapy Problems (Active)       Problem: Balance       Dates: Start:  07/24/24         Goal: STG-Within one week, patient will maintain dynamic standing during AP and lateral weight shifts on firm surface for one min with contact guard assist.        Dates: Start:  07/24/24            Goal: STG-Within one week, patient will tolerate caputo balance test.       Dates: Start:  07/24/24               Problem: Mobility       Dates: Start:  07/24/24         Goal: STG-Within one week, patient will ambulate 10 ft using hemiwalker with contact guard assist.        Dates: Start:  07/24/24            Goal: STG-Within one week, patient will ambulate up/down a curb using hemiwalker and contact guard assist.        Dates: Start:  07/24/24               Problem: Mobility Transfers       Dates: Start:  07/24/24         Goal: STG-Within one week, patient will transfer bed to chair with stand pivot transfer safely with supervision assist.        Dates: Start:  07/24/24                Problem: PT-Long Term Goals       Dates: Start:  07/24/24         Goal: LTG-By discharge, patient will receive >35/56 on caputo balance test.        Dates: Start:  07/24/24            Goal: LTG-By discharge, patient will ambulate 100 ft using hemiwalker or LRAD with supervision assist.        Dates: Start:  07/24/24            Goal: LTG-By discharge, patient will perform home exercise program using handouts independently.        Dates: Start:  07/24/24            Goal: LTG-By discharge, patient will transfer in/out of a car using hemiwalker with supervision assist.        Dates: Start:  07/24/24
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
## 2024-07-24 NOTE — CARE PLAN
The patient is Stable - Low risk of patient condition declining or worsening    Shift Goals  Clinical Goals: safety  Patient Goals: sleep well, pain control    Progress made toward(s) clinical / shift goals:    Problem: Pain - Standard  Goal: Alleviation of pain or a reduction in pain to the patient’s comfort goal  Outcome: Progressing.  Patient denies having any pain over shift. Patient states she has pain when she moves but declined any prn oxycodone or tylenol over shift.      Problem: Fall Risk - Rehab  Goal: Patient will remain free from falls  Outcome: Progressing. Patient bed in lowest locked position with bed alarm on and call light within reach. Patient calls appropriately with call light for needs and has not attempted to self transfer over shift.

## 2024-07-24 NOTE — CARE PLAN
The patient is Stable - Low risk of patient condition declining or worsening    Shift Goals  Clinical Goals: safety  Patient Goals: safety    Problem: Mobility  Goal: Patient's capacity to carry out activities will improve  Outcome: Progressing patient has been up participating in therapies educated to take rest periods in between to avoid fatigue.     Problem: Pain - Standard  Goal: Alleviation of pain or a reduction in pain to the patient’s comfort goal  Outcome: Progressing Patient able to verbalize pain level and verbalize an acceptable level of pain.
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
## 2024-07-25 ENCOUNTER — APPOINTMENT (OUTPATIENT)
Dept: PHYSICAL THERAPY | Facility: REHABILITATION | Age: 79
DRG: 560 | End: 2024-07-25
Attending: PHYSICAL MEDICINE & REHABILITATION
Payer: COMMERCIAL

## 2024-07-25 ENCOUNTER — APPOINTMENT (OUTPATIENT)
Dept: OCCUPATIONAL THERAPY | Facility: REHABILITATION | Age: 79
DRG: 560 | End: 2024-07-25
Attending: PHYSICAL MEDICINE & REHABILITATION
Payer: COMMERCIAL

## 2024-07-25 PROCEDURE — 700102 HCHG RX REV CODE 250 W/ 637 OVERRIDE(OP): Performed by: PHYSICAL MEDICINE & REHABILITATION

## 2024-07-25 PROCEDURE — A9270 NON-COVERED ITEM OR SERVICE: HCPCS | Performed by: PHYSICAL MEDICINE & REHABILITATION

## 2024-07-25 PROCEDURE — 97535 SELF CARE MNGMENT TRAINING: CPT

## 2024-07-25 PROCEDURE — 97530 THERAPEUTIC ACTIVITIES: CPT

## 2024-07-25 PROCEDURE — 700111 HCHG RX REV CODE 636 W/ 250 OVERRIDE (IP): Mod: JZ | Performed by: PHYSICAL MEDICINE & REHABILITATION

## 2024-07-25 PROCEDURE — 99233 SBSQ HOSP IP/OBS HIGH 50: CPT | Performed by: PHYSICAL MEDICINE & REHABILITATION

## 2024-07-25 PROCEDURE — 770010 HCHG ROOM/CARE - REHAB SEMI PRIVAT*

## 2024-07-25 PROCEDURE — 97116 GAIT TRAINING THERAPY: CPT

## 2024-07-25 PROCEDURE — 97110 THERAPEUTIC EXERCISES: CPT

## 2024-07-25 RX ADMIN — ACETAMINOPHEN 1000 MG: 500 TABLET, FILM COATED ORAL at 08:23

## 2024-07-25 RX ADMIN — OXYCODONE HYDROCHLORIDE 5 MG: 5 TABLET ORAL at 05:30

## 2024-07-25 RX ADMIN — SENNOSIDES AND DOCUSATE SODIUM 2 TABLET: 50; 8.6 TABLET ORAL at 20:11

## 2024-07-25 RX ADMIN — ACETAMINOPHEN 1000 MG: 500 TABLET, FILM COATED ORAL at 20:11

## 2024-07-25 RX ADMIN — SENNOSIDES AND DOCUSATE SODIUM 2 TABLET: 50; 8.6 TABLET ORAL at 05:30

## 2024-07-25 RX ADMIN — OXYCODONE HYDROCHLORIDE 5 MG: 5 TABLET ORAL at 20:12

## 2024-07-25 RX ADMIN — OMEPRAZOLE 20 MG: 20 CAPSULE, DELAYED RELEASE ORAL at 08:23

## 2024-07-25 RX ADMIN — ACETAMINOPHEN 1000 MG: 500 TABLET, FILM COATED ORAL at 14:36

## 2024-07-25 RX ADMIN — ENOXAPARIN SODIUM 40 MG: 100 INJECTION SUBCUTANEOUS at 17:06

## 2024-07-25 ASSESSMENT — GAIT ASSESSMENTS
ASSISTIVE DEVICE: PARALLEL BARS
GAIT LEVEL OF ASSIST: MINIMAL ASSIST
DISTANCE (FEET): 6

## 2024-07-25 ASSESSMENT — ACTIVITIES OF DAILY LIVING (ADL)
BED_CHAIR_WHEELCHAIR_TRANSFER_DESCRIPTION: VERBAL CUEING;SUPERVISION FOR SAFETY;INCREASED TIME;INITIAL PREPARATION FOR TASK

## 2024-07-25 NOTE — CARE PLAN
The patient is Stable - Low risk of patient condition declining or worsening    Shift Goals  Clinical Goals: safety  Patient Goals: sleep well, pain control    Progress made toward(s) clinical / shift goals:    Problem: Pain - Standard  Goal: Alleviation of pain or a reduction in pain to the patient’s comfort goal  Outcome: Progressing. Patient denies having any pain over shift. Patient has scheduled tylenol tid as well as prn oxycodone if needed, but declined over shift.      Problem: Fall Risk - Rehab  Goal: Patient will remain free from falls  Outcome: Progressing. Patient bed in lowest locked position with bed alarm on and call light within reach. Patient calls appropriately with call light for needs and has not attempted to self transfer over shift.

## 2024-07-25 NOTE — THERAPY
"Occupational Therapy  Daily Treatment     Patient Name: Gali Broderick  Age:  79 y.o., Sex:  female  Medical Record #: [id number]  Today's Date: 7/25/2024     Precautions  Precautions: Weight Bearing As Tolerated Left Lower Extremity, Non Weight Bearing Right Upper Extremity, Fall Risk, Sling Right Upper Extremity  Comments: Orthostatic hypotension; wear compression garments when OOB, Tubi  on L LE and teds on RLE, abdominial binder         Subjective    \"Can we take a shower?\"     Objective       07/25/24 1452 07/25/24 1523 07/25/24 1528   OT Charge Group   OT Self Care / ADL (Units) 4  --   --    OT Total Time Spent   OT Individual Total Time Spent (Mins) 60  --   --    Vitals   Patient BP Position Sitting  --  Supine   Blood Pressure  96/44 (!) 76/43  (No Jackie hose or binder) 116/44   Functional Level of Assist   Bathing   (not appropriate to shower at this time d/t low BP even with JACKIE hose and binder. Pt did sponge bath of upper body and thighs. Assist to get under the left arm)  --   --    Upper Body Dressing Moderate Assist  --   --    Upper Body Dressing Description   (able to thread R arm into sleeve, needs assist to bring shirt around back and thread L arm)  --   --    Lower Body Dressing Minimal Assist  (able to use reacher to don pants, starting with min A fading to CGA. Able to stand unsupported to pull up pants)  --   --    Toileting   (declined need; however pt stated that she has not been able to clean herself as well as usual)  --   --    Bed, Chair, Wheelchair Transfer Contact Guard Assist  --   --    Bed Mobility    Supine to Sit Supervised  --   --    Sit to Supine Supervised  --   --    Scooting Independent  --   --    Rolling Independent  --   --    Interdisciplinary Plan of Care Collaboration   IDT Collaboration with  Nursing;Family / Caregiver  --   --    Collaboration Comments discussed low reason for BP with nursing.  present during session  --   --      Pt had BP of 96/44 with JACKIE " hose and abdominal binder. JACKIE hose and abdominal binder became wet during sponge bath and were removed prior to subsequent BP readings.     Assessment    Pt limited today by low BP. OT determined to take a sponge bath instead of a shower d/t pt's initial low BP reading of 96/44 with JACKIE hose and abdominal binder on. Pt's BP dropped when binder and JACKIE hose had to be removed but recovered again when she was supine in bed. Per pt's , BP meds were changed (will confirm this with Dr. Hays). Pt may benefit from having JACKIE hose and binder on while taking a shower.   Strengths: Able to follow instructions, Alert and oriented, Effective communication skills, Independent prior level of function, Manages pain appropriately, Motivated for self care and independence, Pleasant and cooperative, Supportive family, Willingly participates in therapeutic activities  Barriers: Decreased endurance, Generalized weakness, Impaired balance, Limited mobility    Plan    Shower (depending on BP)  Practice using AE for dressing, practice adaptive methods for UB dressing  Standing tolerance/balance  Safety with functional transfers      DME  OT DME Recommendations  Bathroom Equipment:  (shower chair)    Passport items to be completed:  Perform bathroom transfers, complete dressing, complete feeding, get ready for the day, prepare a simple meal, participate in household tasks, adapt home for safety needs, demonstrate home exercise program, complete caregiver training     Occupational Therapy Goals (Active)       Problem: Bathing       Dates: Start:  07/25/24         Goal: STG-Within one week, patient will bathe with min assist using LRAD as needed       Dates: Start:  07/25/24               Problem: Dressing       Dates: Start:  07/25/24         Goal: STG-Within one week, patient will dress UB with min assist using LRAD as needed       Dates: Start:  07/25/24            Goal: STG-Within one week, patient will dress LB with min assist  using LRAD as needed       Dates: Start:  07/25/24               Problem: Functional Transfers       Dates: Start:  07/25/24         Goal: STG-Within one week, patient will transfer to toilet at SPV level using LRAD as needed       Dates: Start:  07/25/24               Problem: OT Long Term Goals       Dates: Start:  07/25/24         Goal: LTG-By discharge, patient will complete basic self care tasks at mod I level using LRAD as needed       Dates: Start:  07/25/24            Goal: LTG-By discharge, patient will perform bathroom transfers at mod I level using LRAD as needed       Dates: Start:  07/25/24

## 2024-07-25 NOTE — CARE PLAN
The patient is Stable - Low risk of patient condition declining or worsening    Shift Goals  Clinical Goals: safety  Patient Goals: safety        Problem: Mobility  Goal: Patient's capacity to carry out activities will improve  Outcome: Progressing patient has been up participating in therapies, educated to take rest periods in between to avoid fatigue.     Problem: Pain - Standard  Goal: Alleviation of pain or a reduction in pain to the patient’s comfort goal  Outcome: Progressing Patient able to verbalize pain level and verbalize an acceptable level of pain.

## 2024-07-25 NOTE — PROGRESS NOTES
Patient care assumed. Report received from [facility] CHITRA Barron. Patient is alert and calm, resting in bed. Call light and bedside table within reach. Will continue to monitor.

## 2024-07-25 NOTE — CARE PLAN
Problem: Bathing  Goal: STG-Within one week, patient will bathe with min assist using LRAD as needed  Outcome: Progressing     Problem: Dressing  Goal: STG-Within one week, patient will dress UB with min assist using LRAD as needed  Outcome: Progressing  Goal: STG-Within one week, patient will dress LB with min assist using LRAD as needed  Outcome: Progressing     Problem: Functional Transfers  Goal: STG-Within one week, patient will transfer to toilet at V level using LRAD as needed  Outcome: Progressing     Problem: OT Long Term Goals  Goal: LTG-By discharge, patient will complete basic self care tasks at mod I level using LRAD as needed  Outcome: Progressing  Goal: LTG-By discharge, patient will perform bathroom transfers at mod I level using LRAD as needed  Outcome: Progressing

## 2024-07-25 NOTE — THERAPY
Physical Therapy   Daily Treatment     Patient Name: Gali Broderick  Age:  79 y.o., Sex:  female  Medical Record #: [id number]  Today's Date: 7/25/2024     Precautions  Precautions: Weight Bearing As Tolerated Left Lower Extremity, Non Weight Bearing Right Upper Extremity, Fall Risk, Sling Right Upper Extremity  Comments: (P) Orthostatic hypotension; wear compression garments when oob, Tubi  on L LE and teds on R, abdominial binder    Subjective    Pt is in her bed and agreeable to participate in therapy.      Objective       07/25/24 0831   PT Charge Group   PT Therapeutic Exercise (Units) 3   PT Therapeutic Activities (Units) 1   PT Total Time Spent   PT Individual Total Time Spent (Mins) 60   Precautions   Precautions Weight Bearing As Tolerated Left Lower Extremity;Non Weight Bearing Right Upper Extremity;Fall Risk;Sling Right Upper Extremity   Comments Orthostatic hypotension; wear compression garments when oob, Tubi  on L LE and teds on R, abdominial binder   Vitals   Pulse 89   Patient BP Position Sitting   Blood Pressure  (!) 89/44  (further went down to 77/45 and returned to 111/43 in trendelenburg position and ankle pumps)   Respiration 18   Pulse Oximetry 97 %   O2 Delivery Device None - Room Air   Vitals Comments Pt was dizzy and and nasueaous   Transfer Functional Level of Assist   Bed, Chair, Wheelchair Transfer Minimal Assist   Bed Chair Wheelchair Transfer Description Verbal cueing;Supervision for safety;Increased time;Initial preparation for task   Supine Lower Body Exercise   Hip Abduction Hook Lying;1 set of 10;Bilateral   Straight Leg Raises Front;1 set of 10;Bilateral   Short Arc Quad 1 set of 10;Left   Heel Slide 1 set of 10;Bilateral   Ankle Pumps Reciprocal;2 sets of 10   Gluteal Isometrics 1 set of 10;Bilateral   Quadriceps Isometrics 1 set of 10;Bilateral   Sitting Lower Body Exercises   Ankle Pumps 2 sets of 10;Bilateral   Hip Flexion 2 sets of 10;Bilateral   Hip Abduction 2 sets of  10;Bilateral   Long Arc Quad 2 sets of 10;Bilateral   Marching Reciprocal;2 sets of 10   Bed Mobility    Supine to Sit Supervised   Sit to Supine Supervised   Sit to Stand Contact Guard Assist   Interdisciplinary Plan of Care Collaboration   IDT Collaboration with  Family / Caregiver;Nursing;Physician   Patient Position at End of Therapy In Bed;Bed Alarm On;Call Light within Reach;Tray Table within Reach;Phone within Reach;Family / Friend in Room   Collaboration Comments  is present during session; inform re OH         Assessment    Patient instructed in stand pivot transfer bed<>wc using jelani walker, Min A with cueing for sequencing and techniques. She performed seated thera ex as above and in the last 2 sets of exercises, she reported dizziness. Her BP was low and this PT lower down her wc for few minutes. He noted improvement in her dizziness. Pt's BP was still low and transferred back to her bed in trendelenburg position. Pt's BP and dizziness improved. She completed supine thera ex. PT discussed OH to nursing and md. Recommended compression garments and Tubi  for L LE.     Strengths: Able to follow instructions, Alert and oriented, Effective communication skills, Independent prior level of function, Motivated for self care and independence, Pleasant and cooperative, Willingly participates in therapeutic activities, Supportive family  Barriers: Decreased endurance, Fatigue, Generalized weakness, Impaired activity tolerance, Impaired balance, Limited mobility, Pain, Impulsive    Plan    Progressing to gait with jelani walker  Strengthening in all planes  Balance  Transfer safety  Car transfers and stairs as able       DME  PT DME Recommendations  Assistive Device: Jelani Walker    Passport items to be completed:  Get in/out of bed safely, in/out of a vehicle, safely use mobility device, walk or wheel around home/community, navigate up and down stairs, show how to get up/down from the ground, ensure home is  accessible, demonstrate HEP, complete caregiver training    Physical Therapy Problems (Active)       Problem: Balance       Dates: Start:  07/24/24         Goal: STG-Within one week, patient will maintain dynamic standing during AP and lateral weight shifts on firm surface for one min with contact guard assist.        Dates: Start:  07/24/24            Goal: STG-Within one week, patient will tolerate caputo balance test.       Dates: Start:  07/24/24               Problem: Mobility       Dates: Start:  07/24/24         Goal: STG-Within one week, patient will ambulate 10 ft using hemiwalker with contact guard assist.        Dates: Start:  07/24/24            Goal: STG-Within one week, patient will ambulate up/down a curb using hemiwalker and contact guard assist.        Dates: Start:  07/24/24               Problem: Mobility Transfers       Dates: Start:  07/24/24         Goal: STG-Within one week, patient will transfer bed to chair with stand pivot transfer safely with supervision assist.        Dates: Start:  07/24/24               Problem: PT-Long Term Goals       Dates: Start:  07/24/24         Goal: LTG-By discharge, patient will receive >35/56 on caputo balance test.        Dates: Start:  07/24/24            Goal: LTG-By discharge, patient will ambulate 100 ft using hemiwalker or LRAD with supervision assist.        Dates: Start:  07/24/24            Goal: LTG-By discharge, patient will perform home exercise program using handouts independently.        Dates: Start:  07/24/24            Goal: LTG-By discharge, patient will transfer in/out of a car using hemiwalker with supervision assist.        Dates: Start:  07/24/24

## 2024-07-25 NOTE — THERAPY
Physical Therapy   Daily Treatment     Patient Name: Gali Broderick  Age:  79 y.o., Sex:  female  Medical Record #: [id number]  Today's Date: 7/25/2024     Precautions  Precautions: (P) Weight Bearing As Tolerated Left Lower Extremity, Non Weight Bearing Right Upper Extremity, Fall Risk, Sling Right Upper Extremity  Comments: (P) Orthostatic hypotension; wear compression garments when OOB, Tubi  on L LE and teds on RLE, abdominial binder    Subjective    Pt is agreeable to participate in therapy.      Objective       07/25/24 1401   PT Charge Group   PT Gait Training (Units) 1   PT Therapeutic Activities (Units) 1   Precautions   Precautions Weight Bearing As Tolerated Left Lower Extremity;Non Weight Bearing Right Upper Extremity;Fall Risk;Sling Right Upper Extremity   Comments Orthostatic hypotension; wear compression garments when OOB, Tubi  on L LE and teds on RLE, abdominial binder   Vitals   Pulse 97   Patient BP Position Sitting   Blood Pressure  97/63  (post gait training 107/55)   Respiration 18   Pulse Oximetry 98 %   O2 Delivery Device None - Room Air   Vitals Comments slight lightheadedness but resolved in sitting and hydration.   Pain   Intervention Declines   Pain 0 - 10 Group   Location Knee;Leg   Location Orientation Left   Pain Rating Scale (NPRS) 8   Description Aching   Comfort Goal Comfort with Movement;Perform Activity   Gait Functional Level of Assist    Gait Level Of Assist Minimal Assist   Assistive Device Parallel Bars   Distance (Feet) 6  (with 2 seated rest breaks)   # of Times Distance was Traveled 1   Deviation Antalgic;Decreased Base Of Support;Decreased Heel Strike;Decreased Toe Off;Step To   Transfer Functional Level of Assist   Bed, Chair, Wheelchair Transfer Minimal Assist   Bed Chair Wheelchair Transfer Description Verbal cueing;Supervision for safety;Increased time;Adaptive equipment;Initial preparation for task   Interdisciplinary Plan of Care Collaboration   IDT  Collaboration with  Family / Caregiver   Patient Position at End of Therapy Seated;Chair Alarm On;Call Light within Reach;Tray Table within Reach;Phone within Reach;Family / Friend in Room;Self Releasing Lap Belt Applied   Collaboration Comments  is present during session         Assessment    Patient instructed in multiple sit to  // bars, CGA-Min A with cueing for slow and eccentric control of LE when seating down. Gait training in //  bars, Min A with cueing for sequencing and weight shifting in L UE/ bar. Pt had difficulty advancing her R LE and was afraid . She had seated rest break after each 2 steps and were shaky. Patient instructed to pre medicate 1 hour before her PT session to minimize pain.   Strengths: Able to follow instructions, Alert and oriented, Effective communication skills, Independent prior level of function, Motivated for self care and independence, Pleasant and cooperative, Willingly participates in therapeutic activities, Supportive family  Barriers: Decreased endurance, Fatigue, Generalized weakness, Impaired activity tolerance, Impaired balance, Limited mobility, Pain, Impulsive    Plan    Progressing to gait with jelani walker  Strengthening in all planes  Balance  Transfer safety  Car transfers and stairs as able    DME  PT DME Recommendations  Assistive Device: Jelani Walker    Passport items to be completed:  Get in/out of bed safely, in/out of a vehicle, safely use mobility device, walk or wheel around home/community, navigate up and down stairs, show how to get up/down from the ground, ensure home is accessible, demonstrate HEP, complete caregiver training    Physical Therapy Problems (Active)       Problem: Balance       Dates: Start:  07/24/24         Goal: STG-Within one week, patient will maintain dynamic standing during AP and lateral weight shifts on firm surface for one min with contact guard assist.        Dates: Start:  07/24/24            Goal: STG-Within one  week, patient will tolerate caputo balance test.       Dates: Start:  07/24/24               Problem: Mobility       Dates: Start:  07/24/24         Goal: STG-Within one week, patient will ambulate 10 ft using hemiwalker with contact guard assist.        Dates: Start:  07/24/24            Goal: STG-Within one week, patient will ambulate up/down a curb using hemiwalker and contact guard assist.        Dates: Start:  07/24/24               Problem: Mobility Transfers       Dates: Start:  07/24/24         Goal: STG-Within one week, patient will transfer bed to chair with stand pivot transfer safely with supervision assist.        Dates: Start:  07/24/24               Problem: PT-Long Term Goals       Dates: Start:  07/24/24         Goal: LTG-By discharge, patient will receive >35/56 on caputo balance test.        Dates: Start:  07/24/24            Goal: LTG-By discharge, patient will ambulate 100 ft using hemiwalker or LRAD with supervision assist.        Dates: Start:  07/24/24            Goal: LTG-By discharge, patient will perform home exercise program using handouts independently.        Dates: Start:  07/24/24            Goal: LTG-By discharge, patient will transfer in/out of a car using hemiwalker with supervision assist.        Dates: Start:  07/24/24

## 2024-07-25 NOTE — THERAPY
Occupational Therapy  Daily Treatment     Patient Name: Gali Broderick  Age:  79 y.o., Sex:  female  Medical Record #: [id number]  Today's Date: 7/25/2024     Precautions  Precautions: (P) Weight Bearing As Tolerated Left Lower Extremity, Non Weight Bearing Right Upper Extremity, Fall Risk, Sling Right Upper Extremity  Comments: (P) Orthostatic hypotension; wear compression garments when OOB, Tubi  on L LE and teds on RLE, abdominial binder    Subjective    Pt in bed upon arrival w/ spouse Jerome present. Agreeable to participate in OT.      Objective     07/25/24 1101   OT Charge Group   OT Self Care / ADL (Units) 2   OT Total Time Spent   OT Individual Total Time Spent (Mins) 30   Precautions   Precautions Weight Bearing As Tolerated Left Lower Extremity;Non Weight Bearing Right Upper Extremity;Fall Risk;Sling Right Upper Extremity   Comments Orthostatic hypotension; wear compression garments when OOB, Tubi  on L LE and teds on RLE, abdominial binder   Cognition    Level of Consciousness Alert   Sleep/Wake Cycle   Sleep & Rest Awake   Functional Level of Assist   Lower Body Dressing Minimal Assist  (Min A to don elastic waist shorts w/ AE, assist provided for standing balance/safety during shorts over hips pursuit)   Bed, Chair, Wheelchair Transfer Minimal Assist  (bed > w/c to L side)   Interdisciplinary Plan of Care Collaboration   IDT Collaboration with  Family / Caregiver   Patient Position at End of Therapy Seated;Self Releasing Lap Belt Applied;Family / Friend in Room   Collaboration Comments Spouse (Jerome) present during OT session     Therapist assisted pt w/ donning R knee high compression sock and L tubi  + abdominal binder for BP management     Assessment    Pt tolerated OT session well w/ focus on ADL re-training. Pt demo's improved functional independence w/ LB dressing tasks since initial eval yesterday and adequately incorporated AE to maximize safety/ independence. Additional practice  recommended to maximize carryover of strategies. Utilized kari walker during shorts over hips pursuit in standing.   Strengths: Able to follow instructions, Alert and oriented, Effective communication skills, Independent prior level of function, Manages pain appropriately, Motivated for self care and independence, Pleasant and cooperative, Supportive family, Willingly participates in therapeutic activities  Barriers: Decreased endurance, Generalized weakness, Impaired balance, Limited mobility    Plan    R shoulder pendulums, ADLs w/ AE as needed, functional txfrs w/ LRAD, IADLs w/ standing as tolerated, standing balance/endurance     DME  OT DME Recommendations  Bathroom Equipment:  (shower chair)    Passport items to be completed:  Perform bathroom transfers, complete dressing, complete feeding, get ready for the day, prepare a simple meal, participate in household tasks, adapt home for safety needs, demonstrate home exercise program, complete caregiver training     Occupational Therapy Goals (Active)       Problem: Bathing       Dates: Start:  07/25/24         Goal: STG-Within one week, patient will bathe with min assist using LRAD as needed       Dates: Start:  07/25/24               Problem: Dressing       Dates: Start:  07/25/24         Goal: STG-Within one week, patient will dress UB with min assist using LRAD as needed       Dates: Start:  07/25/24            Goal: STG-Within one week, patient will dress LB with min assist using LRAD as needed       Dates: Start:  07/25/24               Problem: Functional Transfers       Dates: Start:  07/25/24         Goal: STG-Within one week, patient will transfer to toilet at SPV level using LRAD as needed       Dates: Start:  07/25/24               Problem: OT Long Term Goals       Dates: Start:  07/25/24         Goal: LTG-By discharge, patient will complete basic self care tasks at mod I level using LRAD as needed       Dates: Start:  07/25/24            Goal: LTG-By  discharge, patient will perform bathroom transfers at mod I level using LRAD as needed       Dates: Start:  07/25/24

## 2024-07-25 NOTE — CARE PLAN
Problem: Balance  Goal: STG-Within one week, patient will maintain dynamic standing during AP and lateral weight shifts on firm surface for one min with contact guard assist.   Outcome: Not Met  Goal: STG-Within one week, patient will tolerate caputo balance test.  Outcome: Not Met     Problem: Mobility  Goal: STG-Within one week, patient will ambulate 10 ft using hemiwalker with contact guard assist.   Outcome: Not Met  Goal: STG-Within one week, patient will ambulate up/down a curb using hemiwalker and contact guard assist.   Outcome: Not Met     Problem: Mobility Transfers  Goal: STG-Within one week, patient will transfer bed to chair with stand pivot transfer safely with supervision assist.   Outcome: Not Met     Problem: PT-Long Term Goals  Goal: LTG-By discharge, patient will receive >35/56 on caputo balance test.   Outcome: Not Met  Goal: LTG-By discharge, patient will ambulate 100 ft using hemiwalker or LRAD with supervision assist.   Outcome: Not Met  Goal: LTG-By discharge, patient will perform home exercise program using handouts independently.   Outcome: Not Met  Goal: LTG-By discharge, patient will transfer in/out of a car using hemiwalker with supervision assist.   Outcome: Not Met

## 2024-07-25 NOTE — THERAPY
"Occupational Therapy   Initial Evaluation     Patient Name: Gali Broderick  Age:  79 y.o., Sex:  female  Medical Record #: [id number]  Today's Date: 7/25/2024     Subjective    \"I need to be able to walk\"     Objective       07/24/24 0831 07/24/24 1401   OT Charge Group   Charges Yes  --    OT Self Care / ADL (Units) 1  --    OT Therapy Activity (Units)  --  2   OT Evaluation OT Evaluation Low  --    OT Total Time Spent   OT Individual Total Time Spent (Mins) 60 30   Prior Living Situation   Prior Services None  --    Housing / Facility 1 Story House  --    Steps Into Home 1  --    Elevator No  --    Bathroom Set up Walk In Shower  (no grab bars but has ability to install)  --    Equipment Owned Wheelchair  --    Lives with - Patient's Self Care Capacity Spouse  --    Comments Pt has family living nearby to assist if needed  --    Prior Level of ADL Function   Self Feeding Independent  --    Grooming / Hygiene Independent  --    Bathing Independent  --    Dressing Independent  --    Toileting Independent  --    Prior Level of IADL Function   Medication Management Independent  --    Laundry Independent  --    Kitchen Mobility Independent  --    Finances Independent  --    Home Management Independent  --    Shopping Independent  --    Prior Level Of Mobility Independent Without Device in Community  --    Driving / Transportation Unable To Determine At This Time  --    Occupation (Pre-Hospital Vocational) Retired Due To Age  --    Leisure Interests Exercise;Sports;Gardening;Hobbies;Family  (was hiking when she was injured)  --    Prior Functioning: Everyday Activities   Self Care Independent  --    Indoor Mobility (Ambulation) Independent  --    Stairs Independent  --    Functional Cognition Independent  --    Prior Device Use None of the given options  --    Pain 0 - 10 Group   Location Arm;Knee  --    Pain Rating Scale (NPRS) 2  --    Cognitive Pattern Assessment   Cognitive Pattern Assessment Used BIMS  --    Brief " "Interview for Mental Status (BIMS)   Repetition of Three Words (First Attempt) 3  --    Temporal Orientation: Year Correct  --    Temporal Orientation: Month Accurate within 5 days  --    Temporal Orientation: Day Incorrect  --    Recall: \"Sock\" Yes, no cue required  --    Recall: \"Blue\" Yes, no cue required  --    Recall: \"Bed\" Yes, no cue required  --    BIMS Summary Score 14  --    Confusion Assessment Method (CAM)   Is there evidence of an acute change in mental status from the patient's baseline? No  --    Inattention Behavior not present  --    Disorganized thinking Behavior not present  --    Altered level of consciousness Behavior not present  --    Passive ROM Upper Body   Passive ROM Upper Body X  --    Comments RUE in sling, pain with slight passive flexion when getting dressed  --    Active ROM Upper Body   Active ROM Upper Body  X  --    Dominant Hand Right  --    Comments sling RUE  --    Strength Upper Body   Upper Body Strength  X  --    Comments NWB RLE, difficulty opening toothbrush case with right hand  --    Sensation Upper Body   Upper Extremity Sensation  Not Tested  --    Upper Body Muscle Tone   Upper Body Muscle Tone  WDL  --    Balance Assessment   Sitting Balance (Static) Good  --    Sitting Balance (Dynamic) Good  --    Standing Balance (Static) Poor  --    Standing Balance (Dynamic) Poor  --    Weight Shift Sitting Poor  --    Weight Shift Standing Poor  --    Bed Mobility    Supine to Sit Contact Guard Assist  --    Sit to Stand Contact Guard Assist  --    Hearing, Speech, and Vision   Ability to Hear Adequate  --    Ability to See in Adequate Light Adequate  --    Expression of Ideas and Wants Without difficulty  --    Understanding Verbal and Non-Verbal Content Understands  --    Functional Level of Assist   Grooming Standby Assist;Seated  --    Grooming Description Set-up of equipment  --    Bathing   (not tested at this time; pt would be mod assist)  --    Upper Body Dressing " Maximal Assist  --    Upper Body Dressing Description Assit with threading arms through sleeves;Increased time;Assist with closures;Supervision for safety;Set-up of equipment;Verbal cueing;Initial preparation for task  (button up shirt)  --    Lower Body Dressing Maximal Assist   (see comments)   Lower Body Dressing Description Assist with threading into pant leg;Increased time;Initial preparation for task;Grab bar  (assist with pants over hips)  --    Toileting Contact Guard Assist  --    Toileting Description Grab bar  --    Bed, Chair, Wheelchair Transfer Contact Guard Assist Minimal Assist   Bed Chair Wheelchair Transfer Description Set-up of equipment;Initial preparation for task Set-up of equipment;Supervision for safety;Verbal cueing   Toilet Transfers Contact Guard Assist  --    Toilet Transfer Description Grab bar;Set-up of equipment;Initial preparation for task;Supervision for safety  --    Tub / Shower Transfers   (not tested)  --    Eating   Assistance Needed Set-up / clean-up  --    CARE Score - Eating 5  --    Eating Discharge Goal   Discharge Goal 6  --    Oral Hygiene   Assistance Needed Set-up / clean-up  --    CARE Score - Oral Hygiene 5  --    Oral Hygiene Discharge Goal   Discharge Goal 6  --    Shower/Bathe Self   Assistance Needed Physical assistance  --    Physical Assistance Level 51%-75%  --    CARE Score - Shower/Bathe Self 2  --    Shower/Bathe Self Discharge Goal   Discharge Goal 4  --    Upper Body Dressing   Assistance Needed Physical assistance  --    Physical Assistance Level 51%-75%  --    CARE Score - Upper Body Dressing 2  --    Upper Body Dressing Discharge Goal   Discharge Goal 5  --    Lower Body Dressing   Assistance Needed Physical assistance  --    Physical Assistance Level 51%-75%  --    CARE Score - Lower Body Dressing 2  --    Lower Body Dressing Discharge Goal   Discharge Goal 4  --    Putting On/Taking Off Footwear   Assistance Needed Physical assistance  --    Physical  Assistance Level 51%-75%  --    CARE Score - Putting On/Taking Off Footwear 2  --    Putting On/Taking Off Footwear Discharge Goal   Discharge Goal 4  --    Toileting Hygiene   Assistance Needed Supervision  --    CARE Score - Toileting Hygiene 4  --    Toileting Hygiene Discharge Goal   Discharge Goal 6  --    Toilet Transfer   Assistance Needed Incidental touching  --    CARE Score - Toilet Transfer 4  --    Toilet Transfer Discharge Goal   Discharge Goal 6  --    Problem List   Problem List Decreased Active Daily Living Skills;Decreased Homemaking Skills;Decreased Upper Extremity Strength Right;Decreased Upper Extremity AROM Right;Decreased Upper Extremity PROM Right;Decreased Functional Mobility;Decreased Activity Tolerance  --    Precautions   Precautions Weight Bearing As Tolerated Left Lower Extremity;Non Weight Bearing Right Upper Extremity;Sling Right Upper Extremity  --    Comments pendulums as tolerated  --    Current Discharge Plan   Current Discharge Plan Return to Prior Living Situation  --    Benefit    Therapy Benefit Patient Would Benefit from Inpatient Rehab Occupational Therapy to Maximize [place] with ADLs, IADLs and Functional Mobility.  --    Interdisciplinary Plan of Care Collaboration   IDT Collaboration with  Nursing  --    Patient Position at End of Therapy Seated;Family / Friend in Room  --    Collaboration Comments nursing giving tylenol during OT session  --    OT DME Recommendations   Bathroom Equipment   (shower chair)  --    Strengths & Barriers   Strengths Able to follow instructions;Alert and oriented;Effective communication skills;Independent prior level of function;Manages pain appropriately;Motivated for self care and independence;Pleasant and cooperative;Supportive family;Willingly participates in therapeutic activities  --    Barriers Decreased endurance;Generalized weakness;Impaired balance;Limited mobility  --    Occupational Therapist Assigned   Assigned OT / Treatment  Time / Comments NT, 60-90  --        Comments: During PM session, OT demonstrated how to use sock aid, reacher, and dressing stick for lower body dressing. Pt practiced using AE for donning/doffing socks.      Assessment  Patient is 79 y.o. female with a diagnosis of left tibial shaft fracture and right threepart proximal humerus fracture. She was taken to the OR by Dr. Ramírez Denney MD on 7/16 for ORIF left tibal shaft with IM nail and closed treatment of right humerus fracture. Postoperatively she is WBAT LLE and NWB with sling for RUE. Current labs reflect anemia with hgb 9.9     Patient has continued anemia which may be related to her left leg pain and swelling, there is concern for an intramuscular hematoma vs seroma. This would not be operative but would require daily labs for observation. She is also have orthostatic hypotension related to her anemia, she reports feeling hot, nauseas and light headed with standing. She had emesis once. 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     Pt is currently below PLOF, which is independent with all ADLs and IADLs. Pt currently needs max-CGA for ADLs and transfers.    Additional factors influencing patient status / progress (ie: cognitive factors, co-morbidities, social support, etc): Pt is active at baseline, has a very supportive community of friends and family, and is motivated for independence.      Plan  Recommend Occupational Therapy  minutes per day 5-7 days per week for 7-10 days for the following treatments:  OT Self Care/ADL, OT Manual Ther Technique, OT Neuro Re-Ed/Balance, OT Therapeutic Activity, OT Evaluation, and OT Therapeutic Exercise.    Passport items to be completed:  Perform bathroom transfers, complete dressing, complete feeding, get ready for the day, prepare a simple meal, participate in household tasks, adapt home for safety needs, demonstrate home exercise program, complete caregiver training     Goals:       Occupational Therapy Goals (Active)       Problem: Bathing       Dates: Start:  07/25/24         Goal: STG-Within one week, patient will bathe with min assist using LRAD as needed       Dates: Start:  07/25/24               Problem: Dressing       Dates: Start:  07/25/24         Goal: STG-Within one week, patient will dress UB with min assist using LRAD as needed       Dates: Start:  07/25/24            Goal: STG-Within one week, patient will dress LB with min assist using LRAD as needed       Dates: Start:  07/25/24               Problem: Functional Transfers       Dates: Start:  07/25/24         Goal: STG-Within one week, patient will transfer to toilet at SPV level using LRAD as needed       Dates: Start:  07/25/24               Problem: OT Long Term Goals       Dates: Start:  07/25/24         Goal: LTG-By discharge, patient will complete basic self care tasks at mod I level using LRAD as needed       Dates: Start:  07/25/24            Goal: LTG-By discharge, patient will perform bathroom transfers at mod I level using LRAD as needed       Dates: Start:  07/25/24

## 2024-07-25 NOTE — PROGRESS NOTES
NURSING DAILY NOTE    Name: Gali Broderick  Date of Admission: 7/23/2024  Admitting Diagnosis: Multiple trauma  Attending Physician: Derik Hays M.d.  Allergies: Iodine    Safety  Patient Assist  omoderate assist  Patient Precautions  oWeight Bearing As Tolerated Left Lower Extremity, Non Weight Bearing Right Upper Extremity, Fall Risk, Sling Right Upper Extremity  Precaution Comments  opendulums as tolerated  Bed Transfer Status  oMinimal Assist  Toilet Transfer Status  oContact Guard Assist  Assistive Devices  oWheelchair  Oxygen  oNone - Room Air  Diet/Therapeutic Dining  o  Current Diet Order   Procedures    Diet Order Diet: Regular     Pill Administration  owhole  Agitated Behavioral Scale  o   ABS Level of Severity  o     Fall Risk  Has the patient had a fall this admission?  Edmar  Fifi Evangelista Fall Risk Scoring  o18, HIGH RISK  Fall Risk Safety Measures  rashid alarm and chair alarm    Vitals  Temperature: 36.6 °C (97.8 °F)  Temp src: Oral  Pulse: 62  Respiration: 18  Blood Pressure : 126/53  Blood Pressure MAP (Calculated): 77 MM HG  BP Location: Left, Upper Arm  Patient BP Position: Sitting    Oxygen  Pulse Oximetry: 95 %  O2 (LPM): 0  O2 Delivery Device: None - Room Air    Bowel and Bladder  Last Bowel Movement  o07/22/24 (refused scheduled senna hs)  Stool Type  o   Bowel Device  oBathroom  Continent  oBladder: Did not void  oBowel: No movement  Bladder Function  oUrine Color: Unable To Evaluate  Genitourinary Assessment  oBladder Assessment (WDL):  WDL Except  Ariza Catheter: Not Applicable  Urine Color: Unable To Evaluate  Bladder Device: Bathroom  Bladder Scan: Post Void    Skin  Sascha Score  o 16  Sensory Interventions  o    Moisture Interventions  o       Pain  Pain Rating Scale  o7 - Focus of attention, prevents doing daily activities  Pain Location  oArm, Knee  Pain Location Orientation  oRight, Left  Pain Interventions  oMedication (see MAR)    ADLs    Bathing  o   Linen Change  o   Personal  Hygiene  oMoist Estefany Wipes  Chlorhexidine Bath  o   Oral Care  o   Teeth/Dentures  o   Shave  o   Nutrition Percentage Eaten  o   Environmental Precautions  o   Patient Turns/Positioning  oPatient Turns Self from Side to Side  Patient Turns Assistance/Tolerance  o   Bed Positions  Roxy Controls On  Head of Bed Elevated  oSelf regulated      Psychosocial/Neurologic Assessment  Psychosocial Assessment  oPsychosocial (WDL):  Within Defined Limits  Neurologic Assessment  oNeuro (WDL): Exceptions to WDL  Level of Consciousness: Alert  Orientation Level: Oriented X4  Cognition: Appropriate judgement, Follows commands  Speech: Clear  Motor Function/Sensation Assessment: Sensation  RUE Sensation: Tingling, Pain  LLE Sensation: Tingling, Pain  oEENT (WDL):  Within Defined Limits    Cardio/Pulmonary Assessment  Edema  oRUE Edema: Generalized  LLE Edema: Generalized  Respiratory Breath Sounds  oRUL Breath Sounds: Clear  RML Breath Sounds: Clear  RLL Breath Sounds: Clear  BERTA Breath Sounds: Clear  LLL Breath Sounds: Clear  Cardiac Assessment  oCardiac (WDL):  WDL Except

## 2024-07-25 NOTE — PROGRESS NOTES
NURSING DAILY NOTE    Name: Gali Broderick   Date of Admission: 7/23/2024   Admitting Diagnosis: Multiple trauma  Attending Physician: Derik Hays M.d.  Allergies: Iodine    Safety  Patient Assist  min/mod  Patient Precautions  Weight Bearing As Tolerated Left Lower Extremity, Non Weight Bearing Right Upper Extremity, Fall Risk, Sling Right Upper Extremity  Precaution Comments  pendulums as tolerated  Bed Transfer Status  Minimal Assist  Toilet Transfer Status   Contact Guard Assist  Assistive Devices  Wheelchair  Oxygen  None - Room Air  Diet/Therapeutic Dining  Current Diet Order   Procedures    Diet Order Diet: Regular     Pill Administration  whole  Agitated Behavioral Scale     ABS Level of Severity       Fall Risk  Has the patient had a fall this admission?   No  Fifi Evangelista Fall Risk Scoring  15, HIGH RISK  Fall Risk Safety Measures  bed alarm and chair alarm    Vitals  Temperature: 37 °C (98.6 °F)  Temp src: Oral  Pulse: 76  Respiration: 18  Blood Pressure : 121/54  Blood Pressure MAP (Calculated): 76 MM HG  BP Location: Right, Upper Arm  Patient BP Position: Sitting     Oxygen  Pulse Oximetry: 96 %  O2 (LPM): 0  O2 Delivery Device: None - Room Air    Bowel and Bladder  Last Bowel Movement  07/22/24 (refused scheduled stool softners tonight)  Stool Type     Bowel Device  Bathroom  Continent  Bladder: Did not void   Bowel: No movement  Bladder Function  Urine Color: Unable To Evaluate  Genitourinary Assessment   Bladder Assessment (WDL):  WDL Except  Ariza Catheter: Not Applicable  Urine Color: Unable To Evaluate  Bladder Device: Bathroom  Bladder Scan: Post Void    Skin  Sascha Score   16  Sensory Interventions      Moisture Interventions         Pain  Pain Rating Scale  7 - Focus of attention, prevents doing daily activities  Pain Location  Arm, Knee  Pain Location Orientation  Right, Left  Pain Interventions   Declines    ADLs    Bathing       Linen Change      Personal Hygiene  Moist Estefany Wipes  Chlorhexidine Bath      Oral Care     Teeth/Dentures     Shave     Nutrition Percentage Eaten     Environmental Precautions     Patient Turns/Positioning  Patient Turns Self from Side to Side  Patient Turns Assistance/Tolerance     Bed Positions  Bed Controls On  Head of Bed Elevated  Self regulated      Psychosocial/Neurologic Assessment  Psychosocial Assessment  Psychosocial (WDL):  Within Defined Limits  Neurologic Assessment  Neuro (WDL): Exceptions to WDL  Level of Consciousness: Alert  Orientation Level: Oriented X4  Cognition: Appropriate judgement, Follows commands  Speech: Clear  Motor Function/Sensation Assessment: Sensation  RUE Sensation: Tingling, Pain  LLE Sensation: Tingling, Pain  EENT (WDL):  Within Defined Limits    Cardio/Pulmonary Assessment  Edema   RUE Edema: Generalized  LLE Edema: Generalized  Respiratory Breath Sounds  RUL Breath Sounds: Clear  RML Breath Sounds: Clear  RLL Breath Sounds: Clear  BERTA Breath Sounds: Clear  LLL Breath Sounds: Clear  Cardiac Assessment   Cardiac (WDL):  WDL Except

## 2024-07-25 NOTE — PROGRESS NOTES
"  Physical Medicine & Rehabilitation Progress Note  _____________________________________  Interdisciplinary Team Conference   Most recent IDT on 7/25/2024    IDerik M.D., was present and led the interdisciplinary team conference on 7/25/2024.  I led the IDT conference and agree with the IDT conference documentation and plan of care as noted below.     Nursing:  Diet Current Diet Order   Procedures    Diet Order Diet: Regular       Eating ADL        % of Last Meal  Oral Nutrition: Lunch, Between 50-75% Consumed   Sleep    Bowel Last BM: 07/22/24 (refused scheduled senna hs)   Bladder    Barriers to Discharge Home:weakness, pain, orthostatics      Physical Therapy:  Bed Mobility    Transfers Minimal Assist (bed > w/c to L side)  Verbal cueing, Supervision for safety, Increased time, Initial preparation for task   Mobility Contact Guard Assist   Stairs    Barriers to Discharge Home:weakness, pain, orthostatics      Occupational Therapy:  Grooming Standby Assist, Seated   Bathing  (not tested at this time; pt would be mod assist)   UB Dressing Maximal Assist   LB Dressing Minimal Assist (Min A to don elastic waist shorts w/ AE, assist provided for standing balance/safety during shorts over hips pursuit)   Toileting Contact Guard Assist   Shower & Transfer    Barriers to Discharge Home:weakness, pain, orthostatics      Respiratory Therapy:  O2 (LPM): 0  O2 Delivery Device: None - Room Air    Case Management:  Continues to work on disposition and DME needs.      Discharge Date/Disposition:  8/5/24  _____________________________________   Encounter Date: 7/25/2024    Chief Complaint: Weakness    Interval Events (Subjective):  Orthostatic in therapy this am. Stopped bp pill. Tolerating therapy    Objective:  VITAL SIGNS: /48   Pulse 84   Temp 36.3 °C (97.4 °F) (Oral)   Resp 18   Ht 1.626 m (5' 4\")   Wt 76.7 kg (169 lb)   SpO2 97%   BMI 29.01 kg/m²   Gen: No acute distress, well developed well " nourished adult  HEENT: Normal Cephalic Atraumatic, Normal conjunctiva.   CV: warm extremities, well perfused, no edema  Resp: symmetric chest rise, breathing comfortably on room air  Abd: Soft, Non distended  Extremities: normal bulk, no atrophy  Skin: no visible rashes or lesions.   Neuro: alert, awake  Psych: Mood and affect appropriate and congruent    Laboratory Values:  Recent Results (from the past 72 hour(s))   FERRITIN    Collection Time: 07/22/24  3:39 PM   Result Value Ref Range    Ferritin 217.0 10.0 - 291.0 ng/mL   IRON/TOTAL IRON BIND    Collection Time: 07/22/24  3:39 PM   Result Value Ref Range    Iron 49 40 - 170 ug/dL    Total Iron Binding 247 (L) 250 - 450 ug/dL    Unsat Iron Binding 198 110 - 370 ug/dL    % Saturation 20 15 - 55 %   CBC with Differential    Collection Time: 07/24/24  6:05 AM   Result Value Ref Range    WBC 7.1 4.8 - 10.8 K/uL    RBC 3.23 (L) 4.20 - 5.40 M/uL    Hemoglobin 10.0 (L) 12.0 - 16.0 g/dL    Hematocrit 29.9 (L) 37.0 - 47.0 %    MCV 92.6 81.4 - 97.8 fL    MCH 31.0 27.0 - 33.0 pg    MCHC 33.4 32.2 - 35.5 g/dL    RDW 44.5 35.9 - 50.0 fL    Platelet Count 230 164 - 446 K/uL    MPV 10.5 9.0 - 12.9 fL    Neutrophils-Polys 66.70 44.00 - 72.00 %    Lymphocytes 20.10 (L) 22.00 - 41.00 %    Monocytes 8.20 0.00 - 13.40 %    Eosinophils 3.50 0.00 - 6.90 %    Basophils 0.80 0.00 - 1.80 %    Immature Granulocytes 0.70 0.00 - 0.90 %    Nucleated RBC 0.00 0.00 - 0.20 /100 WBC    Neutrophils (Absolute) 4.70 1.82 - 7.42 K/uL    Lymphs (Absolute) 1.42 1.00 - 4.80 K/uL    Monos (Absolute) 0.58 0.00 - 0.85 K/uL    Eos (Absolute) 0.25 0.00 - 0.51 K/uL    Baso (Absolute) 0.06 0.00 - 0.12 K/uL    Immature Granulocytes (abs) 0.05 0.00 - 0.11 K/uL    NRBC (Absolute) 0.00 K/uL   Comp Metabolic Panel (CMP)    Collection Time: 07/24/24  6:05 AM   Result Value Ref Range    Sodium 137 135 - 145 mmol/L    Potassium 4.1 3.6 - 5.5 mmol/L    Chloride 103 96 - 112 mmol/L    Co2 24 20 - 33 mmol/L    Anion  Gap 10.0 7.0 - 16.0    Glucose 108 (H) 65 - 99 mg/dL    Bun 28 (H) 8 - 22 mg/dL    Creatinine 0.73 0.50 - 1.40 mg/dL    Calcium 8.8 8.5 - 10.5 mg/dL    Correct Calcium 9.4 8.5 - 10.5 mg/dL    AST(SGOT) 30 12 - 45 U/L    ALT(SGPT) 37 2 - 50 U/L    Alkaline Phosphatase 201 (H) 30 - 99 U/L    Total Bilirubin 0.5 0.1 - 1.5 mg/dL    Albumin 3.3 3.2 - 4.9 g/dL    Total Protein 6.3 6.0 - 8.2 g/dL    Globulin 3.0 1.9 - 3.5 g/dL    A-G Ratio 1.1 g/dL   HEMOGLOBIN A1C    Collection Time: 07/24/24  6:05 AM   Result Value Ref Range    Glycohemoglobin 4.9 4.0 - 5.6 %    Est Avg Glucose 94 mg/dL   Magnesium    Collection Time: 07/24/24  6:05 AM   Result Value Ref Range    Magnesium 2.3 1.5 - 2.5 mg/dL   ESTIMATED GFR    Collection Time: 07/24/24  6:05 AM   Result Value Ref Range    GFR (CKD-EPI) 83 >60 mL/min/1.73 m 2       Medications:  Scheduled Medications   Medication Dose Frequency    acetaminophen  1,000 mg TID    Pharmacy Consult Request  1 Each PHARMACY TO DOSE    senna-docusate  2 Tablet BID    omeprazole  20 mg DAILY    enoxaparin (LOVENOX) injection  40 mg DAILY AT 1800    [Held by provider] telmisartan  40 mg Q EVENING     PRN medications: Respiratory Therapy Consult, hydrALAZINE, senna-docusate **AND** polyethylene glycol/lytes, ondansetron **OR** ondansetron, sodium chloride, oxyCODONE immediate-release **OR** oxyCODONE immediate-release    Diet:  Current Diet Order   Procedures    Diet Order Diet: Regular       Medical Decision Making and Plan:  Fracture of right (dominant side) humerus   - 3 part proximal humerus fracture   - s/p closed manipulation and treatment by Dr. Ramírez Denney MD on 7/16   - NWB RUE   - PT/OT continue     Fracture of left tibia   - Comminuted proximal tibia fracture   - S/p Open reduction internal fixation left tibial shaft with intramedullary nail by Dr. Ramírez Denney MD on 7/16   - WBAT LLE   - PT/OT while in house   - good candidate for IPR     Acute blood loss anemia   - Hgb 9.6,  continues to drop   -Possible hematoma in left leg?  - Checking Ferritin, Iron studies      Orthostatic Hypotension   - Secondary to acute blood loss anemia   - IV fluids   - JACKIE hose   - Abdominal binder  -7/25- orthostatic in therapy in the am. Hold telmisartan     Uncontrolled neuropathic pain   - 7/24- stop gabapentin     Hypertension   - Telmisartan 40mg Q evening - holding      Circadian Rhythm disorder:   Recommend lights on during the day/off at night, minimize nighttime interruptions as able.     Mood  - at risk of adjustment disorder, depression, and anxiety due to functional decline     ID:  - at risk for Urinary tract infection     Skin/Wounds:  - Pressure relief q2h while in bed. Close monitoring for signs of breakdown     Pain:  - Neuroceptic - On Tylenol prn, continue oxycodone 5mg Q4hrs PRN  - Neuropathic - continue gabapentin 100mg TID     DVT prophylaxis:  continue lovenox     GI prophylaxis:  On Prilosec 20mg daily         -Follow-up Ortho, PCP    ____________________________________    Derik Hays MD  Physical Medicine & Rehabilitation   Brain Injury Medicine   ____________________________________    Total time:  60 minutes. Time spent included pre-rounding, review of vitals and tests, unit/floor time, face-to-face time with the patient including physical examination, care coordination, counseling of patient and/or family, ordering medications/procedures/tests, discussion with CM, PT, OT, SLP and/or other healthcare providers, and documentation in the electronic medical record. Topics discussed included dispostion.

## 2024-07-26 ENCOUNTER — APPOINTMENT (OUTPATIENT)
Dept: PHYSICAL THERAPY | Facility: REHABILITATION | Age: 79
DRG: 560 | End: 2024-07-26
Attending: PHYSICAL MEDICINE & REHABILITATION
Payer: COMMERCIAL

## 2024-07-26 ENCOUNTER — APPOINTMENT (OUTPATIENT)
Dept: OCCUPATIONAL THERAPY | Facility: REHABILITATION | Age: 79
DRG: 560 | End: 2024-07-26
Attending: PHYSICAL MEDICINE & REHABILITATION
Payer: COMMERCIAL

## 2024-07-26 PROCEDURE — 770010 HCHG ROOM/CARE - REHAB SEMI PRIVAT*

## 2024-07-26 PROCEDURE — 99232 SBSQ HOSP IP/OBS MODERATE 35: CPT | Performed by: PHYSICAL MEDICINE & REHABILITATION

## 2024-07-26 PROCEDURE — 97530 THERAPEUTIC ACTIVITIES: CPT

## 2024-07-26 PROCEDURE — 700111 HCHG RX REV CODE 636 W/ 250 OVERRIDE (IP): Mod: JZ | Performed by: PHYSICAL MEDICINE & REHABILITATION

## 2024-07-26 PROCEDURE — 97110 THERAPEUTIC EXERCISES: CPT

## 2024-07-26 PROCEDURE — 700102 HCHG RX REV CODE 250 W/ 637 OVERRIDE(OP): Performed by: PHYSICAL MEDICINE & REHABILITATION

## 2024-07-26 PROCEDURE — A9270 NON-COVERED ITEM OR SERVICE: HCPCS | Performed by: PHYSICAL MEDICINE & REHABILITATION

## 2024-07-26 RX ADMIN — SENNOSIDES AND DOCUSATE SODIUM 2 TABLET: 50; 8.6 TABLET ORAL at 08:56

## 2024-07-26 RX ADMIN — ACETAMINOPHEN 1000 MG: 500 TABLET, FILM COATED ORAL at 08:56

## 2024-07-26 RX ADMIN — ACETAMINOPHEN 1000 MG: 500 TABLET, FILM COATED ORAL at 14:52

## 2024-07-26 RX ADMIN — SENNOSIDES AND DOCUSATE SODIUM 2 TABLET: 50; 8.6 TABLET ORAL at 21:50

## 2024-07-26 RX ADMIN — OXYCODONE HYDROCHLORIDE 5 MG: 5 TABLET ORAL at 08:56

## 2024-07-26 RX ADMIN — OMEPRAZOLE 20 MG: 20 CAPSULE, DELAYED RELEASE ORAL at 08:56

## 2024-07-26 RX ADMIN — ACETAMINOPHEN 1000 MG: 500 TABLET, FILM COATED ORAL at 21:50

## 2024-07-26 RX ADMIN — ENOXAPARIN SODIUM 40 MG: 100 INJECTION SUBCUTANEOUS at 17:12

## 2024-07-26 ASSESSMENT — PATIENT HEALTH QUESTIONNAIRE - PHQ9
2. FEELING DOWN, DEPRESSED, IRRITABLE, OR HOPELESS: NOT AT ALL
1. LITTLE INTEREST OR PLEASURE IN DOING THINGS: NOT AT ALL
SUM OF ALL RESPONSES TO PHQ9 QUESTIONS 1 AND 2: 0

## 2024-07-26 ASSESSMENT — ACTIVITIES OF DAILY LIVING (ADL)
BED_CHAIR_WHEELCHAIR_TRANSFER_DESCRIPTION: INCREASED TIME;SET-UP OF EQUIPMENT
BED_CHAIR_WHEELCHAIR_TRANSFER_DESCRIPTION: VERBAL CUEING;SUPERVISION FOR SAFETY;INCREASED TIME;INITIAL PREPARATION FOR TASK

## 2024-07-26 NOTE — PROGRESS NOTES
"  Physical Medicine & Rehabilitation Progress Note    Encounter Date: 7/26/2024    Chief Complaint: Weakness    Interval Events (Subjective):  Seen in chair, Tolerating therapy better off bp pill. No new concerns. Pain controlled    Objective:  VITAL SIGNS: /51   Pulse 84   Temp 37.2 °C (98.9 °F) (Oral)   Resp 18   Ht 1.626 m (5' 4\")   Wt 76.7 kg (169 lb)   SpO2 93%   BMI 29.01 kg/m²   Gen: No acute distress, well developed well nourished adult  HEENT: Normal Cephalic Atraumatic, Normal conjunctiva.   CV: warm extremities, well perfused, no edema  Resp: symmetric chest rise, breathing comfortably on room air  Abd: Soft, Non distended  Extremities: normal bulk, no atrophy  Skin: no visible rashes or lesions.   Neuro: alert, awake  Psych: Mood and affect appropriate and congruent    Laboratory Values:  Recent Results (from the past 72 hour(s))   CBC with Differential    Collection Time: 07/24/24  6:05 AM   Result Value Ref Range    WBC 7.1 4.8 - 10.8 K/uL    RBC 3.23 (L) 4.20 - 5.40 M/uL    Hemoglobin 10.0 (L) 12.0 - 16.0 g/dL    Hematocrit 29.9 (L) 37.0 - 47.0 %    MCV 92.6 81.4 - 97.8 fL    MCH 31.0 27.0 - 33.0 pg    MCHC 33.4 32.2 - 35.5 g/dL    RDW 44.5 35.9 - 50.0 fL    Platelet Count 230 164 - 446 K/uL    MPV 10.5 9.0 - 12.9 fL    Neutrophils-Polys 66.70 44.00 - 72.00 %    Lymphocytes 20.10 (L) 22.00 - 41.00 %    Monocytes 8.20 0.00 - 13.40 %    Eosinophils 3.50 0.00 - 6.90 %    Basophils 0.80 0.00 - 1.80 %    Immature Granulocytes 0.70 0.00 - 0.90 %    Nucleated RBC 0.00 0.00 - 0.20 /100 WBC    Neutrophils (Absolute) 4.70 1.82 - 7.42 K/uL    Lymphs (Absolute) 1.42 1.00 - 4.80 K/uL    Monos (Absolute) 0.58 0.00 - 0.85 K/uL    Eos (Absolute) 0.25 0.00 - 0.51 K/uL    Baso (Absolute) 0.06 0.00 - 0.12 K/uL    Immature Granulocytes (abs) 0.05 0.00 - 0.11 K/uL    NRBC (Absolute) 0.00 K/uL   Comp Metabolic Panel (CMP)    Collection Time: 07/24/24  6:05 AM   Result Value Ref Range    Sodium 137 135 - 145 " mmol/L    Potassium 4.1 3.6 - 5.5 mmol/L    Chloride 103 96 - 112 mmol/L    Co2 24 20 - 33 mmol/L    Anion Gap 10.0 7.0 - 16.0    Glucose 108 (H) 65 - 99 mg/dL    Bun 28 (H) 8 - 22 mg/dL    Creatinine 0.73 0.50 - 1.40 mg/dL    Calcium 8.8 8.5 - 10.5 mg/dL    Correct Calcium 9.4 8.5 - 10.5 mg/dL    AST(SGOT) 30 12 - 45 U/L    ALT(SGPT) 37 2 - 50 U/L    Alkaline Phosphatase 201 (H) 30 - 99 U/L    Total Bilirubin 0.5 0.1 - 1.5 mg/dL    Albumin 3.3 3.2 - 4.9 g/dL    Total Protein 6.3 6.0 - 8.2 g/dL    Globulin 3.0 1.9 - 3.5 g/dL    A-G Ratio 1.1 g/dL   HEMOGLOBIN A1C    Collection Time: 07/24/24  6:05 AM   Result Value Ref Range    Glycohemoglobin 4.9 4.0 - 5.6 %    Est Avg Glucose 94 mg/dL   Magnesium    Collection Time: 07/24/24  6:05 AM   Result Value Ref Range    Magnesium 2.3 1.5 - 2.5 mg/dL   ESTIMATED GFR    Collection Time: 07/24/24  6:05 AM   Result Value Ref Range    GFR (CKD-EPI) 83 >60 mL/min/1.73 m 2       Medications:  Scheduled Medications   Medication Dose Frequency    acetaminophen  1,000 mg TID    Pharmacy Consult Request  1 Each PHARMACY TO DOSE    senna-docusate  2 Tablet BID    omeprazole  20 mg DAILY    enoxaparin (LOVENOX) injection  40 mg DAILY AT 1800     PRN medications: Respiratory Therapy Consult, hydrALAZINE, senna-docusate **AND** polyethylene glycol/lytes, ondansetron **OR** ondansetron, sodium chloride, oxyCODONE immediate-release **OR** oxyCODONE immediate-release    Diet:  Current Diet Order   Procedures    Diet Order Diet: Regular       Medical Decision Making and Plan:  Fracture of right (dominant side) humerus   - 3 part proximal humerus fracture   - s/p closed manipulation and treatment by Dr. Ramírez Denney MD on 7/16   - NWB RUE   - PT/OT continue     Fracture of left tibia   - Comminuted proximal tibia fracture   - S/p Open reduction internal fixation left tibial shaft with intramedullary nail by Dr. Ramírez Denney MD on 7/16   - WBAT LLE   - PT/OT while in house   - good  candidate for IPR     Acute blood loss anemia   - Hgb 9.6, continues to drop   -Possible hematoma in left leg?  - Checking Ferritin, Iron studies      Orthostatic Hypotension   - Secondary to acute blood loss anemia   - IV fluids   - JACKIE hose   - Abdominal binder  -7/25- orthostatic in therapy in the am. Hold telmisartan  -7/26- DC telmisartab     Uncontrolled neuropathic pain   - 7/24- stop gabapentin     Hypertension   - Telmisartan 40mg Q evening - holding      Circadian Rhythm disorder:   Recommend lights on during the day/off at night, minimize nighttime interruptions as able.     Mood  - at risk of adjustment disorder, depression, and anxiety due to functional decline     ID:  - at risk for Urinary tract infection     Skin/Wounds:  - Pressure relief q2h while in bed. Close monitoring for signs of breakdown     Pain:  - Neuroceptic - On Tylenol prn, continue oxycodone prn  - Neuropathic - DC gabapentin 100mg TID     DVT prophylaxis:  continue lovenox     GI prophylaxis:  On Prilosec 20mg daily         -Follow-up Ortho, PCP       ____________________________________    Derik Hays MD  Physical Medicine & Rehabilitation   Brain Injury Medicine   ____________________________________

## 2024-07-26 NOTE — CARE PLAN
"The patient is Stable - Low risk of patient condition declining or worsening    Shift Goals  Clinical Goals: safety  Patient Goals: safety    Progress made toward(s) clinical / shift goals:      Problem: Pain - Standard  Goal: Alleviation of pain or a reduction in pain to the patient’s comfort goal  Outcome: Progressing  Note: Roxicodone 5mg given PRN per MAR for c/o left knee and leg pain with adequate relief. Pt sleeps good. Will continue to monitor.     Problem: Fall Risk - Rehab  Goal: Patient will remain free from falls  Outcome: Progressing  Note: Fifi Evangelista Fall risk Assessment Score: 15    High fall risk Interventions   - Alarming seatbelt  - Bed and strip alarm   - Yellow sign by the door   - Yellow wrist band \"Fall risk\"  - Room near to the nurse station  - Do not leave patient unattended in the bathroom  - Fall risk education provided    Pt calls appropriately when in need of assistance.       Patient is not progressing towards the following goals:      "

## 2024-07-26 NOTE — THERAPY
Occupational Therapy  Daily Treatment     Patient Name: Gali Broderick  Age:  79 y.o., Sex:  female  Medical Record #: [id number]  Today's Date: 7/26/2024     Precautions  Precautions: Weight Bearing As Tolerated Left Lower Extremity, Non Weight Bearing Right Upper Extremity, Fall Risk, Sling Right Upper Extremity  Comments: Orthostatic hypotension; wear compression garments when OOB, Tubi  on L LE and teds on RLE, abdominial binder         Subjective    Pt up and willing to work with OT for both morning and afternoon sessions.      Objective       07/26/24 1031 07/26/24 1401   OT Charge Group   OT Therapy Activity (Units) 4 2   OT Total Time Spent   OT Individual Total Time Spent (Mins) 60 30   Functional Level of Assist   Upper Body Dressing  --    (assist to don sling)   Bed, Chair, Wheelchair Transfer Contact Guard Assist Contact Guard Assist  (to get from therapy mat to WC)   Bed Chair Wheelchair Transfer Description Verbal cueing;Supervision for safety;Increased time;Initial preparation for task  (stand pivot with kari walker)  --    Sitting Upper Body Exercises   Comments  --  pendulum swings 3x10 of side to side and circles, with rest breaks in between.   Balance   Comments Pt stood during peg board and folding activities. CGA in dynamic standing. See comments for info about activities  --        Treatment comments: Pt stood at table unsupported and copied a simple image on a large upright peg board. Pt required one rest break. Also took pt's BP in standing during activity. Pt also stood to fold 8 laundry items. Putting most weight through the uninjured leg.     1031 Session BP values:     07/26/24 1040 07/26/24 1054 07/26/24 1106   Vitals   Patient BP Position Rossi's Position Sitting Standing 1 minute   Blood Pressure  98/46 115/40 90/50           Assessment    Pt seen twice this day for OT tx. First session focused on standing tolerance and orthostatics measurement. Pt tolerated standing fair. During  second session, pt tolerated pendulums well without significant increase in pain.   Strengths: Able to follow instructions, Alert and oriented, Effective communication skills, Independent prior level of function, Manages pain appropriately, Motivated for self care and independence, Pleasant and cooperative, Supportive family, Willingly participates in therapeutic activities  Barriers: Decreased endurance, Generalized weakness, Impaired balance, Limited mobility    Plan    Pendulums  Prentiss with ADLs  Standing tolerance/balance  LUE strengthening  Prentiss with functional transfers-trial transfer to walk in shower    DME  OT DME Recommendations  Bathroom Equipment:  (shower chair)    Passport items to be completed:  Perform bathroom transfers, complete dressing, complete feeding, get ready for the day, prepare a simple meal, participate in household tasks, adapt home for safety needs, demonstrate home exercise program, complete caregiver training     Occupational Therapy Goals (Active)       Problem: Bathing       Dates: Start:  07/25/24         Goal: STG-Within one week, patient will bathe with min assist using LRAD as needed       Dates: Start:  07/25/24               Problem: Dressing       Dates: Start:  07/25/24         Goal: STG-Within one week, patient will dress UB with min assist using LRAD as needed       Dates: Start:  07/25/24            Goal: STG-Within one week, patient will dress LB with min assist using LRAD as needed       Dates: Start:  07/25/24               Problem: Functional Transfers       Dates: Start:  07/25/24         Goal: STG-Within one week, patient will transfer to toilet at SPV level using LRAD as needed       Dates: Start:  07/25/24               Problem: OT Long Term Goals       Dates: Start:  07/25/24         Goal: LTG-By discharge, patient will complete basic self care tasks at mod I level using LRAD as needed       Dates: Start:  07/25/24            Goal: LTG-By  discharge, patient will perform bathroom transfers at mod I level using LRAD as needed       Dates: Start:  07/25/24

## 2024-07-26 NOTE — THERAPY
Physical Therapy   Daily Treatment     Patient Name: Gali Broderick  Age:  79 y.o., Sex:  female  Medical Record #: [id number]  Today's Date: 7/26/2024     Precautions  Precautions: Weight Bearing As Tolerated Left Lower Extremity, Non Weight Bearing Right Upper Extremity, Fall Risk, Sling Right Upper Extremity  Comments: Orthostatic hypotension; wear compression garments when OOB, Tubi  on L LE and teds on RLE, abdominial binder    Subjective    Patient was found in bed for both morning and afternoon sessions and was agreeable to therapy both times. When getting out of bed in the morning patient reported some dizziness and nausea but it passed (see vital below). For afternoon session patient declined trying to walk due to fatigue from standing for a longer period with OT.     Objective       07/26/24 0831 07/26/24 1331   PT Charge Group   PT Therapeutic Exercise (Units) 3 2   PT Therapeutic Activities (Units) 1  --    PT Total Time Spent   PT Individual Total Time Spent (Mins) 60 30   Pain   Pain Scales 0 to 10 Scale   --    Intervention Cold Pack  (ice pack provided at end of treatment)  --    Pain 0 - 10 Group   Location Tibia  --    Location Orientation Left  --    Pain Rating Scale (NPRS) 9  --    Description Constant;Aching  --    Comfort Goal Comfort with Movement  --    Transfer Functional Level of Assist   Bed, Chair, Wheelchair Transfer  --  Minimal Assist   Bed Chair Wheelchair Transfer Description  --  Increased time;Set-up of equipment  (help control decent into chair)   Supine Lower Body Exercise   Bridges  --  Two Legged;1 set of 10   Hip Abduction  --  1 set of 10;Light Resistance Theraband  (Bent knees w/ theraband and 2 sets of 10 with leg straight, no resistance)   Hip Adduction   --  1 set of 10;Bilateral  (ball squeeze)   Short Arc Quad  --  2 sets of 10;Bilateral   Heel Slide  --  2 sets of 10;Bilateral  (w/ leg  for L)   Ankle Pumps  --  2 sets of 10   Quadriceps Isometrics  --  2  Problem: Communication  Goal: The ability to communicate needs accurately and effectively will improve  Outcome: PROGRESSING AS EXPECTED  Pt is able to communicate needs appropriately. Call light within reach.        Problem: Safety  Goal: Will remain free from injury  Outcome: PROGRESSING AS EXPECTED  Fall precautions in place. Bed in lowest position. Non-skid socks in place. Personal possessions within reach. Mobility sign on door. Bed-alarm on. Call light within reach. Pt educated regarding fall prevention and states understanding.          sets of 10   Sitting Lower Body Exercises   Hip Abduction 2 sets of 10;Light Resistance Theraband  --    Hip Adduction 2 sets of 10  (ball squeeze)  --    Long Arc Quad 2 sets of 10;Bilateral  --    Marching 2 sets of 10;Reciprocal  --    Hamstring Curl 2 sets of 10;Light Resistance Theraband  --    Standing Lower Body Exercises   Hip Flexion 2 sets of 10;Left  --    Hip Abduction 2 sets of 10;Left  --    Bed Mobility    Supine to Sit Supervised  --    Sit to Supine Supervised  --    Sit to Stand Contact Guard Assist Contact Guard Assist   Scooting Independent  --    Rolling Independent  --    Interdisciplinary Plan of Care Collaboration   IDT Collaboration with  Nursing Family / Caregiver;Nursing   Patient Position at End of Therapy In Bed;Bed Alarm On;Call Light within Reach;Tray Table within Reach;Phone within Reach   (left with OT in gym)   Collaboration Comments   (spoke with nursing about pain meds, pt thought they wre scheduled and clarified tylenol is scheduled but oxy is PRN)   ( present for therapy, texted nursing about pain meds)        07/26/24 0837 07/26/24 0842 07/26/24 0846   Vitals   Pulse 75 93 (!) 113   Patient BP Position Supine Sitting Standing 3 minutes  (standing x 1 min BP didn't take. Patient reports minimal to no orthostatic symptoms, reports pain in LLE instead)   Blood Pressure  105/45 114/42 95/48  (Pelon hose and tube  LLE and abd binder. )       Assessment    The patient is seeing some improvements w/ LE strength but still has significant weakness in the L leg and is struggling to put any weight through it. Gait was attempted in the morning but was too painful and patient was too tired in the afternoon. Patient also fatigues easily and will benefit from continued endurance training. Patient is improving with transfers, with safer and more controlled movements but still has the tendency to quickly plop down into chair w/out control as she fatigues. Caution should also still be  taken with her orthostatic hypotension/low BP, especially in morning when getting up for first time.     Strengths: Able to follow instructions, Alert and oriented, Effective communication skills, Independent prior level of function, Motivated for self care and independence, Pleasant and cooperative, Willingly participates in therapeutic activities, Supportive family  Barriers: Decreased endurance, Fatigue, Generalized weakness, Impaired activity tolerance, Impaired balance, Limited mobility, Pain, Impulsive    Plan    Progressing to gait with ejlani walker  Strengthening in all planes  Balance  Transfer safety  Car transfers and stairs as able    DME  PT DME Recommendations  Assistive Device: Jelani Walker    Passport items to be completed:  Get in/out of bed safely, in/out of a vehicle, safely use mobility device, walk or wheel around home/community, navigate up and down stairs, show how to get up/down from the ground, ensure home is accessible, demonstrate HEP, complete caregiver training    Physical Therapy Problems (Active)       Problem: Balance       Dates: Start:  07/24/24         Goal: STG-Within one week, patient will maintain dynamic standing during AP and lateral weight shifts on firm surface for one min with contact guard assist.        Dates: Start:  07/24/24            Goal: STG-Within one week, patient will tolerate caputo balance test.       Dates: Start:  07/24/24               Problem: Mobility       Dates: Start:  07/24/24         Goal: STG-Within one week, patient will ambulate 10 ft using hemiwalker with contact guard assist.        Dates: Start:  07/24/24            Goal: STG-Within one week, patient will ambulate up/down a curb using hemiwalker and contact guard assist.        Dates: Start:  07/24/24               Problem: Mobility Transfers       Dates: Start:  07/24/24         Goal: STG-Within one week, patient will transfer bed to chair with stand pivot transfer safely with supervision assist.         Dates: Start:  07/24/24               Problem: PT-Long Term Goals       Dates: Start:  07/24/24         Goal: LTG-By discharge, patient will receive >35/56 on caputo balance test.        Dates: Start:  07/24/24            Goal: LTG-By discharge, patient will ambulate 100 ft using hemiwalker or LRAD with supervision assist.        Dates: Start:  07/24/24            Goal: LTG-By discharge, patient will perform home exercise program using handouts independently.        Dates: Start:  07/24/24            Goal: LTG-By discharge, patient will transfer in/out of a car using hemiwalker with supervision assist.        Dates: Start:  07/24/24

## 2024-07-26 NOTE — THERAPY
Recreational Therapy   Initial Evaluation     Patient Name: Gali Broderick  Age:  79 y.o., Sex:  female  Medical Record #: [id number]  Today's Date: 7/26/2024     Subjective    Patient was curious what Recreation Therapy was and willing to participate in the assessment interview.     Objective       07/26/24 1301   Procedural Tracking   Procedural Tracking Community Re-Integration;Leisure Skills Awareness   Treatment Time   Total Time Spent (mins) 30   Leisure History   Leisure Interests Family;Card;Crafts;Hobbies;Reading  (hiking, [employer], [employer], book club, Rastafari.)   Prior Living Arrangements   Lives with - Patient's Self Care Capacity Spouse   Functional Ability Status - Cognitive   Attention Span Remains on Task   Comprehension Follows Three Step Commands   Judgment Able to Make Independent Decisions   Functional Ability Status - Emotional    Affect Appropriate   Mood Appropriate   Behavior Appropriate;Cooperative   Leisure Competence Measure   Leisure Awareness Independent   Leisure Attitude Independent   Leisure Skills Independent   Cultural / Social Behaviors Independent   Interpersonal Skills Independent   Community Integration Skills Independent   Social Contact Independent   Community Participation Independent   Current Discharge Plan   Current Discharge Plan Return to Prior Living Situation   Benefit    Benefit Patient Refuses Inpatient Recreational Therapy at this Time.    Interdisciplinary Plan of Care Collaboration   IDT Collaboration with  Family / Caregiver   Patient Position at End of Therapy In Bed   Collaboration Comments  in session   Strengths & Barriers   Strengths Able to follow instructions;Alert and oriented;Effective communication skills;Good insight into deficits/needs;Independent prior level of function;Motivated for self care and independence;Pleasant and cooperative;Willingly participates in therapeutic activities;Supportive family   Barriers Limited mobility  "        Assessment  Patient is 79 y.o. female with a diagnosis of Multiple trauma .  Additional factors influencing patient status / progress (ie: cognitive factors, co-morbidities, social support, etc): Patient has good social support and healthy leisure activities she is involved in.      According to the H&P, patient has a past medical history of HTN, bilateral knee pain, aortic root dilation, moderate aortic insufficiency, DLD, CKD;  who presented on 7/15/2024  2:33 PM with right shoulder and left knee pain after mechanical GLF while hiking. She was seen by orthopedics and found to have a left tibial shaft fracture and right threepart proximal humerus fracture. She was taken to the OR by Dr. Ramírez Denney MD on 7/16 for ORIF left tibal shaft with IM nail and closed treatment of right humerus fracture. Postoperatively she is WBAT LLE and NWB with sling for RUE. Current labs reflect anemia with hgb 9.9     Patient has continued anemia which may be related to her left leg pain and swelling, there is concern for an intramuscular hematoma vs seroma. This would not be operative but would require daily labs for observation. She is also have orthostatic hypotension related to her anemia, she reports feeling hot, nauseas and light headed with standing. She had emesis once. 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     Patient enjoys walking, hiking, playing bunco and pinochle with her friends, is in a book club, quilts, enjoys travel, goes on mission trips with her Scientologist, etc. She has a supportive  and together they lead walks. When asked about how she garcía she said she has a blessed life but her biggest \"sin\" is that she worries when she knows she needs to trust God. Patient did say her sana helps her cope. Patient has healthy leisure interests and a good support system. Patient was offered Recreation Therapy to work on standing tolerance but she is reporting that her therapy is already " wearing her out and she was not interested in adding Recreation Therapy to her schedule at this time.        Plan  Certified Therapeutic Recreation Specialist has screened this patient and determined that no skilled Recreation Therapy services are indicated at this time due to patient having a healthy leisure lifestyle she will be adilson to return to.  Thank you for your referral. If a change in patient's function should occur, Recreation Therapy can reevaluate for appropriateness at that time.

## 2024-07-26 NOTE — DISCHARGE PLANNING
Case Management/IDT follow up.   Projected dc date:  IDT continues to recommend IRF level of care as patient continue to make progress with all therapies.     DC needs  Home health:  PT/OT/RN IF WE CAN SECURE DUE TO PT'S INSURANCE  DME: TO BE DETERMINED-USING A ROMAN WALKER   Family training:TO BE SCHEDULED W/ SPOUSE    Follow up:   PCP:  Other: Ortho    I met with patient \ providing update from IDT and discussed plan of care.  She is in agreement w/ plan.     Plan:  Continue to follow

## 2024-07-26 NOTE — CARE PLAN
Problem: Skin Integrity  Goal: Skin integrity is maintained or improved  Outcome: Progressing  Patient encouraged to turn to sides to prevent pressure areas.     Problem: Fall Risk - Rehab  Goal: Patient will remain free from falls  Outcome: Progressing  Patient reminded to call for assist with needs/transfers to prevent falls.   The patient is Stable - Low risk of patient condition declining or worsening    Shift Goals  Clinical Goals: safety  Patient Goals: safety    Progress made toward(s) clinical / shift goals:      Patient is not progressing towards the following goals:

## 2024-07-26 NOTE — PROGRESS NOTES
NURSING DAILY NOTE    Name: Gali Broderick   Date of Admission: 7/23/2024   Admitting Diagnosis: Multiple trauma  Attending Physician: Derik Hays M.d.  Allergies: Iodine    Safety  Patient Assist  min to mod  Patient Precautions  Weight Bearing As Tolerated Left Lower Extremity, Non Weight Bearing Right Upper Extremity, Fall Risk, Sling Right Upper Extremity  Precaution Comments  Orthostatic hypotension; wear compression garments when OOB, Tubi  on L LE and teds on RLE, abdominial binder  Bed Transfer Status  Contact Guard Assist (to get from therapy mat to WC)  Toilet Transfer Status   Contact Guard Assist  Assistive Devices  Wheelchair  Oxygen  None - Room Air  Diet/Therapeutic Dining  Current Diet Order   Procedures    Diet Order Diet: Regular     Pill Administration  whole  Agitated Behavioral Scale     ABS Level of Severity       Fall Risk  Has the patient had a fall this admission?   No  Fifi Evangelista Fall Risk Scoring  15, HIGH RISK  Fall Risk Safety Measures  bed alarm, chair alarm, and seatbelt alarm    Vitals  Temperature: 37.1 °C (98.7 °F)  Temp src: Oral  Pulse: 84  Respiration: 20  Blood Pressure : (!) 108/90  Blood Pressure MAP (Calculated): 96 MM HG  BP Location: Left, Upper Arm  Patient BP Position: Supine     Oxygen  Pulse Oximetry: 93 %  O2 (LPM): 0  O2 Delivery Device: None - Room Air    Bowel and Bladder  Last Bowel Movement  07/25/24  Stool Type  Type 4: Like a sausage or snake, smooth and soft  Bowel Device  Bathroom  Continent  Bladder: Did not void   Bowel: No movement  Bladder Function  Urine Void (mL):  (moderate)  Number of Times Voided: 1  Urine Color: Unable To Evaluate  Genitourinary Assessment   Bladder Assessment (WDL):  WDL Except  Ariza Catheter: Not Applicable  Urine Color: Unable To Evaluate  Bladder Device: Bathroom  Bladder Scan: Post Void    Skin  Sascha Score   17  Sensory Interventions   Skin Preventative  Measures: Waffle Overlay, Pillows in Use for Support / Positioning  Moisture Interventions  Moisturizers/Barriers: Barrier Wipes      Pain  Pain Rating Scale  5 - Interrupts some activities  Pain Location  Knee, Leg  Pain Location Orientation  Left  Pain Interventions   Medication (see MAR)    ADLs    Bathing    (shower with ot)  Linen Change      Personal Hygiene  Moist Estefany Wipes  Chlorhexidine Bath      Oral Care     Teeth/Dentures     Shave     Nutrition Percentage Eaten  *  * Meal *  *, Breakfast  Environmental Precautions  Bed in Low Position  Patient Turns/Positioning  Patient Turns Self from Side to Side  Patient Turns Assistance/Tolerance     Bed Positions  Bed Controls On  Head of Bed Elevated  Self regulated      Psychosocial/Neurologic Assessment  Psychosocial Assessment  Psychosocial (WDL):  Within Defined Limits  Neurologic Assessment  Neuro (WDL): Exceptions to WDL  Level of Consciousness: Alert  Orientation Level: Oriented X4  Cognition: Appropriate judgement, Follows commands  Speech: Clear  Motor Function/Sensation Assessment: Sensation  RUE Sensation: Tingling, Pain  LLE Sensation: Tingling, Pain  EENT (WDL):  Within Defined Limits    Cardio/Pulmonary Assessment  Edema   RUE Edema: Generalized  LLE Edema: Generalized  Respiratory Breath Sounds  RUL Breath Sounds: Clear  RML Breath Sounds: Clear  RLL Breath Sounds: Clear  BERTA Breath Sounds: Clear  LLL Breath Sounds: Clear  Cardiac Assessment   Cardiac (WDL):  WDL Except

## 2024-07-26 NOTE — PROGRESS NOTES
NURSING DAILY NOTE    Name: Gali Broderick   Date of Admission: 7/23/2024   Admitting Diagnosis: Multiple trauma  Attending Physician: Derik Hays M.d.  Allergies: Iodine    Safety  Patient Assist  min to mod  Patient Precautions  Weight Bearing As Tolerated Left Lower Extremity, Non Weight Bearing Right Upper Extremity, Fall Risk, Sling Right Upper Extremity  Precaution Comments  Orthostatic hypotension; wear compression garments when OOB, Tubi  on L LE and teds on RLE, abdominial binder  Bed Transfer Status  Contact Guard Assist  Toilet Transfer Status   Contact Guard Assist  Assistive Devices  Rails, Wheelchair  Oxygen  None - Room Air  Diet/Therapeutic Dining  Current Diet Order   Procedures    Diet Order Diet: Regular     Pill Administration  whole  Agitated Behavioral Scale     ABS Level of Severity       Fall Risk  Has the patient had a fall this admission?   No  Fifi Evangelista Fall Risk Scoring  15, HIGH RISK  Fall Risk Safety Measures  bed alarm, chair alarm, poor balance, and low vision/ hearing    Vitals  Temperature: 36.8 °C (98.3 °F)  Temp src: Temporal  Pulse: 69  Respiration: 18  Blood Pressure : 135/53  Blood Pressure MAP (Calculated): 80 MM HG  BP Location: Left, Upper Arm  Patient BP Position: Supine     Oxygen  Pulse Oximetry: 93 %  O2 (LPM): 0  O2 Delivery Device: None - Room Air    Bowel and Bladder  Last Bowel Movement  07/25/24  Stool Type  Type 4: Like a sausage or snake, smooth and soft  Bowel Device  Bathroom  Continent  Bladder: Did not void   Bowel: No movement  Bladder Function  Urine Void (mL):  (moderate)  Urine Color: Yellow  Genitourinary Assessment   Bladder Assessment (WDL):  WDL Except  Ariza Catheter: Not Applicable  Urine Color: Yellow  Bladder Device: Bathroom  Bladder Scan: Post Void    Skin  Sascha Score   17  Sensory Interventions   Skin Preventative Measures: Waffle Overlay, Pillows in Use for Support /  Positioning  Moisture Interventions  Moisturizers/Barriers: Barrier Wipes      Pain  Pain Rating Scale  1 - Hardly Notice Pain  Pain Location  Knee, Leg  Pain Location Orientation  Left  Pain Interventions   Rest    ADLs    Bathing    (shower with ot)  Linen Change      Personal Hygiene  Moist Estefany Wipes  Chlorhexidine Bath      Oral Care     Teeth/Dentures     Shave     Nutrition Percentage Eaten  Dinner, Between % Consumed  Environmental Precautions  Bed in Low Position, Treaded Slipper Socks on Patient  Patient Turns/Positioning  Patient Turns Self from Side to Side  Patient Turns Assistance/Tolerance     Bed Positions  Bed Controls On  Head of Bed Elevated  Self regulated      Psychosocial/Neurologic Assessment  Psychosocial Assessment  Psychosocial (WDL):  Within Defined Limits  Neurologic Assessment  Neuro (WDL): Exceptions to WDL  Level of Consciousness: Alert  Orientation Level: Oriented X4  Cognition: Appropriate judgement, Follows commands  Speech: Clear  Motor Function/Sensation Assessment: Sensation  RUE Sensation: Tingling, Pain  LLE Sensation: Tingling, Pain  EENT (WDL):  Within Defined Limits    Cardio/Pulmonary Assessment  Edema   RUE Edema: Generalized  LLE Edema: Generalized  Respiratory Breath Sounds  RUL Breath Sounds: Clear  RML Breath Sounds: Clear  RLL Breath Sounds: Clear  BERTA Breath Sounds: Clear  LLL Breath Sounds: Clear  Cardiac Assessment   Cardiac (WDL):  WDL Except

## 2024-07-27 ENCOUNTER — APPOINTMENT (OUTPATIENT)
Dept: PHYSICAL THERAPY | Facility: REHABILITATION | Age: 79
DRG: 560 | End: 2024-07-27
Attending: PHYSICAL MEDICINE & REHABILITATION
Payer: COMMERCIAL

## 2024-07-27 ENCOUNTER — APPOINTMENT (OUTPATIENT)
Dept: OCCUPATIONAL THERAPY | Facility: REHABILITATION | Age: 79
DRG: 560 | End: 2024-07-27
Attending: PHYSICAL MEDICINE & REHABILITATION
Payer: COMMERCIAL

## 2024-07-27 PROCEDURE — 97116 GAIT TRAINING THERAPY: CPT

## 2024-07-27 PROCEDURE — 700102 HCHG RX REV CODE 250 W/ 637 OVERRIDE(OP): Performed by: PHYSICAL MEDICINE & REHABILITATION

## 2024-07-27 PROCEDURE — 97110 THERAPEUTIC EXERCISES: CPT

## 2024-07-27 PROCEDURE — 770010 HCHG ROOM/CARE - REHAB SEMI PRIVAT*

## 2024-07-27 PROCEDURE — A9270 NON-COVERED ITEM OR SERVICE: HCPCS | Performed by: PHYSICAL MEDICINE & REHABILITATION

## 2024-07-27 PROCEDURE — 700111 HCHG RX REV CODE 636 W/ 250 OVERRIDE (IP): Mod: JZ | Performed by: PHYSICAL MEDICINE & REHABILITATION

## 2024-07-27 PROCEDURE — 97535 SELF CARE MNGMENT TRAINING: CPT

## 2024-07-27 RX ADMIN — OMEPRAZOLE 20 MG: 20 CAPSULE, DELAYED RELEASE ORAL at 07:57

## 2024-07-27 RX ADMIN — ACETAMINOPHEN 1000 MG: 500 TABLET, FILM COATED ORAL at 08:56

## 2024-07-27 RX ADMIN — OXYCODONE HYDROCHLORIDE 5 MG: 5 TABLET ORAL at 07:56

## 2024-07-27 RX ADMIN — ACETAMINOPHEN 1000 MG: 500 TABLET, FILM COATED ORAL at 20:15

## 2024-07-27 RX ADMIN — ACETAMINOPHEN 1000 MG: 500 TABLET, FILM COATED ORAL at 14:23

## 2024-07-27 RX ADMIN — ENOXAPARIN SODIUM 40 MG: 100 INJECTION SUBCUTANEOUS at 17:24

## 2024-07-27 ASSESSMENT — GAIT ASSESSMENTS
GAIT LEVEL OF ASSIST: CONTACT GUARD ASSIST
ASSISTIVE DEVICE: HEMI-WALKER
DISTANCE (FEET): 2

## 2024-07-27 ASSESSMENT — ACTIVITIES OF DAILY LIVING (ADL)
TUB_SHOWER_TRANSFER_DESCRIPTION: GRAB BAR;SHOWER BENCH;INCREASED TIME;INITIAL PREPARATION FOR TASK;SET-UP OF EQUIPMENT;SUPERVISION FOR SAFETY;VERBAL CUEING
BED_CHAIR_WHEELCHAIR_TRANSFER_DESCRIPTION: INCREASED TIME;INITIAL PREPARATION FOR TASK;SET-UP OF EQUIPMENT;SUPERVISION FOR SAFETY;VERBAL CUEING
BED_CHAIR_WHEELCHAIR_TRANSFER_DESCRIPTION: INCREASED TIME;INITIAL PREPARATION FOR TASK;SET-UP OF EQUIPMENT;SQUAT PIVOT TRANSFER TO WHEELCHAIR;SUPERVISION FOR SAFETY
BED_CHAIR_WHEELCHAIR_TRANSFER_DESCRIPTION: INCREASED TIME;VERBAL CUEING;SUPERVISION FOR SAFETY;SET-UP OF EQUIPMENT

## 2024-07-27 ASSESSMENT — PATIENT HEALTH QUESTIONNAIRE - PHQ9
1. LITTLE INTEREST OR PLEASURE IN DOING THINGS: NOT AT ALL
2. FEELING DOWN, DEPRESSED, IRRITABLE, OR HOPELESS: NOT AT ALL
SUM OF ALL RESPONSES TO PHQ9 QUESTIONS 1 AND 2: 0

## 2024-07-27 NOTE — THERAPY
Occupational Therapy  Daily Treatment     Patient Name: Gali Broderick  Age:  79 y.o., Sex:  female  Medical Record #: [id number]  Today's Date: 7/27/2024     Precautions  Precautions: Weight Bearing As Tolerated Left Lower Extremity, Non Weight Bearing Right Upper Extremity  Comments: Orthostatic hypotension    Subjective    Pt pleasant and motivated to participate in OT     Objective     07/27/24 0831 07/27/24 1031   OT Charge Group   Charges Yes Yes   OT Self Care / ADL (Units) 2 3   OT Therapeutic Exercise (Units)  --  1   OT Total Time Spent   OT Individual Total Time Spent (Mins) 30 60   Precautions   Precautions Weight Bearing As Tolerated Left Lower Extremity;Non Weight Bearing Right Upper Extremity;Fall Risk;Sling Right Upper Extremity Weight Bearing As Tolerated Left Lower Extremity;Non Weight Bearing Right Upper Extremity   Comments Orthostatic hypotension; wear compression garments when OOB, Tubi  on L LE and teds on RLE, abdominial binder Orthostatic hypotension   Vitals   O2 Delivery Device None - Room Air None - Room Air   Pain   Intervention Declines Declines   Cognition    Level of Consciousness Alert Alert   ABS (Agitated Behavior Scale)   Agitated Behavior Scale Performed No No   Sleep/Wake Cycle   Sleep & Rest Awake Resting   Functional Level of Assist   Grooming Supervision;Seated Supervision;Seated   Grooming Description Increased time;Seated in wheelchair at sink;Supervision for safety Increased time;Seated in wheelchair at sink;Supervision for safety   Bathing  --  Minimal Assist   Bathing Description  --  Grab bar;Hand held shower;Tub bench;Assit with back;Initial preparation for task;Supervision for safety;Verbal cueing  (Provided assist to dry off RUE and LLE)   Upper Body Dressing Minimal Assist Minimal Assist   Upper Body Dressing Description Increased time;Initial preparation for task;Set-up of equipment;Verbal cueing;Assit with threading arms through sleeves  (Provided assist to  thread RUE and bring around shoulder; pt able to manage buttons w/o assist) Assit with threading arms through sleeves;Increased time;Initial preparation for task;Supervision for safety   Lower Body Dressing Minimal Assist Minimal Assist   Lower Body Dressing Description Assist with threading into pant leg;Increased time;Initial preparation for task;Supervision for safety;Verbal cueing  (Provided assist to thread RLE; supine/bed level) Increased time;Initial preparation for task;Supervision for safety;Verbal cueing  (Provided assist to don briefs/pants over R hip; max A for compression socks management)   Bed, Chair, Wheelchair Transfer Contact Guard Assist Standby Assist   Bed Chair Wheelchair Transfer Description Increased time;Initial preparation for task;Set-up of equipment;Supervision for safety;Verbal cueing Increased time;Initial preparation for task;Set-up of equipment;Squat pivot transfer to wheelchair;Supervision for safety   Tub / Shower Transfers  --  Contact Guard Assist   Tub Shower Transfer Description  --  Grab bar;Shower bench;Increased time;Initial preparation for task;Set-up of equipment;Supervision for safety;Verbal cueing   Sitting Upper Body Exercises   Sitting Upper Body Exercises  --  Yes   Upper Extremity Bike  --  Level 3 Resistance  (Pt tolerated 15 minutes w/ one rest break; LUE only)   Bed Mobility    Supine to Sit Supervised Supervised   Sit to Supine Supervised  --    Scooting Supervised Supervised   Rolling Supervised Supervised   Interdisciplinary Plan of Care Collaboration   IDT Collaboration with  Nursing Family / Caregiver   Patient Position at End of Therapy In Bed;Bed Alarm On;Call Light within Reach;Tray Table within Reach;Phone within Reach Seated;Chair Alarm On;Family / Friend in Room  (Pt left in dining arteaga w/ staff supervision, for lunch)   Collaboration Comments RN med pass Pt's  arrived at end of session   Strengths & Barriers   Strengths Able to follow  instructions;Alert and oriented;Effective communication skills;Independent prior level of function;Manages pain appropriately;Motivated for self care and independence;Pleasant and cooperative;Supportive family;Willingly participates in therapeutic activities Able to follow instructions;Alert and oriented;Effective communication skills;Independent prior level of function;Manages pain appropriately;Motivated for self care and independence;Pleasant and cooperative;Supportive family;Willingly participates in therapeutic activities   Barriers Decreased endurance;Generalized weakness;Impaired balance;Limited mobility Decreased endurance;Generalized weakness;Impaired balance;Limited mobility   Oral Hygiene   Assistance Needed Set-up / clean-up  --    Physical Assistance Level No physical assistance  --    CARE Score - Oral Hygiene 5  --    Shower/Bathe Self   Assistance Needed  --  Physical assistance   Physical Assistance Level  --  25% or less   CARE Score - Shower/Bathe Self  --  3   Upper Body Dressing   Assistance Needed Physical assistance Physical assistance   Physical Assistance Level 25% or less 25% or less   CARE Score - Upper Body Dressing 3 3   Lower Body Dressing   Assistance Needed Physical assistance Physical assistance   Physical Assistance Level 25% or less 25% or less   CARE Score - Lower Body Dressing 3 3     Assessment    Pt seen for tx, addressing shower routine, grooming, and strengthening. Pt tolerated activity well. Pt progressing towards goals. Continue OT POC.    Strengths: Able to follow instructions, Alert and oriented, Effective communication skills, Independent prior level of function, Manages pain appropriately, Motivated for self care and independence, Pleasant and cooperative, Supportive family, Willingly participates in therapeutic activities    Barriers: Decreased endurance, Generalized weakness, Impaired balance, Limited mobility    Plan    Pendulums  [place] with ADLs  Standing  tolerance/balance  LUE strengthening  [place] with functional transfers-trial transfer to walk in shower    DME  OT DME Recommendations  Bathroom Equipment:  (shower chair)    Passport items to be completed:  Perform bathroom transfers, complete dressing, complete feeding, get ready for the day, prepare a simple meal, participate in household tasks, adapt home for safety needs, demonstrate home exercise program, complete caregiver training     Occupational Therapy Goals (Active)       Problem: Bathing       Dates: Start:  07/25/24         Goal: STG-Within one week, patient will bathe with min assist using LRAD as needed       Dates: Start:  07/25/24               Problem: Dressing       Dates: Start:  07/25/24         Goal: STG-Within one week, patient will dress UB with min assist using LRAD as needed       Dates: Start:  07/25/24            Goal: STG-Within one week, patient will dress LB with min assist using LRAD as needed       Dates: Start:  07/25/24               Problem: Functional Transfers       Dates: Start:  07/25/24         Goal: STG-Within one week, patient will transfer to toilet at SPV level using LRAD as needed       Dates: Start:  07/25/24               Problem: OT Long Term Goals       Dates: Start:  07/25/24         Goal: LTG-By discharge, patient will complete basic self care tasks at mod I level using LRAD as needed       Dates: Start:  07/25/24            Goal: LTG-By discharge, patient will perform bathroom transfers at mod I level using LRAD as needed       Dates: Start:  07/25/24

## 2024-07-27 NOTE — CARE PLAN
The patient is Watcher - Medium risk of patient condition declining or worsening    Shift Goals  Clinical Goals: safety, sleep  Patient Goals: sleep  Family Goals: no one present    Progress made toward(s) clinical / shift goals:    Problem: Knowledge Deficit - Standard  Goal: Patient and family/care givers will demonstrate understanding of plan of care, disease process/condition, diagnostic tests and medications  Outcome: Progressing  Note: Pt educated and states understanding of POC and pain management. All questions answered at this time. POC ongoing, call light within reach

## 2024-07-27 NOTE — THERAPY
Physical Therapy   Daily Treatment     Patient Name: Gali Broderick  Age:  79 y.o., Sex:  female  Medical Record #: [id number]  Today's Date: 7/27/2024     Precautions  Precautions: Weight Bearing As Tolerated Left Lower Extremity, Non Weight Bearing Right Upper Extremity  Comments: Orthostatic hypotension    Subjective    Patient was found in bed for both sessions and was agreeable to therapy. Patient reports significant pain in her L leg but states her shoulder is feeling better.      Objective       07/27/24 0931 07/27/24 1415   PT Charge Group   PT Gait Training (Units)  --  1   PT Therapeutic Exercise (Units) 3 2   PT Total Time Spent   PT Individual Total Time Spent (Mins) 45 45   Precautions   Precautions Weight Bearing As Tolerated Left Lower Extremity;Non Weight Bearing Right Upper Extremity  --    Comments Orthostatic hypotension  --    Pain 0 - 10 Group   Location Leg  --    Location Orientation Left;Lower  --    Pain Rating Scale (NPRS) 6  --    Gait Functional Level of Assist    Gait Level Of Assist  --  Contact Guard Assist   Assistive Device  --  Jelani-Walker   Distance (Feet)  --  2   # of Times Distance was Traveled  --  1  (Patient took 5 steps total with breaks after every 2 steps due to pain)   Deviation  --  Antalgic;Decreased Base Of Support;Bradykinetic;Decreased Toe Off;Decreased Heel Strike;Step To   Transfer Functional Level of Assist   Bed, Chair, Wheelchair Transfer Contact Guard Assist  --    Bed Chair Wheelchair Transfer Description Increased time;Verbal cueing;Supervision for safety;Set-up of equipment  (Continues to not control descent and have difficulty pivoting.)  --    Supine Lower Body Exercise   Quadriceps Isometrics  --  1 set of 10;Left  (5s holds)   Sitting Lower Body Exercises   Hip Abduction  --  2 sets of 10;Light Resistance Theraband   Hip Adduction  --  2 sets of 10  (ball squeeze)   Long Arc Quad  --  2 sets of 10  (2lbs on R leg)   Marching  --  2 sets of 10   Hamstring  Curl  --  2 sets of 10;Light Resistance Theraband   Nustep Resistance Level 1  (10 min (1 min rest every 1-2 min) focusing on LLE ROM)  --    Other Exercises  --  Seated knee flexion w/ skateboard for ROM   Standing Lower Body Exercises   Hamstring Curl 1 set of 10;Left  --    Hip Flexion 1 set of 10;Left  --    Hip Extension 1 set of 10;Left  --    Hip Abduction 1 set of 10;Left  --    Toe Rise 1 set of 10;Bilateral  --    Other Exercises   (Weight shifts)  --    Interdisciplinary Plan of Care Collaboration   IDT Collaboration with   --  Family / Caregiver;Nursing   Patient Position at End of Therapy In Bed;Bed Alarm On;Call Light within Reach;Tray Table within Reach;Phone within Reach In Bed;Bed Alarm On;Call Light within Reach;Tray Table within Reach;Phone within Reach;Family / Friend in Room   Collaboration Comments  --    (Pts  present throughout session, nursing provided tylenol)         Assessment    The patient is progressing and was able to take several small steps using jelani-walker today however she reports lots of pain in her L leg. Patient is still very limited by pain and fatigue. Patient's left leg still is very weak and painful and would benefit from further strengthening and ROM.    Strengths: Able to follow instructions, Alert and oriented, Effective communication skills, Independent prior level of function, Motivated for self care and independence, Pleasant and cooperative, Willingly participates in therapeutic activities, Supportive family  Barriers: Decreased endurance, Fatigue, Generalized weakness, Impaired activity tolerance, Impaired balance, Limited mobility, Pain, Impulsive    Plan    Progressing to gait with jelani walker  Strengthening in all planes  Balance  Transfer safety  Car transfers and stairs as able    DME  PT DME Recommendations  Assistive Device: Jelani Walker    Passport items to be completed:  Get in/out of bed safely, in/out of a vehicle, safely use mobility device, walk  or wheel around home/community, navigate up and down stairs, show how to get up/down from the ground, ensure home is accessible, demonstrate HEP, complete caregiver training    Physical Therapy Problems (Active)       Problem: Balance       Dates: Start:  07/24/24         Goal: STG-Within one week, patient will maintain dynamic standing during AP and lateral weight shifts on firm surface for one min with contact guard assist.        Dates: Start:  07/24/24            Goal: STG-Within one week, patient will tolerate caputo balance test.       Dates: Start:  07/24/24               Problem: Mobility       Dates: Start:  07/24/24         Goal: STG-Within one week, patient will ambulate 10 ft using hemiwalker with contact guard assist.        Dates: Start:  07/24/24            Goal: STG-Within one week, patient will ambulate up/down a curb using hemiwalker and contact guard assist.        Dates: Start:  07/24/24               Problem: Mobility Transfers       Dates: Start:  07/24/24         Goal: STG-Within one week, patient will transfer bed to chair with stand pivot transfer safely with supervision assist.        Dates: Start:  07/24/24               Problem: PT-Long Term Goals       Dates: Start:  07/24/24         Goal: LTG-By discharge, patient will receive >35/56 on caputo balance test.        Dates: Start:  07/24/24            Goal: LTG-By discharge, patient will ambulate 100 ft using hemiwalker or LRAD with supervision assist.        Dates: Start:  07/24/24            Goal: LTG-By discharge, patient will perform home exercise program using handouts independently.        Dates: Start:  07/24/24            Goal: LTG-By discharge, patient will transfer in/out of a car using hemiwalker with supervision assist.        Dates: Start:  07/24/24

## 2024-07-28 ENCOUNTER — APPOINTMENT (OUTPATIENT)
Dept: OCCUPATIONAL THERAPY | Facility: REHABILITATION | Age: 79
DRG: 560 | End: 2024-07-28
Attending: PHYSICAL MEDICINE & REHABILITATION
Payer: COMMERCIAL

## 2024-07-28 PROCEDURE — 700102 HCHG RX REV CODE 250 W/ 637 OVERRIDE(OP): Performed by: PHYSICAL MEDICINE & REHABILITATION

## 2024-07-28 PROCEDURE — A9270 NON-COVERED ITEM OR SERVICE: HCPCS | Performed by: PHYSICAL MEDICINE & REHABILITATION

## 2024-07-28 PROCEDURE — 700111 HCHG RX REV CODE 636 W/ 250 OVERRIDE (IP): Mod: JZ | Performed by: PHYSICAL MEDICINE & REHABILITATION

## 2024-07-28 PROCEDURE — 770010 HCHG ROOM/CARE - REHAB SEMI PRIVAT*

## 2024-07-28 PROCEDURE — 97535 SELF CARE MNGMENT TRAINING: CPT

## 2024-07-28 PROCEDURE — 97530 THERAPEUTIC ACTIVITIES: CPT

## 2024-07-28 RX ADMIN — ACETAMINOPHEN 1000 MG: 500 TABLET, FILM COATED ORAL at 09:07

## 2024-07-28 RX ADMIN — OMEPRAZOLE 20 MG: 20 CAPSULE, DELAYED RELEASE ORAL at 09:07

## 2024-07-28 RX ADMIN — ACETAMINOPHEN 1000 MG: 500 TABLET, FILM COATED ORAL at 20:39

## 2024-07-28 RX ADMIN — ENOXAPARIN SODIUM 40 MG: 100 INJECTION SUBCUTANEOUS at 17:49

## 2024-07-28 RX ADMIN — ACETAMINOPHEN 1000 MG: 500 TABLET, FILM COATED ORAL at 16:19

## 2024-07-28 ASSESSMENT — PAIN DESCRIPTION - PAIN TYPE: TYPE: ACUTE PAIN

## 2024-07-28 ASSESSMENT — FIBROSIS 4 INDEX: FIB4 SCORE: 1.74

## 2024-07-28 NOTE — PROGRESS NOTES
NURSING DAILY NOTE    Name: Gali Broderick   Date of Admission: 7/23/2024   Admitting Diagnosis: Multiple trauma  Attending Physician: Derik Hays M.d.  Allergies: Iodine    Safety  Patient Assist  mod assist  Patient Precautions  Weight Bearing As Tolerated Left Lower Extremity, Non Weight Bearing Right Upper Extremity  Precaution Comments  Orthostatic hypotension  Bed Transfer Status  Standby Assist  Toilet Transfer Status   Contact Guard Assist  Assistive Devices  Rails, Wheelchair  Oxygen  None - Room Air  Diet/Therapeutic Dining  Current Diet Order   Procedures    Diet Order Diet: Regular     Pill Administration  whole  Agitated Behavioral Scale     ABS Level of Severity       Fall Risk  Has the patient had a fall this admission?   No  Fifi Evangelista Fall Risk Scoring  15, HIGH RISK  Fall Risk Safety Measures  bed alarm, chair alarm, poor balance, and low vision/ hearing    Vitals  Temperature: 36.6 °C (97.9 °F)  Temp src: Temporal  Pulse: 72  Respiration: 18  Blood Pressure : (!) 126/94  Blood Pressure MAP (Calculated): 105 MM HG  BP Location: Left, Upper Arm  Patient BP Position: Supine     Oxygen  Pulse Oximetry: 93 %  O2 (LPM): 0  O2 Delivery Device: None - Room Air    Bowel and Bladder  Last Bowel Movement  07/26/24  Stool Type  Type 1: Separate hard lumps (hard to pass), Type 2: Sausage shaped, but lumpy (pt has hx of hemorroids and had donal red blood wtih BM, given pad and monitoring)  Bowel Device  Bathroom  Continent  Bladder: Did not void   Bowel: No movement  Bladder Function  Urine Void (mL):  (moderate)  Number of Times Voided: 1  Urine Color: Yellow  Genitourinary Assessment   Bladder Assessment (WDL):  Within Defined Limits  Ariza Catheter: Not Applicable  Urine Color: Yellow  Bladder Device: Bathroom  Bladder Scan: Post Void    Skin  Sascha Score   18  Sensory Interventions   Skin Preventative Measures: Pillows in Use for Support /  Positioning  Moisture Interventions  Moisturizers/Barriers: Barrier Wipes      Pain  Pain Rating Scale  1 - Hardly Notice Pain  Pain Location  Leg  Pain Location Orientation  Left, Lower  Pain Interventions   Rest    ADLs    Bathing    (shower with ot)  Linen Change      Personal Hygiene  Moist [place] Wipes, Perineal Care  Chlorhexidine Bath      Oral Care     Teeth/Dentures     Shave     Nutrition Percentage Eaten  Lunch, Between % Consumed  Environmental Precautions  Bed in Low Position, Treaded Slipper Socks on Patient  Patient Turns/Positioning  Patient Turns Self from Side to Side  Patient Turns Assistance/Tolerance     Bed Positions  Bed Controls On  Head of Bed Elevated  Self regulated      Psychosocial/Neurologic Assessment  Psychosocial Assessment  Psychosocial (WDL):  Within Defined Limits  Neurologic Assessment  Neuro (WDL): Exceptions to WDL  Level of Consciousness: Alert  Orientation Level: Oriented X4  Cognition: Appropriate judgement, Follows commands  Speech: Clear  Motor Function/Sensation Assessment: Sensation  RUE Sensation: Tingling, Pain (fx non-op)  LLE Sensation: Tingling, Pain, Other (Comment) (7/16 L tibial shaft fx IM nailing)  EENT (WDL):  Within Defined Limits    Cardio/Pulmonary Assessment  Edema   RUE Edema: Generalized  LLE Edema: Generalized  Respiratory Breath Sounds  RUL Breath Sounds: Clear  RML Breath Sounds: Clear  RLL Breath Sounds: Clear  BERTA Breath Sounds: Clear  LLL Breath Sounds: Clear  Cardiac Assessment   Cardiac (WDL):  WDL Except (aortic root dilation, moderate aortic insuffiency, DLD, HTN)

## 2024-07-28 NOTE — CARE PLAN
"The patient is Stable - Low risk of patient condition declining or worsening    Shift Goals  Clinical Goals: safety, sleep  Patient Goals: sleep  Family Goals: no one present    Progress made toward(s) clinical / shift goals:      Problem: Urinary Elimination  Goal: Establish and maintain regular urinary output  Outcome: Progressing  Note: Pt continent of bladder. Voiding adequately in the BR. She denies dysuria.     Problem: Fall Risk - Rehab  Goal: Patient will remain free from falls  Outcome: Progressing  Note: Fifi Evangelista Fall risk Assessment Score: 15    High fall risk Interventions   - Alarming seatbelt  - Bed and strip alarm   - Yellow sign by the door   - Yellow wrist band \"Fall risk\"  - Room near to the nurse station  - Do not leave patient unattended in the bathroom  - Fall risk education provided    Pt calls appropriately when in need of assistance.       Patient is not progressing towards the following goals:      "

## 2024-07-28 NOTE — THERAPY
"Occupational Therapy  Daily Treatment     Patient Name: Gali Broderick  Age:  79 y.o., Sex:  female  Medical Record #: [id number]  Today's Date: 7/28/2024     Precautions  Precautions: Weight Bearing As Tolerated Left Lower Extremity, Non Weight Bearing Right Upper Extremity  Comments: Orthostatic hypotension         Subjective    Pt supine in bed upon arrival. Pleasant and agreeable to therapy. Requested to change clothes and get ready for the day. Spouse present throughout session     Objective       07/28/24 0931   Vitals   Patient BP Position Sitting  (EOB)   Blood Pressure  116/56   Vitals Comments no TEDs or abdominal binder   Functional Level of Assist   Grooming Supervision;Seated   Upper Body Dressing Minimal Assist   Upper Body Dressing Description Assit with threading arms through sleeves   Lower Body Dressing Minimal Assist   Lower Body Dressing Description Assist with threading into pant leg   Bed, Chair, Wheelchair Transfer Standby Assist   Bed Chair Wheelchair Transfer Description   (stand pivot bed<>w/c)   Interdisciplinary Plan of Care Collaboration   Patient Position at End of Therapy In Bed;Bed Alarm On;Call Light within Reach;Tray Table within Reach;Phone within Reach;Family / Friend in Room     Pt completed 2 STS from EOM w/ kari-walker and SBA. Stood for :30 each time before needing a RB d/t feeling \"shaky\"  BP after STS exercises: 95/68 no TEDs or abdominal binder    Spouse and nursing reported tenderness and discomfort on pt.'s bottom, requesting a new w/c cushion. Provided w/ gel cushion at end of session but pt was unable to test it out as she had already transferred into bed.     Assessment  Pt limited by BP during session. No LOB during any standing tasks, but pt fatigued quickly. Required Min A for dressing tasks d/t pain in RUE. Pt was unable to stand for more than :30 seconds at a time d/t low BP.     Strengths: Able to follow instructions, Alert and oriented, Effective communication " skills, Independent prior level of function, Manages pain appropriately, Motivated for self care and independence, Pleasant and cooperative, Supportive family, Willingly participates in therapeutic activities  Barriers: Decreased endurance, Generalized weakness, Impaired balance, Limited mobility    Plan  Assess new w/c cushion  Pendulums  [place] with ADLs  Standing tolerance/balance  LUE strengthening  [place] with functional transfers-trial transfer to walk in shower      Occupational Therapy Goals (Active)       Problem: Bathing       Dates: Start:  07/25/24         Goal: STG-Within one week, patient will bathe with min assist using LRAD as needed       Dates: Start:  07/25/24               Problem: Dressing       Dates: Start:  07/25/24         Goal: STG-Within one week, patient will dress UB with min assist using LRAD as needed       Dates: Start:  07/25/24            Goal: STG-Within one week, patient will dress LB with min assist using LRAD as needed       Dates: Start:  07/25/24               Problem: Functional Transfers       Dates: Start:  07/25/24         Goal: STG-Within one week, patient will transfer to toilet at SPV level using LRAD as needed       Dates: Start:  07/25/24               Problem: OT Long Term Goals       Dates: Start:  07/25/24         Goal: LTG-By discharge, patient will complete basic self care tasks at mod I level using LRAD as needed       Dates: Start:  07/25/24            Goal: LTG-By discharge, patient will perform bathroom transfers at mod I level using LRAD as needed       Dates: Start:  07/25/24

## 2024-07-29 ENCOUNTER — APPOINTMENT (OUTPATIENT)
Dept: OCCUPATIONAL THERAPY | Facility: REHABILITATION | Age: 79
DRG: 560 | End: 2024-07-29
Attending: PHYSICAL MEDICINE & REHABILITATION
Payer: COMMERCIAL

## 2024-07-29 ENCOUNTER — APPOINTMENT (OUTPATIENT)
Dept: PHYSICAL THERAPY | Facility: REHABILITATION | Age: 79
DRG: 560 | End: 2024-07-29
Attending: PHYSICAL MEDICINE & REHABILITATION
Payer: COMMERCIAL

## 2024-07-29 PROCEDURE — 700102 HCHG RX REV CODE 250 W/ 637 OVERRIDE(OP): Performed by: PHYSICAL MEDICINE & REHABILITATION

## 2024-07-29 PROCEDURE — 700111 HCHG RX REV CODE 636 W/ 250 OVERRIDE (IP): Mod: JZ | Performed by: PHYSICAL MEDICINE & REHABILITATION

## 2024-07-29 PROCEDURE — 97530 THERAPEUTIC ACTIVITIES: CPT

## 2024-07-29 PROCEDURE — A9270 NON-COVERED ITEM OR SERVICE: HCPCS | Performed by: PHYSICAL MEDICINE & REHABILITATION

## 2024-07-29 PROCEDURE — 770010 HCHG ROOM/CARE - REHAB SEMI PRIVAT*

## 2024-07-29 PROCEDURE — 97535 SELF CARE MNGMENT TRAINING: CPT

## 2024-07-29 PROCEDURE — 99232 SBSQ HOSP IP/OBS MODERATE 35: CPT | Performed by: PHYSICAL MEDICINE & REHABILITATION

## 2024-07-29 PROCEDURE — 97110 THERAPEUTIC EXERCISES: CPT

## 2024-07-29 RX ADMIN — ACETAMINOPHEN 1000 MG: 500 TABLET, FILM COATED ORAL at 21:02

## 2024-07-29 RX ADMIN — OMEPRAZOLE 20 MG: 20 CAPSULE, DELAYED RELEASE ORAL at 10:09

## 2024-07-29 RX ADMIN — ACETAMINOPHEN 1000 MG: 500 TABLET, FILM COATED ORAL at 10:09

## 2024-07-29 RX ADMIN — ACETAMINOPHEN 1000 MG: 500 TABLET, FILM COATED ORAL at 16:03

## 2024-07-29 RX ADMIN — ENOXAPARIN SODIUM 40 MG: 100 INJECTION SUBCUTANEOUS at 18:16

## 2024-07-29 ASSESSMENT — ACTIVITIES OF DAILY LIVING (ADL)
BED_CHAIR_WHEELCHAIR_TRANSFER_DESCRIPTION: INCREASED TIME;SET-UP OF EQUIPMENT;VERBAL CUEING
TUB_SHOWER_TRANSFER_DESCRIPTION: GRAB BAR;SHOWER BENCH

## 2024-07-29 NOTE — CARE PLAN
The patient is Stable - Low risk of patient condition declining or worsening    Shift Goals  Clinical Goals: rest; pain management  Patient Goals: sleep  Family Goals: n/a    Progress made toward(s) clinical / shift goals:    Problem: Knowledge Deficit - Standard  Goal: Patient and family/care givers will demonstrate understanding of plan of care, disease process/condition, diagnostic tests and medications  Outcome: Progressing     Problem: Mobility  Goal: Patient's capacity to carry out activities will improve  Outcome: Progressing     Problem: Pain - Standard  Goal: Alleviation of pain or a reduction in pain to the patient’s comfort goal  Outcome: Progressing       Patient is not progressing towards the following goals:

## 2024-07-29 NOTE — THERAPY
"Occupational Therapy  Daily Treatment     Patient Name: Gali Broderick  Age:  79 y.o., Sex:  female  Medical Record #: [id number]  Today's Date: 7/29/2024     Precautions  Precautions: Weight Bearing As Tolerated Left Lower Extremity, Non Weight Bearing Right Upper Extremity  Comments: Orthostatic hypotension         Subjective    \"Can we take a shower?\"     Objective       07/29/24 0931   OT Charge Group   OT Self Care / ADL (Units) 4   OT Total Time Spent   OT Individual Total Time Spent (Mins) 60   Functional Level of Assist   Grooming Modified Independent;Seated   Bathing Standby Assist   Bathing Description Hand held shower;Supervision for safety;Tub bench;Long handled bath tool;Grab bar   Upper Body Dressing Minimal Assist   Upper Body Dressing Description Assit with threading arms through sleeves   Lower Body Dressing Minimal Assist  (assistance to don/doff compression garments and socks over)   Bed, Chair, Wheelchair Transfer Standby Assist   Tub / Shower Transfers Contact Guard Assist   Tub Shower Transfer Description Grab bar;Shower bench         Assessment    Pt seen for OT shower session. Increased independence with lower body dressing, with the exception of compression garments. Pt able to tolerate shower without signs or symptoms of OH.    Strengths: Able to follow instructions, Alert and oriented, Effective communication skills, Independent prior level of function, Manages pain appropriately, Motivated for self care and independence, Pleasant and cooperative, Supportive family, Willingly participates in therapeutic activities  Barriers: Decreased endurance, Generalized weakness, Impaired balance, Limited mobility    Plan    UE strengthening  [place] and safety with ADLs/IADLs  [place] and safety with transfers    DME  OT DME Recommendations  Bathroom Equipment:  (shower chair)    Passport items to be completed:  Perform bathroom transfers, complete dressing, complete feeding, get ready for " the day, prepare a simple meal, participate in household tasks, adapt home for safety needs, demonstrate home exercise program, complete caregiver training     Occupational Therapy Goals (Active)       Problem: Bathing       Dates: Start:  07/25/24         Goal: STG-Within one week, patient will bathe with min assist using LRAD as needed       Dates: Start:  07/25/24               Problem: Dressing       Dates: Start:  07/25/24         Goal: STG-Within one week, patient will dress UB with min assist using LRAD as needed       Dates: Start:  07/25/24            Goal: STG-Within one week, patient will dress LB with min assist using LRAD as needed       Dates: Start:  07/25/24               Problem: Functional Transfers       Dates: Start:  07/25/24         Goal: STG-Within one week, patient will transfer to toilet at SPV level using LRAD as needed       Dates: Start:  07/25/24               Problem: OT Long Term Goals       Dates: Start:  07/25/24         Goal: LTG-By discharge, patient will complete basic self care tasks at mod I level using LRAD as needed       Dates: Start:  07/25/24            Goal: LTG-By discharge, patient will perform bathroom transfers at mod I level using LRAD as needed       Dates: Start:  07/25/24

## 2024-07-29 NOTE — PROGRESS NOTES
NURSING DAILY NOTE    Name: Gali Broderick   Date of Admission: 7/23/2024   Admitting Diagnosis: Multiple trauma  Attending Physician: Derik Hays M.d.  Allergies: Iodine    Safety  Patient Assist  min  Patient Precautions  Weight Bearing As Tolerated Left Lower Extremity, Non Weight Bearing Right Upper Extremity  Precaution Comments  Orthostatic hypotension  Bed Transfer Status  Contact Guard Assist  Toilet Transfer Status   Contact Guard Assist  Assistive Devices  Rails, Wheelchair, Other (Comments)  Oxygen  None - Room Air  Diet/Therapeutic Dining  Current Diet Order   Procedures    Diet Order Diet: Regular     Pill Administration  whole  Agitated Behavioral Scale     ABS Level of Severity       Fall Risk  Has the patient had a fall this admission?   No  Fifi Evangelista Fall Risk Scoring  15, HIGH RISK  Fall Risk Safety Measures  bed alarm, chair alarm, and poor balance    Vitals  Temperature: 36.3 °C (97.4 °F)  Temp src: Oral  Pulse: 69  Respiration: 20  Blood Pressure : 131/51  Blood Pressure MAP (Calculated): 78 MM HG  BP Location: Left, Upper Arm  Patient BP Position: Supine     Oxygen  Pulse Oximetry: 93 %  O2 (LPM): 0  O2 Delivery Device: None - Room Air    Bowel and Bladder  Last Bowel Movement  07/29/24  Stool Type  Type 4: Like a sausage or snake, smooth and soft  Bowel Device  Bathroom  Continent  Bladder: Did not void   Bowel: No movement  Bladder Function  Urine Void (mL):  (moderate)  Number of Times Voided: 1  Urine Color: Unable To Evaluate  Genitourinary Assessment   Bladder Assessment (WDL):  Within Defined Limits  Ariza Catheter: Not Applicable  Urine Color: Unable To Evaluate  Bladder Device: Bathroom  Bladder Scan: Post Void  $ Bladder Scan Results (mL): 6    Skin  Sascha Score   17  Sensory Interventions   Skin Preventative Measures: Pillows in Use to Float Heels, Pillows in Use for Support / Positioning  Moisture  Interventions  Moisturizers/Barriers: Barrier Wipes      Pain  Pain Rating Scale  4 - Distracts me, can do usual activities  Pain Location  Shoulder  Pain Location Orientation  Right  Pain Interventions   Rest    ADLs    Bathing    (shower with ot)  Linen Change      Personal Hygiene  Moist Estefany Wipes, Perineal Care  Chlorhexidine Bath      Oral Care     Teeth/Dentures     Shave     Nutrition Percentage Eaten  Lunch, Between 25-50% Consumed  Environmental Precautions  Treaded Slipper Socks on Patient, Personal Belongings, Wastebasket, Call Bell etc. in Easy Reach, Transferred to Stronger Side, Bed in Low Position  Patient Turns/Positioning  Patient Turns Self from Side to Side  Patient Turns Assistance/Tolerance     Bed Positions  Bed Controls On  Head of Bed Elevated  Self regulated      Psychosocial/Neurologic Assessment  Psychosocial Assessment  Psychosocial (WDL):  Within Defined Limits  Neurologic Assessment  Neuro (WDL): Exceptions to WDL  Level of Consciousness: Alert  Orientation Level: Oriented X4  Cognition: Appropriate judgement, Follows commands  Speech: Clear  Motor Function/Sensation Assessment: Sensation  RUE Sensation: Tingling, Pain (non-op fx)  LLE Sensation: Tingling, Pain, Other (Comment) (L tibial shaft fx with IM nailing)  EENT (WDL):  Within Defined Limits    Cardio/Pulmonary Assessment  Edema   RUE Edema: Generalized  LLE Edema: Generalized  Respiratory Breath Sounds  RUL Breath Sounds: Clear  RML Breath Sounds: Clear  RLL Breath Sounds: Clear  BERTA Breath Sounds: Clear  LLL Breath Sounds: Clear  Cardiac Assessment   Cardiac (WDL):  WDL Except (aortic root dilation, moderate aortic insuffiency, DLD, HTN)

## 2024-07-29 NOTE — PROGRESS NOTES
NURSING DAILY NOTE    Name: Gali Broderick   Date of Admission: 7/23/2024   Admitting Diagnosis: Multiple trauma  Attending Physician: Derik Hays M.d.  Allergies: Iodine    Safety  Patient Assist  mod assist  Patient Precautions  Weight Bearing As Tolerated Left Lower Extremity, Non Weight Bearing Right Upper Extremity  Precaution Comments  Orthostatic hypotension  Bed Transfer Status  Standby Assist  Toilet Transfer Status   Contact Guard Assist  Assistive Devices  Rails, Wheelchair, Wheelchair push  Oxygen  None - Room Air  Diet/Therapeutic Dining  Current Diet Order   Procedures    Diet Order Diet: Regular     Pill Administration  whole  Agitated Behavioral Scale     ABS Level of Severity       Fall Risk  Has the patient had a fall this admission?   No  Fifi Evangelista Fall Risk Scoring  15, HIGH RISK  Fall Risk Safety Measures  bed alarm, chair alarm, and poor balance    Vitals  Temperature: 36.3 °C (97.3 °F)  Temp src: Oral  Pulse: (!) 110  Respiration: 16  Blood Pressure : 105/66  Blood Pressure MAP (Calculated): 79 MM HG  BP Location: Left, Upper Arm  Patient BP Position: Sitting     Oxygen  Pulse Oximetry: 94 %  O2 (LPM): 0  O2 Delivery Device: None - Room Air    Bowel and Bladder  Last Bowel Movement  07/28/24  Stool Type  Type 4: Like a sausage or snake, smooth and soft  Bowel Device  Bathroom  Continent  Bladder: Did not void   Bowel: No movement  Bladder Function  Urine Void (mL):  (moderate)  Number of Times Voided: 1  Urine Color: Pale  Genitourinary Assessment   Bladder Assessment (WDL):  Within Defined Limits  Ariza Catheter: Not Applicable  Urine Color: Pale  Bladder Device: Bathroom  Bladder Scan: Post Void  $ Bladder Scan Results (mL): 6    Skin  Sascha Score   18  Sensory Interventions   Skin Preventative Measures: Pillows in Use for Support / Positioning  Moisture Interventions  Moisturizers/Barriers: Barrier Wipes      Pain  Pain  Rating Scale  3 - Sometimes distracts me  Pain Location  Leg  Pain Location Orientation  Left, Lower  Pain Interventions   Rest    ADLs    Bathing    (shower with ot)  Linen Change      Personal Hygiene  Moist Estefany Wipes, Perineal Care  Chlorhexidine Bath      Oral Care     Teeth/Dentures     Shave     Nutrition Percentage Eaten  *  * Meal *  *, Dinner, Less than 25% Consumed  Environmental Precautions  Bed in Low Position, Treaded Slipper Socks on Patient  Patient Turns/Positioning  Patient Turns Self from Side to Side  Patient Turns Assistance/Tolerance     Bed Positions  Bed Controls On  Head of Bed Elevated  Self regulated      Psychosocial/Neurologic Assessment  Psychosocial Assessment  Psychosocial (WDL):  Within Defined Limits  Neurologic Assessment  Neuro (WDL): Exceptions to WDL  Level of Consciousness: Alert  Orientation Level: Oriented X4  Cognition: Appropriate judgement, Follows commands  Speech: Clear  Motor Function/Sensation Assessment: Sensation  RUE Sensation: Tingling, Pain (fx non-op)  LLE Sensation: Tingling, Pain, Other (Comment) (7/16 L tibial shaft fx IM nailing)  EENT (WDL):  Within Defined Limits    Cardio/Pulmonary Assessment  Edema   RUE Edema: Generalized  LLE Edema: Generalized  Respiratory Breath Sounds  RUL Breath Sounds: Clear  RML Breath Sounds: Clear  RLL Breath Sounds: Clear  BERTA Breath Sounds: Clear  LLL Breath Sounds: Clear  Cardiac Assessment   Cardiac (WDL):  WDL Except (aortic root dilation, moderate aortic insuffiency, DLD, HTN)

## 2024-07-29 NOTE — PROGRESS NOTES
"  Physical Medicine & Rehabilitation Progress Note    Encounter Date: 7/29/2024    Chief Complaint: Weakness    Interval Events (Subjective):  Seen in bed. Pain controlled. Tolerating therapy. No new concerns    Objective:  VITAL SIGNS: BP (P) 115/78   Pulse 75   Temp 36.3 °C (97.3 °F) (Temporal)   Resp 18   Ht 1.626 m (5' 4\")   Wt 76.5 kg (168 lb 10.4 oz)   SpO2 95%   BMI 28.95 kg/m²   Gen: No acute distress, well developed well nourished adult  HEENT: Normal Cephalic Atraumatic, Normal conjunctiva.   CV: warm extremities, well perfused, no edema  Resp: symmetric chest rise, breathing comfortably on room air  Abd: Soft, Non distended  Extremities: normal bulk, no atrophy  Skin: no visible rashes or lesions.   Neuro: alert, awake  Psych: Mood and affect appropriate and congruent    Laboratory Values:  No results found for this or any previous visit (from the past 72 hour(s)).    Medications:  Scheduled Medications   Medication Dose Frequency    acetaminophen  1,000 mg TID    Pharmacy Consult Request  1 Each PHARMACY TO DOSE    senna-docusate  2 Tablet BID    omeprazole  20 mg DAILY    enoxaparin (LOVENOX) injection  40 mg DAILY AT 1800     PRN medications: Respiratory Therapy Consult, hydrALAZINE, senna-docusate **AND** polyethylene glycol/lytes, ondansetron **OR** ondansetron, sodium chloride, oxyCODONE immediate-release **OR** oxyCODONE immediate-release    Diet:  Current Diet Order   Procedures    Diet Order Diet: Regular       Medical Decision Making and Plan:  Fracture of right (dominant side) humerus   - 3 part proximal humerus fracture   - s/p closed manipulation and treatment by Dr. Ramírez Denney MD on 7/16   - NWB RUE   - PT/OT continue     Fracture of left tibia   - Comminuted proximal tibia fracture   - S/p Open reduction internal fixation left tibial shaft with intramedullary nail by Dr. Ramírez Denney MD on 7/16   - WBAT LLE   - PT/OT while in house   - good candidate for IPR     Acute blood " loss anemia   - Hgb 9.6, continues to drop   -Possible hematoma in left leg?  - Checking Ferritin, Iron studies      Orthostatic Hypotension   - Secondary to acute blood loss anemia   - IV fluids   - JACKIE hose   - Abdominal binder  -7/25- orthostatic in therapy in the am. Hold telmisartan  -7/26- DC telmisartab     Uncontrolled neuropathic pain   - 7/24- stop gabapentin     Hypertension   - was on Telmisartan 40mg Q evening   -DC telmisartan due to low bp     Circadian Rhythm disorder:   Recommend lights on during the day/off at night, minimize nighttime interruptions as able.     Mood  - at risk of adjustment disorder, depression, and anxiety due to functional decline     ID:  - at risk for Urinary tract infection     Skin/Wounds:  - Pressure relief q2h while in bed. Close monitoring for signs of breakdown     Pain:  - Neuroceptic - On Tylenol prn, continue oxycodone prn  - Neuropathic - DC gabapentin 100mg TID     DVT prophylaxis:  continue lovenox     GI prophylaxis:  On Prilosec 20mg daily         -Follow-up Ortho, PCP    ____________________________________    Derik Hays MD  Physical Medicine & Rehabilitation   Brain Injury Medicine   ____________________________________

## 2024-07-29 NOTE — PROGRESS NOTES
NURSING DAILY NOTE    Name: Gali Broderick   Date of Admission: 7/23/2024   Admitting Diagnosis: Multiple trauma  Attending Physician: Derik Hays M.d.  Allergies: Iodine    Safety  Patient Assist  min  Patient Precautions  Weight Bearing As Tolerated Left Lower Extremity, Non Weight Bearing Right Upper Extremity  Precaution Comments  Orthostatic hypotension  Bed Transfer Status  Standby Assist  Toilet Transfer Status   Contact Guard Assist  Assistive Devices  Rails, Wheelchair, Other (Comments)  Oxygen  None - Room Air  Diet/Therapeutic Dining  Current Diet Order   Procedures    Diet Order Diet: Regular     Pill Administration  whole  Agitated Behavioral Scale     ABS Level of Severity       Fall Risk  Has the patient had a fall this admission?   No  Fifi Evangelista Fall Risk Scoring  15, HIGH RISK  Fall Risk Safety Measures  bed alarm and chair alarm    Vitals  Temperature: 36.3 °C (97.3 °F)  Temp src: Temporal  Pulse: 75  Respiration: 18  Blood Pressure : 126/56  Blood Pressure MAP (Calculated): 79 MM HG  BP Location: Left, Upper Arm  Patient BP Position: Supine     Oxygen  Pulse Oximetry: 95 %  O2 (LPM): 0  O2 Delivery Device: None - Room Air    Bowel and Bladder  Last Bowel Movement  07/28/24  Stool Type  Type 4: Like a sausage or snake, smooth and soft  Bowel Device  Bathroom  Continent  Bladder: Did not void   Bowel: No movement  Bladder Function  Urine Void (mL):  (moderate)  Number of Times Voided: 1  Urine Color: Yellow  Genitourinary Assessment   Bladder Assessment (WDL):  Within Defined Limits  Ariza Catheter: Not Applicable  Urine Color: Yellow  Bladder Device: Bathroom  Bladder Scan: Post Void  $ Bladder Scan Results (mL): 6    Skin  Sascha Score   18  Sensory Interventions   Skin Preventative Measures: Pillows in Use to Float Heels, Pillows in Use for Support / Positioning  Moisture Interventions  Moisturizers/Barriers: Barrier  Wipes      Pain  Pain Rating Scale  2 - Notice Pain, does not interfere with activities  Pain Location  Leg  Pain Location Orientation  Left, Lower  Pain Interventions   Rest    ADLs    Bathing    (shower with ot)  Linen Change      Personal Hygiene  Moist Estefany Wipes, Perineal Care  Chlorhexidine Bath      Oral Care     Teeth/Dentures     Shave     Nutrition Percentage Eaten  *  * Meal *  *, Dinner, Less than 25% Consumed  Environmental Precautions  Treaded Slipper Socks on Patient, Personal Belongings, Wastebasket, Call Bell etc. in Easy Reach, Transferred to Stronger Side, Bed in Low Position  Patient Turns/Positioning  Patient Turns Self from Side to Side  Patient Turns Assistance/Tolerance     Bed Positions  Bed Controls On  Head of Bed Elevated  Self regulated      Psychosocial/Neurologic Assessment  Psychosocial Assessment  Psychosocial (WDL):  Within Defined Limits  Neurologic Assessment  Neuro (WDL): Exceptions to WDL  Level of Consciousness: Alert  Orientation Level: Oriented X4  Cognition: Appropriate judgement, Follows commands  Speech: Clear  Motor Function/Sensation Assessment: Sensation  RUE Sensation: Tingling, Pain (non-op fx)  LLE Sensation: Tingling, Pain, Other (Comment) (L tibial shaft fx with IM nailing)  EENT (WDL):  Within Defined Limits    Cardio/Pulmonary Assessment  Edema   RUE Edema: Generalized  LLE Edema: Generalized  Respiratory Breath Sounds  RUL Breath Sounds: Clear  RML Breath Sounds: Clear  RLL Breath Sounds: Clear  BERTA Breath Sounds: Clear  LLL Breath Sounds: Clear  Cardiac Assessment   Cardiac (WDL):  WDL Except (aortic root dilation, moderate aortic insuffiency, DLD, HTN)

## 2024-07-29 NOTE — CARE PLAN
Problem: Wound/ / Incision Healing  Goal: Patient's wound/surgical incision will decrease in size and heals properly  Outcome: Progressing   Wound care evaluated possible pressure area on heel. See note. Moisture area to buttocks. Moisture barrier cream applied.

## 2024-07-29 NOTE — THERAPY
"Occupational Therapy  Daily Treatment     Patient Name: Gali Broderick  Age:  79 y.o., Sex:  female  Medical Record #: [id number]  Today's Date: 7/29/2024     Precautions  Precautions: Weight Bearing As Tolerated Left Lower Extremity, Non Weight Bearing Right Upper Extremity  Comments: Orthostatic hypotension         Subjective    \"I need to lean on my left arm\"     Objective       07/29/24 1431   OT Charge Group   OT Therapy Activity (Units) 1   OT Therapeutic Exercise (Units) 1   OT Total Time Spent   OT Individual Total Time Spent (Mins) 30   Sitting Upper Body Exercises   Shoulder Press 3 sets of 10;Left  (3lb)   Bicep Curls 3 sets of 10;Left  (4lb)   IADL Treatments   IADL Treatments Meal preparation   Meal Preparation attempted standing to crack two eggs into bowl; however pt unable to stand without UE support.Instead sat down for task. Pt mixed the eggs and cooked scrambled eggs on stove top with setup assistance.         Assessment    Pt seen for OT tx session. Unable to tolerate standing without UE support, so she needed to complete activity from WC. Pt will benefit from continued UE strengthening and balance practice.   Strengths: Able to follow instructions, Alert and oriented, Effective communication skills, Independent prior level of function, Manages pain appropriately, Motivated for self care and independence, Pleasant and cooperative, Supportive family, Willingly participates in therapeutic activities  Barriers: Decreased endurance, Generalized weakness, Impaired balance, Limited mobility    Plan    UE strengthening  [place] and safety with ADLs/IADLs  [place] and safety with transfers      DME  OT DME Recommendations  Bathroom Equipment:  (shower chair)    Passport items to be completed:  Perform bathroom transfers, complete dressing, complete feeding, get ready for the day, prepare a simple meal, participate in household tasks, adapt home for safety needs, demonstrate home exercise " program, complete caregiver training     Occupational Therapy Goals (Active)       Problem: Bathing       Dates: Start:  07/25/24         Goal: STG-Within one week, patient will bathe with min assist using LRAD as needed       Dates: Start:  07/25/24               Problem: Dressing       Dates: Start:  07/25/24         Goal: STG-Within one week, patient will dress UB with min assist using LRAD as needed       Dates: Start:  07/25/24            Goal: STG-Within one week, patient will dress LB with min assist using LRAD as needed       Dates: Start:  07/25/24               Problem: Functional Transfers       Dates: Start:  07/25/24         Goal: STG-Within one week, patient will transfer to toilet at SPV level using LRAD as needed       Dates: Start:  07/25/24               Problem: OT Long Term Goals       Dates: Start:  07/25/24         Goal: LTG-By discharge, patient will complete basic self care tasks at mod I level using LRAD as needed       Dates: Start:  07/25/24            Goal: LTG-By discharge, patient will perform bathroom transfers at mod I level using LRAD as needed       Dates: Start:  07/25/24

## 2024-07-29 NOTE — WOUND TEAM
[facility]  Inpatient Services  Wound and Skin Care Brief Evaluation    Admission Date: 7/23/2024     Last order of IP CONSULT TO WOUND CARE was found on 7/29/2024 from Hospital Encounter on 7/23/2024     HPI, PMH, SH: Reviewed    No chief complaint on file.    Diagnosis: Multiple trauma [T07.XXXA]    Unit where seen by Wound Team: [id number]/01     Wound consult placed regarding Left heel. Chart and images reviewed. This discussed with bedside RN Eulalia. This clinician in to assess patient. Patient pleasant and agreeable. Let heel is blanching. Patient denies pain at this time. Education provided for proper fitting shoes, offloading heel and blanching vs non-blanching. Patient verbalized understanding. Non-selectively debrided with N/A.     No pressure injuries or advanced wound care needs identified. Wound consult completed. No further follow up unless indicated and consulted.       While in room, also reassessed Sacrum. All areas are blanching, appears increase in moisture to area. Orders changed to barrier paste, offloading and not sitting on sacrum when possible. Patient verbalized understanding.   Wound 07/23/24 Coccyx (Active)   Date First Assessed/Time First Assessed: 07/23/24 1648   Present on Original Admission: Yes  Hand Hygiene Completed: Yes  Location: Coccyx      Assessments 7/29/2024 11:00 AM   Wound Image     Site Assessment Red   Periwound Assessment Blanchable erythema   Margins Defined edges   Closure Open to air;Secondary intention   Drainage Amount Scant   Drainage Description Serous   Treatments Site care;Cleansed;Offloading   Offloading/DME Other (comment)   Wound Cleansing Approved Wound Cleanser   Periwound Protectant Barrier Paste   Dressing Status Open to Air   Dressing Changed Reapplied   Dressing Options Open to Air;Other (Comments)   Dressing Change/Treatment Frequency As Needed   NEXT Weekly Photo (Inpatient Only) 08/04/24   Wound Team Following Weekly   Shape Oval   WOUND  NURSE ONLY - Time Spent with Patient (mins) 30       PREVENTATIVE INTERVENTIONS:    Q shift Sascha - performed per nursing policy  Q shift pressure point assessments - performed per nursing policy    Surface/Positioning  Waffle overlay  - Currently in Place    Offloading/Redistribution  Heels floated with waffle overlay - Currently in Place  Float Heels off Bed with Pillows - Currently in Place       Moon Boots - Currently in Place      Mobilization      Working with therapies

## 2024-07-29 NOTE — THERAPY
Physical Therapy   Daily Treatment     Patient Name: Gali Broderick  Age:  79 y.o., Sex:  female  Medical Record #: [id number]  Today's Date: 7/29/2024     Precautions  Precautions: Weight Bearing As Tolerated Left Lower Extremity, Non Weight Bearing Right Upper Extremity  Comments: Orthostatic hypotension    Subjective    Session 1 (3165-1040): Pt received in chair and willing to participate in therapy.  Session 2 (9041-4850): Pt received in bed and willing to participate in therapy.     Objective       07/29/24 0830   PT Charge Group   PT Therapeutic Activities (Units) 2   PT Total Time Spent   PT Individual Total Time Spent (Mins) 30   Precautions   Precautions Weight Bearing As Tolerated Left Lower Extremity;Non Weight Bearing Right Upper Extremity   Comments Orthostatic hypotension   Vitals   Patient BP Position Sitting   Blood Pressure  113/68   O2 Delivery Device None - Room Air   Vitals Comments unable to complete standing BP due to lightheaded symptoms; applied TEDs and abdominal binder that relieved symptoms   Pain   Intervention Rest   Pain 0 - 10 Group   Location Shoulder   Location Orientation Right   Therapist Pain Assessment During Activity;Post Activity Pain Same as Prior to Activity   Standing Lower Body Exercises   Other Exercises Static standing w/ kari walker, 2 x 20 seconds w/ cues to increase L weightbearing   Interdisciplinary Plan of Care Collaboration   IDT Collaboration with  Nursing;Certified Nursing Assistant   Patient Position at End of Therapy Seated;Chair Alarm On;Tray Table within Reach;Phone within Reach;Call Light within Reach   Collaboration Comments notified RN about pt's request for pain meds   Physical Therapist Assigned   Assigned PT / Treatment Time / Comments EB/JR/90min   Strengths & Barriers   Strengths Able to follow instructions;Alert and oriented;Effective communication skills;Independent prior level of function;Motivated for self care and independence;Pleasant and  cooperative;Willingly participates in therapeutic activities;Supportive family   Barriers Decreased endurance;Fatigue;Generalized weakness;Impaired activity tolerance;Impaired balance;Limited mobility;Pain;Impulsive     ModA for LB and UB dressing; MaxA for doff and don of arm sling during dressing       07/29/24 1301   PT Charge Group   PT Therapeutic Exercise (Units) 3   PT Therapeutic Activities (Units) 1   PT Total Time Spent   PT Individual Total Time Spent (Mins) 60   Precautions   Precautions Weight Bearing As Tolerated Left Lower Extremity;Non Weight Bearing Right Upper Extremity   Comments Orthostatic hypotension   Vitals   Patient BP Position Sitting   Blood Pressure  108/57   O2 Delivery Device None - Room Air   Vitals Comments standing BP: 81/56 w/ c/o dizziness; pt wearing JACKIE hose and abdominal binder throughout session including during readings listed   Wheelchair Functional Level of Assist   Wheelchair Assist Stand by Assist   Distance Wheelchair (Feet or Distance) 50   Wheelchair Description Extra time;Leg rest management;Impaired coordination;Limited by fatigue  (RLE and LUE management)   Transfer Functional Level of Assist   Bed, Chair, Wheelchair Transfer Contact Guard Assist   Bed Chair Wheelchair Transfer Description Increased time;Set-up of equipment;Verbal cueing  (stand pivot bed>w/c to R; pt prefers to transfer to L)   Sitting Lower Body Exercises   Ankle Pumps 2 sets of 10;Bilateral   Hip Abduction 2 sets of 10;Bilateral;Light Resistance Theraband  (orange theraband)   Long Arc Quad 2 sets of 10;Right;Left   Marching Reciprocal;2 sets of 10   Hamstring Curl 2 sets of 10;Right;Left;Light Resistance Theraband  (orange theraband)   Comments seated HEP provided   Standing Lower Body Exercises   Heel Rise   (3 sets of 5 w/ kari-walker each LE w/ seated rest break between)   Bed Mobility    Supine to Sit Supervised   Sit to Stand Contact Guard Assist   Interdisciplinary Plan of Care  Collaboration   IDT Collaboration with  Family / Caregiver   Patient Position at End of Therapy Seated;Chair Alarm On;Call Light within Reach;Tray Table within Reach;Phone within Reach   Collaboration Comments pt's  present throughout session   PT DME Recommendations   Assistive Device Jelani Walker   Physical Therapist Assigned   Assigned PT / Treatment Time / Comments EB/JR/90min   Strengths & Barriers   Strengths Able to follow instructions;Alert and oriented;Effective communication skills;Independent prior level of function;Motivated for self care and independence;Pleasant and cooperative;Willingly participates in therapeutic activities;Supportive family   Barriers Decreased endurance;Fatigue;Generalized weakness;Impaired activity tolerance;Impaired balance;Limited mobility;Pain;Impulsive     Standing lateral weight shifts w/ jelani-walker, 2 x 5 each direction w/ seated rest breaks between    Assessment    Pt limited in standing activities due to symptomatic orthostatic hypotension. Pt completed seated HEP w/ limited cues required during second set. Pt demonstrates decreased weight bearing on LLE during transfer tasks.    Strengths: Able to follow instructions, Alert and oriented, Effective communication skills, Independent prior level of function, Motivated for self care and independence, Pleasant and cooperative, Willingly participates in therapeutic activities, Supportive family  Barriers: Decreased endurance, Fatigue, Generalized weakness, Impaired activity tolerance, Impaired balance, Limited mobility, Pain, Impulsive    Plan    Progressing to gait with jelani walker  Strengthening in all planes  Balance  Transfer safety  Car transfers and stairs as able    DME  PT DME Recommendations  Assistive Device: Jelani Walker    Passport items to be completed:  Get in/out of bed safely, in/out of a vehicle, safely use mobility device, walk or wheel around home/community, navigate up and down stairs, show how to get  up/down from the ground, ensure home is accessible, demonstrate HEP, complete caregiver training    Physical Therapy Problems (Active)       Problem: Balance       Dates: Start:  07/24/24         Goal: STG-Within one week, patient will maintain dynamic standing during AP and lateral weight shifts on firm surface for one min with contact guard assist.        Dates: Start:  07/24/24            Goal: STG-Within one week, patient will tolerate caputo balance test.       Dates: Start:  07/24/24               Problem: Mobility       Dates: Start:  07/24/24         Goal: STG-Within one week, patient will ambulate 10 ft using hemiwalker with contact guard assist.        Dates: Start:  07/24/24            Goal: STG-Within one week, patient will ambulate up/down a curb using hemiwalker and contact guard assist.        Dates: Start:  07/24/24               Problem: Mobility Transfers       Dates: Start:  07/24/24         Goal: STG-Within one week, patient will transfer bed to chair with stand pivot transfer safely with supervision assist.        Dates: Start:  07/24/24               Problem: PT-Long Term Goals       Dates: Start:  07/24/24         Goal: LTG-By discharge, patient will receive >35/56 on caputo balance test.        Dates: Start:  07/24/24            Goal: LTG-By discharge, patient will ambulate 100 ft using hemiwalker or LRAD with supervision assist.        Dates: Start:  07/24/24            Goal: LTG-By discharge, patient will perform home exercise program using handouts independently.        Dates: Start:  07/24/24            Goal: LTG-By discharge, patient will transfer in/out of a car using hemiwalker with supervision assist.        Dates: Start:  07/24/24

## 2024-07-30 ENCOUNTER — APPOINTMENT (OUTPATIENT)
Dept: PHYSICAL THERAPY | Facility: REHABILITATION | Age: 79
DRG: 560 | End: 2024-07-30
Attending: PHYSICAL MEDICINE & REHABILITATION
Payer: COMMERCIAL

## 2024-07-30 ENCOUNTER — APPOINTMENT (OUTPATIENT)
Dept: OCCUPATIONAL THERAPY | Facility: REHABILITATION | Age: 79
DRG: 560 | End: 2024-07-30
Attending: PHYSICAL MEDICINE & REHABILITATION
Payer: COMMERCIAL

## 2024-07-30 LAB
ALBUMIN SERPL BCP-MCNC: 3.5 G/DL (ref 3.2–4.9)
ALBUMIN/GLOB SERPL: 1.3 G/DL
ALP SERPL-CCNC: 286 U/L (ref 30–99)
ALT SERPL-CCNC: 102 U/L (ref 2–50)
ANION GAP SERPL CALC-SCNC: 12 MMOL/L (ref 7–16)
AST SERPL-CCNC: 65 U/L (ref 12–45)
BILIRUB SERPL-MCNC: 0.4 MG/DL (ref 0.1–1.5)
BUN SERPL-MCNC: 23 MG/DL (ref 8–22)
CALCIUM ALBUM COR SERPL-MCNC: 9.5 MG/DL (ref 8.5–10.5)
CALCIUM SERPL-MCNC: 9.1 MG/DL (ref 8.5–10.5)
CHLORIDE SERPL-SCNC: 106 MMOL/L (ref 96–112)
CO2 SERPL-SCNC: 23 MMOL/L (ref 20–33)
CREAT SERPL-MCNC: 0.72 MG/DL (ref 0.5–1.4)
ERYTHROCYTE [DISTWIDTH] IN BLOOD BY AUTOMATED COUNT: 46.2 FL (ref 35.9–50)
GFR SERPLBLD CREATININE-BSD FMLA CKD-EPI: 84 ML/MIN/1.73 M 2
GLOBULIN SER CALC-MCNC: 2.8 G/DL (ref 1.9–3.5)
GLUCOSE SERPL-MCNC: 107 MG/DL (ref 65–99)
HCT VFR BLD AUTO: 30.8 % (ref 37–47)
HGB BLD-MCNC: 10.1 G/DL (ref 12–16)
MCH RBC QN AUTO: 30.8 PG (ref 27–33)
MCHC RBC AUTO-ENTMCNC: 32.8 G/DL (ref 32.2–35.5)
MCV RBC AUTO: 93.9 FL (ref 81.4–97.8)
PLATELET # BLD AUTO: 220 K/UL (ref 164–446)
PMV BLD AUTO: 10.5 FL (ref 9–12.9)
POTASSIUM SERPL-SCNC: 3.9 MMOL/L (ref 3.6–5.5)
PROT SERPL-MCNC: 6.3 G/DL (ref 6–8.2)
RBC # BLD AUTO: 3.28 M/UL (ref 4.2–5.4)
SODIUM SERPL-SCNC: 141 MMOL/L (ref 135–145)
WBC # BLD AUTO: 5.8 K/UL (ref 4.8–10.8)

## 2024-07-30 PROCEDURE — A9270 NON-COVERED ITEM OR SERVICE: HCPCS | Performed by: PHYSICAL MEDICINE & REHABILITATION

## 2024-07-30 PROCEDURE — 97110 THERAPEUTIC EXERCISES: CPT

## 2024-07-30 PROCEDURE — 97530 THERAPEUTIC ACTIVITIES: CPT

## 2024-07-30 PROCEDURE — 80053 COMPREHEN METABOLIC PANEL: CPT

## 2024-07-30 PROCEDURE — 700102 HCHG RX REV CODE 250 W/ 637 OVERRIDE(OP): Performed by: PHYSICAL MEDICINE & REHABILITATION

## 2024-07-30 PROCEDURE — 97116 GAIT TRAINING THERAPY: CPT

## 2024-07-30 PROCEDURE — 36415 COLL VENOUS BLD VENIPUNCTURE: CPT

## 2024-07-30 PROCEDURE — 85027 COMPLETE CBC AUTOMATED: CPT

## 2024-07-30 PROCEDURE — 97535 SELF CARE MNGMENT TRAINING: CPT

## 2024-07-30 PROCEDURE — 770010 HCHG ROOM/CARE - REHAB SEMI PRIVAT*

## 2024-07-30 PROCEDURE — 700111 HCHG RX REV CODE 636 W/ 250 OVERRIDE (IP): Mod: JZ | Performed by: PHYSICAL MEDICINE & REHABILITATION

## 2024-07-30 RX ORDER — MIDODRINE HYDROCHLORIDE 2.5 MG/1
5 TABLET ORAL
Status: DISCONTINUED | OUTPATIENT
Start: 2024-07-30 | End: 2024-08-07

## 2024-07-30 RX ORDER — MIDODRINE HYDROCHLORIDE 2.5 MG/1
2.5 TABLET ORAL
Status: DISCONTINUED | OUTPATIENT
Start: 2024-07-30 | End: 2024-07-30

## 2024-07-30 RX ADMIN — ACETAMINOPHEN 1000 MG: 500 TABLET, FILM COATED ORAL at 12:43

## 2024-07-30 RX ADMIN — MIDODRINE HYDROCHLORIDE 5 MG: 2.5 TABLET ORAL at 12:43

## 2024-07-30 RX ADMIN — OMEPRAZOLE 20 MG: 20 CAPSULE, DELAYED RELEASE ORAL at 08:02

## 2024-07-30 RX ADMIN — ACETAMINOPHEN 1000 MG: 500 TABLET, FILM COATED ORAL at 08:02

## 2024-07-30 RX ADMIN — MIDODRINE HYDROCHLORIDE 5 MG: 2.5 TABLET ORAL at 17:15

## 2024-07-30 RX ADMIN — ENOXAPARIN SODIUM 40 MG: 100 INJECTION SUBCUTANEOUS at 17:14

## 2024-07-30 RX ADMIN — ACETAMINOPHEN 1000 MG: 500 TABLET, FILM COATED ORAL at 21:25

## 2024-07-30 ASSESSMENT — GAIT ASSESSMENTS
DISTANCE (FEET): 1
GAIT LEVEL OF ASSIST: CONTACT GUARD ASSIST
DEVIATION: ANTALGIC;STEP TO;BRADYKINETIC;DECREASED TOE OFF
GAIT LEVEL OF ASSIST: CONTACT GUARD ASSIST
DISTANCE (FEET): 2
ASSISTIVE DEVICE: PARALLEL BARS
DEVIATION: ANTALGIC;DECREASED TOE OFF
ASSISTIVE DEVICE: PARALLEL BARS

## 2024-07-30 ASSESSMENT — ACTIVITIES OF DAILY LIVING (ADL)
BED_CHAIR_WHEELCHAIR_TRANSFER_DESCRIPTION: INCREASED TIME;INITIAL PREPARATION FOR TASK;SET-UP OF EQUIPMENT
TOILET_TRANSFER_DESCRIPTION: GRAB BAR;INCREASED TIME;INITIAL PREPARATION FOR TASK;SUPERVISION FOR SAFETY

## 2024-07-30 ASSESSMENT — PAIN DESCRIPTION - PAIN TYPE: TYPE: ACUTE PAIN

## 2024-07-30 NOTE — THERAPY
"Occupational Therapy  Daily Treatment     Patient Name: Gali Broderick  Age:  79 y.o., Sex:  female  Medical Record #: [id number]  Today's Date: 7/30/2024     Precautions  Precautions: Weight Bearing As Tolerated Left Lower Extremity, Non Weight Bearing Right Upper Extremity  Comments: Orthostatic hypotension         Subjective    \"That was a good treatment\"     Objective       07/30/24 0931 07/30/24 1015 07/30/24 1501   OT Charge Group   OT Self Care / ADL (Units) 3  --   --    OT Therapy Activity (Units) 1  --   --    OT Therapeutic Exercise (Units)  --   --  2   OT Total Time Spent   OT Individual Total Time Spent (Mins) 60  --  30   Vitals   Patient BP Position  --  Sitting  --    Blood Pressure   --  128/68  --    Functional Level of Assist   Grooming Modified Independent  --   --    Grooming Description Seated in wheelchair at sink  --   --    Upper Body Dressing Minimal Assist  --   --    Upper Body Dressing Description Assit with threading arms through sleeves;Verbal cueing  --   --    Lower Body Dressing Minimal Assist  --   --    Lower Body Dressing Description   (assist for compression socks, assist with R sock)  --   --    Bed, Chair, Wheelchair Transfer Standby Assist  --  Standby Assist   Bed Chair Wheelchair Transfer Description  --   --  Increased time;Initial preparation for task;Set-up of equipment   Sitting Upper Body Exercises   Front Arm Raise  --   --  3 sets of 10;Left  (5lb)   Bicep Curls  --   --  3 sets of 15;Left  (5lb)   Wrist Flexion / Extension  --   --  1 set of 10;Bilateral   Comments  --   --  discussed wrist ROM exercises and stretches   Balance   Comments Standing in // bars and reaching to a target, raising L arm. Pt reported weakness. Able to tolerate standing for a few mins.  --   --    Bed Mobility    Supine to Sit Modified Independent  --   --    Interdisciplinary Plan of Care Collaboration   IDT Collaboration with  Therapy Tech  --   --    Collaboration Comments help fixing " debora  --   --          Assessment    Pt seen for OT tx twice this day. During the first session she was able to stand unsupported for limited amounts of time in order to target functional standing tolerance. Pt is limited by pain in the left leg.   Strengths: Able to follow instructions, Alert and oriented, Effective communication skills, Independent prior level of function, Manages pain appropriately, Motivated for self care and independence, Pleasant and cooperative, Supportive family, Willingly participates in therapeutic activities  Barriers: Decreased endurance, Generalized weakness, Impaired balance, Limited mobility    Plan    Standing tolerance, independence with ADLs/IADLs, independence with functional transfers, UE strengthening    DME  OT DME Recommendations  Bathroom Equipment:  (shower chair)    Passport items to be completed:  Perform bathroom transfers, complete dressing, complete feeding, get ready for the day, prepare a simple meal, participate in household tasks, adapt home for safety needs, demonstrate home exercise program, complete caregiver training     Occupational Therapy Goals (Active)       Problem: Bathing       Dates: Start:  07/25/24         Goal: STG-Within one week, patient will bathe with min assist using LRAD as needed       Dates: Start:  07/25/24               Problem: Dressing       Dates: Start:  07/25/24         Goal: STG-Within one week, patient will dress UB with min assist using LRAD as needed       Dates: Start:  07/25/24            Goal: STG-Within one week, patient will dress LB with min assist using LRAD as needed       Dates: Start:  07/25/24               Problem: Functional Transfers       Dates: Start:  07/25/24         Goal: STG-Within one week, patient will transfer to toilet at SPV level using LRAD as needed       Dates: Start:  07/25/24               Problem: OT Long Term Goals       Dates: Start:  07/25/24         Goal: LTG-By discharge, patient will complete  basic self care tasks at mod I level using LRAD as needed       Dates: Start:  07/25/24            Goal: LTG-By discharge, patient will perform bathroom transfers at mod I level using LRAD as needed       Dates: Start:  07/25/24

## 2024-07-30 NOTE — CARE PLAN
"  Problem: Pain - Standard  Goal: Alleviation of pain or a reduction in pain to the patient’s comfort goal  Outcome: Progressing   Patient is able to rate pain on a scale of 1-10.         Problem: Fall Risk - Rehab  Goal: Patient will remain free from falls  Outcome: Progressing   Fifi Evangelista Fall risk Assessment Score: 15    High fall risk Interventions   - Bed and strip alarm   - Yellow sign by the door   - Yellow wrist band \"Fall risk\"  - Room near to the nurse station  - Do not leave patient unattended in the bathroom  - Fall risk education provided          "

## 2024-07-30 NOTE — PROGRESS NOTES
Patient care assumed. Report received from [facility] CHITRA Gilmore. Patient is alert and calm, resting in bed. Call light and bedside table within reach. Will continue to monitor.

## 2024-07-30 NOTE — THERAPY
Physical Therapy   Daily Treatment     Patient Name: Gali Broderick  Age:  79 y.o., Sex:  female  Medical Record #: [id number]  Today's Date: 7/30/2024     Precautions  Precautions: Weight Bearing As Tolerated Left Lower Extremity, Non Weight Bearing Right Upper Extremity  Comments: Orthostatic hypotension    Subjective    Patient was found seated in wheelchair for morning session and seated EOB for afternoon session and was agreeable to therapy for both sessions. During morning session patient reported some lightheadedness (see vitals below).      Objective       07/30/24 1101 07/30/24 1231   PT Charge Group   PT Therapeutic Activities (Units) 1  --    PT Total Time Spent   PT Individual Total Time Spent (Mins) 30 60   Pain 0 - 10 Group   Location Leg  --    Location Orientation Left  --    Pain Rating Scale (NPRS) 4  --    Description Aching  --    Comfort Goal Perform Activity  --    Gait Functional Level of Assist    Gait Level Of Assist Contact Guard Assist Contact Guard Assist   Assistive Device Parallel Bars Parallel Bars   Distance (Feet) 1 2   # of Times Distance was Traveled 2 3  (able to take 3 steps at once, one time. Took one or two steps the other times.)   Deviation Antalgic;Decreased Toe Off  (Patient was able to take a couple of short steps but needed rest immediately after.) Antalgic;Step To;Bradykinetic;Decreased Toe Off   Transfer Functional Level of Assist   Toilet Transfers Contact Guard Assist  --    Toilet Transfer Description Grab bar;Increased time;Initial preparation for task;Supervision for safety  --    Supine Lower Body Exercise   Bridges  --  2 sets of 10   Hip Abduction  --  1 set of 10   Straight Leg Raises  --  2 sets of 10;Bilateral   Short Arc Quad  --  2 sets of 10;Bilateral   Heel Slide  --  1 set of 10  (min assist to get and hold into available flexion)   Ankle Pumps  --  2 sets of 10   Quadriceps Isometrics  --  1 set of 10  (5s hold)   Sitting Lower Body Exercises   Long Arc  Quad  --  2 sets of 10;Bilateral   Nustep  --  Resistance Level 1  (10 min)   Standing Lower Body Exercises   Other Exercises  --  lateral and anterior/posterior weight shifts   Comments   (weight shifts (lateral and anterior/posterior) able to do about 10 before needing rest)  --    Interdisciplinary Plan of Care Collaboration   IDT Collaboration with   --  Family / Caregiver;Nursing   Patient Position at End of Therapy Seated;Chair Alarm On;Call Light within Reach;Tray Table within Reach;Phone within Reach Seated;Edge of Bed;Bed Alarm On;Call Light within Reach;Tray Table within Reach;Phone within Reach;Family / Friend in Room   Collaboration Comments  --    ( present during treatment, nursing provided tylenol during treatment)   Car Transfer   Assistance Needed  --  Physical assistance;Verbal cues;Supervision;Adaptive equipment   Physical Assistance Level  --  Total assistance   Comment  --  attempted but was unable to get high enough to sit on seat to swing legs around   CARE Score - Car Transfer  --  1        07/30/24 1115 07/30/24 1117   Vitals   Pulse (!) 110 (!) 123   Patient BP Position Sitting Standing 1 minute   Blood Pressure  128/74 99/61       Assessment    Patient was able to work through symptoms of orthostatic hypotension in the morning session. Patient was able to take 3 consecutive steps which is more than in the past but she still struggles to put weight through L leg  due to pain limiting her ability to walk further. Patient demonstrated increased endurance on the nustep and was able to go faster for a longer period without rest breaks. Patient is also gaining increased flexion in the left knee. Patient should continue to practice car transfers once she is able to step up onto a curb/block to reach an adequate height. She will benefit from continued strengthening of the L LE along with gait and endurance training.     Strengths: Able to follow instructions, Alert and oriented, Effective  communication skills, Independent prior level of function, Motivated for self care and independence, Pleasant and cooperative, Willingly participates in therapeutic activities, Supportive family  Barriers: Decreased endurance, Fatigue, Generalized weakness, Impaired activity tolerance, Impaired balance, Limited mobility, Pain, Impulsive    Plan    Progressing to gait with jelani walker  Strengthening in all planes  Balance  Transfer safety  Car transfers and stairs as able    DME  PT DME Recommendations  Assistive Device: Jelani Walker    Passport items to be completed:  Get in/out of bed safely, in/out of a vehicle, safely use mobility device, walk or wheel around home/community, navigate up and down stairs, show how to get up/down from the ground, ensure home is accessible, demonstrate HEP, complete caregiver training    Physical Therapy Problems (Active)       Problem: Balance       Dates: Start:  07/24/24         Goal: STG-Within one week, patient will maintain dynamic standing during AP and lateral weight shifts on firm surface for one min with contact guard assist.        Dates: Start:  07/24/24            Goal: STG-Within one week, patient will tolerate caputo balance test.       Dates: Start:  07/24/24               Problem: Mobility       Dates: Start:  07/24/24         Goal: STG-Within one week, patient will ambulate 10 ft using hemiwalker with contact guard assist.        Dates: Start:  07/24/24            Goal: STG-Within one week, patient will ambulate up/down a curb using hemiwalker and contact guard assist.        Dates: Start:  07/24/24               Problem: Mobility Transfers       Dates: Start:  07/24/24         Goal: STG-Within one week, patient will transfer bed to chair with stand pivot transfer safely with supervision assist.        Dates: Start:  07/24/24               Problem: PT-Long Term Goals       Dates: Start:  07/24/24         Goal: LTG-By discharge, patient will receive >35/56 on caputo  balance test.        Dates: Start:  07/24/24            Goal: LTG-By discharge, patient will ambulate 100 ft using hemiwalker or LRAD with supervision assist.        Dates: Start:  07/24/24            Goal: LTG-By discharge, patient will perform home exercise program using handouts independently.        Dates: Start:  07/24/24            Goal: LTG-By discharge, patient will transfer in/out of a car using hemiwalker with supervision assist.        Dates: Start:  07/24/24

## 2024-07-30 NOTE — PROGRESS NOTES
"  Physical Medicine & Rehabilitation Progress Note    Encounter Date: 7/30/2024    Chief Complaint: Weakness    Interval Events (Subjective):  Orthostatic hypotension the biggest barrier to rehab. Start on midodrine.     Objective:  VITAL SIGNS: BP 99/61   Pulse (!) 123   Temp 36.4 °C (97.6 °F) (Oral)   Resp 18   Ht 1.626 m (5' 4\")   Wt 76.5 kg (168 lb 10.4 oz)   SpO2 95%   BMI 28.95 kg/m²   Gen: No acute distress, well developed well nourished adult  HEENT: Normal Cephalic Atraumatic, Normal conjunctiva.   CV: warm extremities, well perfused, no edema  Resp: symmetric chest rise, breathing comfortably on room air  Abd: Soft, Non distended  Extremities: normal bulk, no atrophy  Skin: no visible rashes or lesions.   Neuro: alert, awake  Psych: Mood and affect appropriate and congruent    Laboratory Values:  Recent Results (from the past 72 hour(s))   CBC WITHOUT DIFFERENTIAL    Collection Time: 07/30/24  5:52 AM   Result Value Ref Range    WBC 5.8 4.8 - 10.8 K/uL    RBC 3.28 (L) 4.20 - 5.40 M/uL    Hemoglobin 10.1 (L) 12.0 - 16.0 g/dL    Hematocrit 30.8 (L) 37.0 - 47.0 %    MCV 93.9 81.4 - 97.8 fL    MCH 30.8 27.0 - 33.0 pg    MCHC 32.8 32.2 - 35.5 g/dL    RDW 46.2 35.9 - 50.0 fL    Platelet Count 220 164 - 446 K/uL    MPV 10.5 9.0 - 12.9 fL   Comp Metabolic Panel    Collection Time: 07/30/24  5:52 AM   Result Value Ref Range    Sodium 141 135 - 145 mmol/L    Potassium 3.9 3.6 - 5.5 mmol/L    Chloride 106 96 - 112 mmol/L    Co2 23 20 - 33 mmol/L    Anion Gap 12.0 7.0 - 16.0    Glucose 107 (H) 65 - 99 mg/dL    Bun 23 (H) 8 - 22 mg/dL    Creatinine 0.72 0.50 - 1.40 mg/dL    Calcium 9.1 8.5 - 10.5 mg/dL    Correct Calcium 9.5 8.5 - 10.5 mg/dL    AST(SGOT) 65 (H) 12 - 45 U/L    ALT(SGPT) 102 (H) 2 - 50 U/L    Alkaline Phosphatase 286 (H) 30 - 99 U/L    Total Bilirubin 0.4 0.1 - 1.5 mg/dL    Albumin 3.5 3.2 - 4.9 g/dL    Total Protein 6.3 6.0 - 8.2 g/dL    Globulin 2.8 1.9 - 3.5 g/dL    A-G Ratio 1.3 g/dL "   ESTIMATED GFR    Collection Time: 07/30/24  5:52 AM   Result Value Ref Range    GFR (CKD-EPI) 84 >60 mL/min/1.73 m 2       Medications:  Scheduled Medications   Medication Dose Frequency    acetaminophen  1,000 mg TID    Pharmacy Consult Request  1 Each PHARMACY TO DOSE    senna-docusate  2 Tablet BID    omeprazole  20 mg DAILY    enoxaparin (LOVENOX) injection  40 mg DAILY AT 1800     PRN medications: Respiratory Therapy Consult, hydrALAZINE, senna-docusate **AND** polyethylene glycol/lytes, ondansetron **OR** ondansetron, sodium chloride, oxyCODONE immediate-release **OR** oxyCODONE immediate-release    Diet:  Current Diet Order   Procedures    Diet Order Diet: Regular       Medical Decision Making and Plan:  Fracture of right (dominant side) humerus   - 3 part proximal humerus fracture   - s/p closed manipulation and treatment by Dr. Ramírez Denney MD on 7/16   - NWB RUE   - PT/OT continue     Fracture of left tibia   - Comminuted proximal tibia fracture   - S/p Open reduction internal fixation left tibial shaft with intramedullary nail by Dr. Ramírez Denney MD on 7/16   - WBAT LLE   - PT/OT while in house   - good candidate for IPR     Acute blood loss anemia   - Hgb 9.6, continues to drop   -Possible hematoma in left leg?  - Checking Ferritin, Iron studies      Orthostatic Hypotension   - Secondary to acute blood loss anemia   - IV fluids   - JACKIE hose   - Abdominal binder  -7/25- orthostatic in therapy in the am. Hold telmisartan  -7/26- DC telmisartam  -7/30- start midodrine 5mg TID     Uncontrolled neuropathic pain   - 7/24- stop gabapentin     Hypertension   - was on Telmisartan 40mg Q evening   -DC telmisartan due to low bp     Circadian Rhythm disorder:   Recommend lights on during the day/off at night, minimize nighttime interruptions as able.     Mood  - at risk of adjustment disorder, depression, and anxiety due to functional decline     ID:  - at risk for Urinary tract infection     Skin/Wounds:  -  Pressure relief q2h while in bed. Close monitoring for signs of breakdown     Pain:  - Neuroceptic - On Tylenol prn, continue oxycodone prn  - Neuropathic - DC gabapentin 100mg TID     DVT prophylaxis:  continue lovenox     GI prophylaxis:  On Prilosec 20mg daily         -Follow-up Ortho, PCP    ____________________________________    Derik Hays MD  Physical Medicine & Rehabilitation   Brain Injury Medicine   ____________________________________

## 2024-07-30 NOTE — CARE PLAN
The patient is Stable - Low risk of patient condition declining or worsening    Shift Goals  Clinical Goals: safety  Patient Goals: safety  Family Goals: n/a    Problem: Urinary Elimination  Goal: Establish and maintain regular urinary output  Outcome: Progressing Patient voiding adequate amounts of clear, yellow urine. Denies dysuria and flank pain: afebrile. Will continue to monitor.      Problem: Pain - Standard  Goal: Alleviation of pain or a reduction in pain to the patient’s comfort goal  Outcome: Progressing Pt able to participate in therapies and activities this shift.

## 2024-07-31 ENCOUNTER — APPOINTMENT (OUTPATIENT)
Dept: OCCUPATIONAL THERAPY | Facility: REHABILITATION | Age: 79
DRG: 560 | End: 2024-07-31
Attending: PHYSICAL MEDICINE & REHABILITATION
Payer: COMMERCIAL

## 2024-07-31 ENCOUNTER — APPOINTMENT (OUTPATIENT)
Dept: PHYSICAL THERAPY | Facility: REHABILITATION | Age: 79
DRG: 560 | End: 2024-07-31
Attending: PHYSICAL MEDICINE & REHABILITATION
Payer: COMMERCIAL

## 2024-07-31 VITALS
SYSTOLIC BLOOD PRESSURE: 132 MMHG | OXYGEN SATURATION: 97 % | HEIGHT: 64 IN | HEART RATE: 76 BPM | RESPIRATION RATE: 20 BRPM | BODY MASS INDEX: 28.79 KG/M2 | DIASTOLIC BLOOD PRESSURE: 68 MMHG | TEMPERATURE: 97.6 F | WEIGHT: 168.65 LBS

## 2024-07-31 PROCEDURE — 97535 SELF CARE MNGMENT TRAINING: CPT

## 2024-07-31 PROCEDURE — 770010 HCHG ROOM/CARE - REHAB SEMI PRIVAT*

## 2024-07-31 PROCEDURE — 700102 HCHG RX REV CODE 250 W/ 637 OVERRIDE(OP): Performed by: PHYSICAL MEDICINE & REHABILITATION

## 2024-07-31 PROCEDURE — 97110 THERAPEUTIC EXERCISES: CPT

## 2024-07-31 PROCEDURE — 97116 GAIT TRAINING THERAPY: CPT

## 2024-07-31 PROCEDURE — 97530 THERAPEUTIC ACTIVITIES: CPT

## 2024-07-31 PROCEDURE — A9270 NON-COVERED ITEM OR SERVICE: HCPCS | Performed by: PHYSICAL MEDICINE & REHABILITATION

## 2024-07-31 PROCEDURE — 700111 HCHG RX REV CODE 636 W/ 250 OVERRIDE (IP): Mod: JZ | Performed by: PHYSICAL MEDICINE & REHABILITATION

## 2024-07-31 RX ADMIN — ACETAMINOPHEN 1000 MG: 500 TABLET, FILM COATED ORAL at 16:37

## 2024-07-31 RX ADMIN — ACETAMINOPHEN 1000 MG: 500 TABLET, FILM COATED ORAL at 20:52

## 2024-07-31 RX ADMIN — MIDODRINE HYDROCHLORIDE 5 MG: 2.5 TABLET ORAL at 11:25

## 2024-07-31 RX ADMIN — OXYCODONE HYDROCHLORIDE 5 MG: 5 TABLET ORAL at 20:58

## 2024-07-31 RX ADMIN — MIDODRINE HYDROCHLORIDE 5 MG: 2.5 TABLET ORAL at 08:01

## 2024-07-31 RX ADMIN — OMEPRAZOLE 20 MG: 20 CAPSULE, DELAYED RELEASE ORAL at 08:01

## 2024-07-31 RX ADMIN — ENOXAPARIN SODIUM 40 MG: 100 INJECTION SUBCUTANEOUS at 18:08

## 2024-07-31 RX ADMIN — ACETAMINOPHEN 1000 MG: 500 TABLET, FILM COATED ORAL at 08:02

## 2024-07-31 RX ADMIN — MIDODRINE HYDROCHLORIDE 5 MG: 2.5 TABLET ORAL at 18:08

## 2024-07-31 ASSESSMENT — ACTIVITIES OF DAILY LIVING (ADL)
BED_CHAIR_WHEELCHAIR_TRANSFER_DESCRIPTION: INCREASED TIME;VERBAL CUEING;SUPERVISION FOR SAFETY
TOILET_TRANSFER_DESCRIPTION: GRAB BAR;SUPERVISION FOR SAFETY;VERBAL CUEING

## 2024-07-31 ASSESSMENT — PAIN DESCRIPTION - PAIN TYPE: TYPE: ACUTE PAIN

## 2024-07-31 ASSESSMENT — GAIT ASSESSMENTS
DISTANCE (FEET): 3
ASSISTIVE DEVICE: PARALLEL BARS
GAIT LEVEL OF ASSIST: CONTACT GUARD ASSIST
DEVIATION: ANTALGIC;STEP TO;BRADYKINETIC;DECREASED TOE OFF

## 2024-07-31 NOTE — THERAPY
Physical Therapy   Daily Treatment     Patient Name: Gali Broderick  Age:  79 y.o., Sex:  female  Medical Record #: [id number]  Today's Date: 7/31/2024     Precautions  Precautions: Weight Bearing As Tolerated Left Lower Extremity, Non Weight Bearing Right Upper Extremity  Comments: Orthostatic hypotension    Subjective    Patient was found in bed with  present in room and was agreeable to therapy. Patient and  mentioned that patient was having some non-painful twitching of the R shoulder. Patient reported some lightheadedness earlier in the day with OT and upon initially sitting/standing but reported it was tolerable. Patient is still having significant pain in L leg reporting 7-8/10 pain during treatment today. Patient mentioned she has been avoiding pain meds aside from tylenol out of fear of becoming addicted but is considering asking for them again to make therapy more tolerable and hopefully increase what she can do.      Objective       07/31/24 1301   PT Charge Group   PT Gait Training (Units) 1   PT Therapeutic Exercise (Units) 2   PT Therapeutic Activities (Units) 1   PT Total Time Spent   PT Individual Total Time Spent (Mins) 60   Gait Functional Level of Assist    Gait Level Of Assist Contact Guard Assist   Assistive Device Parallel Bars   Distance (Feet) 3  (4 steps once, then two step before rest)   # of Times Distance was Traveled 2   Deviation Antalgic;Step To;Bradykinetic;Decreased Toe Off   Transfer Functional Level of Assist   Bed, Chair, Wheelchair Transfer Contact Guard Assist   Bed Chair Wheelchair Transfer Description Increased time;Verbal cueing;Supervision for safety   Toilet Transfers Contact Guard Assist   Toilet Transfer Description Grab bar;Supervision for safety;Verbal cueing   Supine Lower Body Exercise   Bridges Two Legged;1 set of 10   Hip Abduction 2 sets of 10;Left   Straight Leg Raises 2 sets of 10;Bilateral  (2 lbs with R leg, min assist L leg)   Short Arc Quad 2  sets of 10;Bilateral  (2 lb weight R leg)   Heel Slide 2 sets of 10;Left  (min assist to full ROM)   Ankle Pumps 2 sets of 10;Bilateral   Quadriceps Isometrics 1 set of 10  (5 s hold)   Sitting Lower Body Exercises   Hip Adduction 2 sets of 10  (ball squeeze)   Marching 1 set of 10;Reciprocal   Standing Lower Body Exercises   Hamstring Curl 1 set of 10;Left   Hip Flexion 1 set of 10;Left   Hip Abduction 1 set of 10;Left   Bed Mobility    Sit to Supine Supervised   Sit to Stand Contact Guard Assist   Scooting Supervised   Rolling Supervised   Interdisciplinary Plan of Care Collaboration   IDT Collaboration with  Family / Caregiver;Physician   Patient Position at End of Therapy In Bed;Call Light within Reach;Family / Friend in Room   Collaboration Comments   ( present for session, spoke to MD about BP meds and small spasms in R arm)         Assessment    Patient is slowly progressing with gait but was able to take 4 steps before needing to sit, which is increased from 3 the day before. Patient's gait is very limited by the pain in her L leg. Patient also continues to fatigue quickly with standing exercises although patient reported standing for a longer period earlier in the day with OT. Although patient continues to have orthostatic hypotension the patient's symptoms are tolerable and did not affect therapy. Patient would continue to benefit from LE strengthening, gait training and endurance training.     Strengths: Able to follow instructions, Alert and oriented, Effective communication skills, Independent prior level of function, Motivated for self care and independence, Pleasant and cooperative, Willingly participates in therapeutic activities, Supportive family  Barriers: Decreased endurance, Fatigue, Generalized weakness, Impaired activity tolerance, Impaired balance, Limited mobility, Pain, Impulsive    Plan    Progressing to gait with kari walker  Strengthening in all planes  Balance  Transfer  safety  Car transfers and stairs as able    DME  PT DME Recommendations  Assistive Device: Jelani Walker    Passport items to be completed:  Get in/out of bed safely, in/out of a vehicle, safely use mobility device, walk or wheel around home/community, navigate up and down stairs, show how to get up/down from the ground, ensure home is accessible, demonstrate HEP, complete caregiver training    Physical Therapy Problems (Active)       Problem: Balance       Dates: Start:  07/24/24         Goal: STG-Within one week, patient will maintain dynamic standing during AP and lateral weight shifts on firm surface for one min with contact guard assist.        Dates: Start:  07/24/24            Goal: STG-Within one week, patient will tolerate caputo balance test.       Dates: Start:  07/24/24               Problem: Mobility       Dates: Start:  07/24/24         Goal: STG-Within one week, patient will ambulate 10 ft using hemiwalker with contact guard assist.        Dates: Start:  07/24/24            Goal: STG-Within one week, patient will ambulate up/down a curb using hemiwalker and contact guard assist.        Dates: Start:  07/24/24               Problem: Mobility Transfers       Dates: Start:  07/24/24         Goal: STG-Within one week, patient will transfer bed to chair with stand pivot transfer safely with supervision assist.        Dates: Start:  07/24/24               Problem: PT-Long Term Goals       Dates: Start:  07/24/24         Goal: LTG-By discharge, patient will receive >35/56 on caputo balance test.        Dates: Start:  07/24/24            Goal: LTG-By discharge, patient will ambulate 100 ft using hemiwalker or LRAD with supervision assist.        Dates: Start:  07/24/24            Goal: LTG-By discharge, patient will perform home exercise program using handouts independently.        Dates: Start:  07/24/24            Goal: LTG-By discharge, patient will transfer in/out of a car using hemiwalker with supervision  assist.        Dates: Start:  07/24/24

## 2024-07-31 NOTE — DISCHARGE PLANNING
CM//Discharge :      DME:   [facility]                    Following equipment has been ordered: Jelani walker  Status: Sent

## 2024-07-31 NOTE — PROGRESS NOTES
"  Physical Medicine & Rehabilitation Progress Note    Encounter Date: 7/31/2024    Chief Complaint: Weakness    Interval Events (Subjective):  Seen in bed. Tolerating therapy. BP stable on midodrine    Objective:  VITAL SIGNS: /49   Pulse 64   Temp 36.1 °C (96.9 °F) (Temporal)   Resp 18   Ht 1.626 m (5' 4\")   Wt 76.5 kg (168 lb 10.4 oz)   SpO2 95%   BMI 28.95 kg/m²   Gen: No acute distress, well developed well nourished adult  HEENT: Normal Cephalic Atraumatic, Normal conjunctiva.   CV: warm extremities, well perfused, no edema  Resp: symmetric chest rise, breathing comfortably on room air  Abd: Soft, Non distended  Extremities: normal bulk, no atrophy  Skin: no visible rashes or lesions.   Neuro: alert, awake  Psych: Mood and affect appropriate and congruent    Laboratory Values:  Recent Results (from the past 72 hour(s))   CBC WITHOUT DIFFERENTIAL    Collection Time: 07/30/24  5:52 AM   Result Value Ref Range    WBC 5.8 4.8 - 10.8 K/uL    RBC 3.28 (L) 4.20 - 5.40 M/uL    Hemoglobin 10.1 (L) 12.0 - 16.0 g/dL    Hematocrit 30.8 (L) 37.0 - 47.0 %    MCV 93.9 81.4 - 97.8 fL    MCH 30.8 27.0 - 33.0 pg    MCHC 32.8 32.2 - 35.5 g/dL    RDW 46.2 35.9 - 50.0 fL    Platelet Count 220 164 - 446 K/uL    MPV 10.5 9.0 - 12.9 fL   Comp Metabolic Panel    Collection Time: 07/30/24  5:52 AM   Result Value Ref Range    Sodium 141 135 - 145 mmol/L    Potassium 3.9 3.6 - 5.5 mmol/L    Chloride 106 96 - 112 mmol/L    Co2 23 20 - 33 mmol/L    Anion Gap 12.0 7.0 - 16.0    Glucose 107 (H) 65 - 99 mg/dL    Bun 23 (H) 8 - 22 mg/dL    Creatinine 0.72 0.50 - 1.40 mg/dL    Calcium 9.1 8.5 - 10.5 mg/dL    Correct Calcium 9.5 8.5 - 10.5 mg/dL    AST(SGOT) 65 (H) 12 - 45 U/L    ALT(SGPT) 102 (H) 2 - 50 U/L    Alkaline Phosphatase 286 (H) 30 - 99 U/L    Total Bilirubin 0.4 0.1 - 1.5 mg/dL    Albumin 3.5 3.2 - 4.9 g/dL    Total Protein 6.3 6.0 - 8.2 g/dL    Globulin 2.8 1.9 - 3.5 g/dL    A-G Ratio 1.3 g/dL   ESTIMATED GFR    " Collection Time: 07/30/24  5:52 AM   Result Value Ref Range    GFR (CKD-EPI) 84 >60 mL/min/1.73 m 2       Medications:  Scheduled Medications   Medication Dose Frequency    midodrine  5 mg TID WITH MEALS    acetaminophen  1,000 mg TID    Pharmacy Consult Request  1 Each PHARMACY TO DOSE    senna-docusate  2 Tablet BID    omeprazole  20 mg DAILY    enoxaparin (LOVENOX) injection  40 mg DAILY AT 1800     PRN medications: Respiratory Therapy Consult, hydrALAZINE, senna-docusate **AND** polyethylene glycol/lytes, ondansetron **OR** ondansetron, sodium chloride, oxyCODONE immediate-release **OR** oxyCODONE immediate-release    Diet:  Current Diet Order   Procedures    Diet Order Diet: Regular       Medical Decision Making and Plan:  Fracture of right (dominant side) humerus   - 3 part proximal humerus fracture   - s/p closed manipulation and treatment by Dr. Ramírez Denney MD on 7/16   - NWB RUE   - PT/OT continue     Fracture of left tibia   - Comminuted proximal tibia fracture   - S/p Open reduction internal fixation left tibial shaft with intramedullary nail by Dr. Ramírez Denney MD on 7/16   - WBAT LLE   - PT/OT while in house   - good candidate for IPR     Acute blood loss anemia   - Hgb 9.6, continues to drop   -Possible hematoma in left leg?  - Checking Ferritin, Iron studies      Orthostatic Hypotension   - Secondary to acute blood loss anemia   - IV fluids   - JACKIE hose   - Abdominal binder  -7/25- orthostatic in therapy in the am. Hold telmisartan  -7/26- DC telmisartam  -7/30- start midodrine 5mg TID     Uncontrolled neuropathic pain   - 7/24- stop gabapentin     Hypertension   - was on Telmisartan 40mg Q evening   -DC telmisartan due to low bp     Circadian Rhythm disorder:   Recommend lights on during the day/off at night, minimize nighttime interruptions as able.     Mood  - at risk of adjustment disorder, depression, and anxiety due to functional decline     ID:  - at risk for Urinary tract infection      Skin/Wounds:  - Pressure relief q2h while in bed. Close monitoring for signs of breakdown     Pain:  - Neuroceptic - On Tylenol prn, continue oxycodone prn  - Neuropathic - DC gabapentin 100mg TID     DVT prophylaxis:  continue lovenox     GI prophylaxis:  On Prilosec 20mg daily         -Follow-up Ortho, PCP    ____________________________________    Derik Hays MD  Physical Medicine & Rehabilitation   Brain Injury Medicine   ____________________________________

## 2024-07-31 NOTE — PROGRESS NOTES
NURSING DAILY NOTE    Name: Gali Broderick   Date of Admission: 7/23/2024   Admitting Diagnosis: Multiple trauma  Attending Physician: Derik Hays M.d.  Allergies: Iodine    Safety  Patient Assist  Min assist.  Patient Precautions  Weight Bearing As Tolerated Left Lower Extremity, Non Weight Bearing Right Upper Extremity  Precaution Comments  Orthostatic hypotension  Bed Transfer Status  Standby Assist  Toilet Transfer Status   Contact Guard Assist  Assistive Devices  Wheelchair, Rails  Oxygen  None - Room Air  Diet/Therapeutic Dining  Current Diet Order   Procedures    Diet Order Diet: Regular     Pill Administration  whole  Agitated Behavioral Scale     ABS Level of Severity       Fall Risk  Has the patient had a fall this admission?   No  Fifi Evangelista Fall Risk Scoring  15, HIGH RISK  Fall Risk Safety Measures  bed alarm and chair alarm    Vitals  Temperature: 36.1 °C (96.9 °F)  Temp src: Temporal  Pulse: 64  Respiration: 18  Blood Pressure : 125/49  Blood Pressure MAP (Calculated): 74 MM HG  BP Location: Left, Upper Arm  Patient BP Position: Supine     Oxygen  Pulse Oximetry: 95 %  O2 (LPM): 0  O2 Delivery Device: None - Room Air    Bowel and Bladder  Last Bowel Movement  07/30/24  Stool Type  Type 4: Like a sausage or snake, smooth and soft  Bowel Device  Bathroom  Continent  Bladder: Did not void   Bowel: No movement  Bladder Function  Urine Void (mL):  (moderate)  Number of Times Voided: 1  Urine Color: Yellow  Genitourinary Assessment   Bladder Assessment (WDL):  Within Defined Limits  Ariza Catheter: Not Applicable  Urine Color: Yellow  Bladder Device: Bathroom  Bladder Scan: Post Void  $ Bladder Scan Results (mL): 6    Skin  Sascha Score   17  Sensory Interventions   Skin Preventative Measures: Pillows in Use for Support / Positioning  Moisture Interventions  Moisturizers/Barriers: Barrier Wipes      Pain  Pain Rating Scale  2 - Notice Pain,  does not interfere with activities  Pain Location  Arm  Pain Location Orientation  Right  Pain Interventions   Declines    ADLs    Bathing    (shower with ot)  Linen Change      Personal Hygiene  Moist Estefany Wipes, Perineal Care  Chlorhexidine Bath      Oral Care     Teeth/Dentures     Shave     Nutrition Percentage Eaten  Lunch, Between 50-75% Consumed  Environmental Precautions  Treaded Slipper Socks on Patient, Personal Belongings, Wastebasket, Call Bell etc. in Easy Reach, Transferred to Stronger Side, Bed in Low Position  Patient Turns/Positioning  Patient Turns Self from Side to Side  Patient Turns Assistance/Tolerance     Bed Positions  Bed Controls On, Bed Locked  Head of Bed Elevated  Self regulated      Psychosocial/Neurologic Assessment  Psychosocial Assessment  Psychosocial (WDL):  Within Defined Limits  Neurologic Assessment  Neuro (WDL): Within Defined Limits  Level of Consciousness: Alert  Orientation Level: Oriented X4  Cognition: Appropriate judgement, Follows commands  Speech: Clear  Motor Function/Sensation Assessment: Sensation  RUE Sensation: Tingling, Pain  LLE Sensation: Tingling, Pain, Other (Comment)  EENT (WDL):  Within Defined Limits    Cardio/Pulmonary Assessment  Edema   RUE Edema: Generalized  LLE Edema: Generalized  Respiratory Breath Sounds  RUL Breath Sounds: Clear  RML Breath Sounds: Clear  RLL Breath Sounds: Clear  BETRA Breath Sounds: Clear  LLL Breath Sounds: Clear  Cardiac Assessment   Cardiac (WDL):  WDL Except (aortic insuff.)

## 2024-07-31 NOTE — THERAPY
"Occupational Therapy  Daily Treatment     Patient Name: Gali Broderick  Age:  79 y.o., Sex:  female  Medical Record #: [id number]  Today's Date: 7/31/2024     Precautions  Precautions: Weight Bearing As Tolerated Left Lower Extremity, Non Weight Bearing Right Upper Extremity  Comments: Orthostatic hypotension         Subjective    \"Before I leave, can we work on putting on a t shirt? Because that's what I normally wear at home.\"     Objective       07/31/24 0831 07/31/24 1031   Therapy Missed   Missed Therapy (Minutes)  --  15   Reason For Missed Therapy  --  Medical - Patient on Hold from Therapy  (lightheadedness)   OT Charge Group   OT Self Care / ADL (Units) 3  --    OT Therapy Activity (Units) 1 2   OT Therapeutic Exercise (Units)  --  1   OT Total Time Spent   OT Individual Total Time Spent (Mins) 60 45   Functional Level of Assist   Grooming Modified Independent  --    Grooming Description Seated in wheelchair at sink;Set-up of equipment  --    Upper Body Dressing Minimal Assist  --    Upper Body Dressing Description Assit with threading arms through sleeves;Increased time  --    Lower Body Dressing Moderate Assist  --    Lower Body Dressing Description   (assist for compression socks, assist for donning shoes. Able to don pants with setup assist)  --    Bed, Chair, Wheelchair Transfer Standby Assist  --    Sitting Lower Body Exercises   Nustep  --  Resistance Level 5  (15 mins)   Balance   Comments  --  Standing in // bars. Pt completed clothespin tree in standing with CGA to maintain standing without UE support. Able to put some weight through the left leg.   Interdisciplinary Plan of Care Collaboration   Patient Position at End of Therapy  --  In Bed;Family / Friend in Room       During 1031 session, pt became lightheaded while standing. BP values were as follows:       07/31/24 1110 07/31/24 1115   Vitals   Patient BP Position Sitting  (after standing and feeling lightheaded) Sitting  (pt could not " tolerate standing long enough due to dizziness. BP reading after immediately sitting down after standing for a few mins)   Blood Pressure  (!) 144/50 124/50   Vitals Comments Pt wearing compression stockings  --      Pt continued to feel lightheaded when sitting and requested to lay down in bed.     Assessment    Pt seen for OT tx sessions twice this morning. She was able to tolerate morning ADLs well and tolerated therapeutic exercise on the NuStep. She was unable to tolerate standing due to feeling lightheaded (BP values are above).   Strengths: Able to follow instructions, Alert and oriented, Effective communication skills, Independent prior level of function, Manages pain appropriately, Motivated for self care and independence, Pleasant and cooperative, Supportive family, Willingly participates in therapeutic activities  Barriers: Decreased endurance, Generalized weakness, Impaired balance, Limited mobility    Plan    Standing tolerance/balance  Transfer to walk in shower with small threshold-if unable to do, pt may benefit from tub transfer bench vs shower (discuss options)  UB dressing technique with t-shirt    DME  OT DME Recommendations  Bathroom Equipment:  (shower chair)    Passport items to be completed:  Perform bathroom transfers, complete dressing, complete feeding, get ready for the day, prepare a simple meal, participate in household tasks, adapt home for safety needs, demonstrate home exercise program, complete caregiver training     Occupational Therapy Goals (Active)       Problem: Bathing       Dates: Start:  07/25/24         Goal: STG-Within one week, patient will bathe with min assist using LRAD as needed       Dates: Start:  07/25/24               Problem: Dressing       Dates: Start:  07/25/24         Goal: STG-Within one week, patient will dress UB with min assist using LRAD as needed       Dates: Start:  07/25/24            Goal: STG-Within one week, patient will dress LB with min assist  using LRAD as needed       Dates: Start:  07/25/24               Problem: Functional Transfers       Dates: Start:  07/25/24         Goal: STG-Within one week, patient will transfer to toilet at SPV level using LRAD as needed       Dates: Start:  07/25/24               Problem: OT Long Term Goals       Dates: Start:  07/25/24         Goal: LTG-By discharge, patient will complete basic self care tasks at mod I level using LRAD as needed       Dates: Start:  07/25/24            Goal: LTG-By discharge, patient will perform bathroom transfers at mod I level using LRAD as needed       Dates: Start:  07/25/24

## 2024-08-01 ENCOUNTER — APPOINTMENT (OUTPATIENT)
Dept: PHYSICAL THERAPY | Facility: REHABILITATION | Age: 79
DRG: 560 | End: 2024-08-01
Attending: PHYSICAL MEDICINE & REHABILITATION
Payer: COMMERCIAL

## 2024-08-01 ENCOUNTER — APPOINTMENT (OUTPATIENT)
Dept: OCCUPATIONAL THERAPY | Facility: REHABILITATION | Age: 79
DRG: 560 | End: 2024-08-01
Attending: PHYSICAL MEDICINE & REHABILITATION
Payer: COMMERCIAL

## 2024-08-01 PROBLEM — R42 ORTHOSTATIC DIZZINESS: Status: ACTIVE | Noted: 2024-08-01

## 2024-08-01 PROCEDURE — 97535 SELF CARE MNGMENT TRAINING: CPT

## 2024-08-01 PROCEDURE — 700111 HCHG RX REV CODE 636 W/ 250 OVERRIDE (IP): Mod: JZ | Performed by: PHYSICAL MEDICINE & REHABILITATION

## 2024-08-01 PROCEDURE — 97110 THERAPEUTIC EXERCISES: CPT

## 2024-08-01 PROCEDURE — 97530 THERAPEUTIC ACTIVITIES: CPT

## 2024-08-01 PROCEDURE — 700102 HCHG RX REV CODE 250 W/ 637 OVERRIDE(OP): Performed by: PHYSICAL MEDICINE & REHABILITATION

## 2024-08-01 PROCEDURE — 97116 GAIT TRAINING THERAPY: CPT

## 2024-08-01 PROCEDURE — 770010 HCHG ROOM/CARE - REHAB SEMI PRIVAT*

## 2024-08-01 PROCEDURE — A9270 NON-COVERED ITEM OR SERVICE: HCPCS | Performed by: PHYSICAL MEDICINE & REHABILITATION

## 2024-08-01 RX ORDER — MIDODRINE HYDROCHLORIDE 5 MG/1
5 TABLET ORAL 3 TIMES DAILY PRN
Qty: 60 TABLET | Refills: 1 | Status: SHIPPED | OUTPATIENT
Start: 2024-08-01 | End: 2024-08-06

## 2024-08-01 RX ORDER — ACETAMINOPHEN 500 MG
1000 TABLET ORAL 3 TIMES DAILY
Qty: 30 TABLET | Refills: 0
Start: 2024-08-01

## 2024-08-01 RX ORDER — OXYCODONE HYDROCHLORIDE 5 MG/1
5 TABLET ORAL EVERY 6 HOURS PRN
Qty: 28 TABLET | Refills: 0 | Status: SHIPPED | OUTPATIENT
Start: 2024-08-01 | End: 2024-08-06

## 2024-08-01 RX ORDER — OXYCODONE HYDROCHLORIDE 5 MG/1
5 TABLET ORAL 2 TIMES DAILY
Status: DISCONTINUED | OUTPATIENT
Start: 2024-08-01 | End: 2024-08-07 | Stop reason: HOSPADM

## 2024-08-01 RX ORDER — ASPIRIN 81 MG/1
81 TABLET ORAL 2 TIMES DAILY
Qty: 60 TABLET | Refills: 0 | Status: SHIPPED | OUTPATIENT
Start: 2024-08-01

## 2024-08-01 RX ADMIN — ENOXAPARIN SODIUM 40 MG: 100 INJECTION SUBCUTANEOUS at 17:32

## 2024-08-01 RX ADMIN — OMEPRAZOLE 20 MG: 20 CAPSULE, DELAYED RELEASE ORAL at 08:12

## 2024-08-01 RX ADMIN — ACETAMINOPHEN 1000 MG: 500 TABLET, FILM COATED ORAL at 08:11

## 2024-08-01 RX ADMIN — MIDODRINE HYDROCHLORIDE 5 MG: 2.5 TABLET ORAL at 12:11

## 2024-08-01 RX ADMIN — ACETAMINOPHEN 1000 MG: 500 TABLET, FILM COATED ORAL at 20:08

## 2024-08-01 RX ADMIN — OXYCODONE HYDROCHLORIDE 5 MG: 5 TABLET ORAL at 13:24

## 2024-08-01 RX ADMIN — ACETAMINOPHEN 1000 MG: 500 TABLET, FILM COATED ORAL at 14:33

## 2024-08-01 RX ADMIN — MIDODRINE HYDROCHLORIDE 5 MG: 2.5 TABLET ORAL at 08:12

## 2024-08-01 RX ADMIN — OXYCODONE HYDROCHLORIDE 5 MG: 5 TABLET ORAL at 08:13

## 2024-08-01 RX ADMIN — OXYCODONE HYDROCHLORIDE 5 MG: 5 TABLET ORAL at 16:29

## 2024-08-01 RX ADMIN — MIDODRINE HYDROCHLORIDE 5 MG: 2.5 TABLET ORAL at 17:32

## 2024-08-01 ASSESSMENT — GAIT ASSESSMENTS
DEVIATION: ANTALGIC;STEP TO;BRADYKINETIC;DECREASED TOE OFF
ASSISTIVE DEVICE: PARALLEL BARS;FRONT WHEEL WALKER
DISTANCE (FEET): 5
DISTANCE (FEET): 3
ASSISTIVE DEVICE: PARALLEL BARS
GAIT LEVEL OF ASSIST: CONTACT GUARD ASSIST
GAIT LEVEL OF ASSIST: CONTACT GUARD ASSIST

## 2024-08-01 ASSESSMENT — ACTIVITIES OF DAILY LIVING (ADL)
TOILET_TRANSFER_DESCRIPTION: SUPERVISION FOR SAFETY;VERBAL CUEING;GRAB BAR
BED_CHAIR_WHEELCHAIR_TRANSFER_DESCRIPTION: INCREASED TIME;ADAPTIVE EQUIPMENT;SUPERVISION FOR SAFETY
BED_CHAIR_WHEELCHAIR_TRANSFER_DESCRIPTION: SET-UP OF EQUIPMENT;SUPERVISION FOR SAFETY

## 2024-08-01 ASSESSMENT — PAIN DESCRIPTION - PAIN TYPE: TYPE: ACUTE PAIN

## 2024-08-01 NOTE — THERAPY
"Physical Therapy   Daily Treatment     Patient Name: Gali Broderick  Age:  79 y.o., Sex:  female  Medical Record #: [id number]  Today's Date: 8/1/2024     Precautions  Precautions: Weight Bearing As Tolerated Left Lower Extremity, Non Weight Bearing Right Upper Extremity  Comments: Orthostatic hypotension    Subjective    Patient was found seated in wheelchair for both sessions and was agreeable to therapy. In morning patient reported having taken pain meds and felt they helped her do more in that session. She had more pain in the afternoon session. She states \"if I just didn't have pain in my L knee I think I could do anything.\"     Objective       08/01/24 0831 08/01/24 1515   PT Charge Group   PT Gait Training (Units) 2 1   PT Therapeutic Exercise (Units) 1 2   PT Total Time Spent   PT Individual Total Time Spent (Mins) 45 45   Pain 0 - 10 Group   Location  --  Knee   Location Orientation  --  Left   Pain Rating Scale (NPRS)  --  10  (While steping onto 2 in block, slowly decreased from 7 to 5 while doing nustep)   Description  --  Aching   Comfort Goal  --  Perform Activity   Gait Functional Level of Assist    Gait Level Of Assist Contact Guard Assist Contact Guard Assist   Assistive Device Parallel Bars;Front Wheel Walker Parallel Bars   Distance (Feet) 5  (5 ft twice in parallel bars then about 3 ft 2x with hemiwalker outside of bars (5 short steps at a time before rest needed)) 3  (with 2 in step to walk over)   # of Times Distance was Traveled 2 1   Deviation  --  Antalgic;Step To;Bradykinetic;Decreased Toe Off   Stairs Functional Level of Assist   Level of Assist with Stairs  --  Contact Guard Assist   # of Stairs Climbed  --  1  (2in step)   Stairs Description  --  Verbal cueing  (parallel bars)   Transfer Functional Level of Assist   Bed, Chair, Wheelchair Transfer Contact Guard Assist Contact Guard Assist   Bed Chair Wheelchair Transfer Description Increased time;Adaptive equipment;Supervision for safety " Set-up of equipment;Supervision for safety   Toilet Transfers  --  Contact Guard Assist   Toilet Transfer Description  --  Supervision for safety;Verbal cueing;Grab bar   Supine Lower Body Exercise   Bridges  --  Two Legged;1 set of 10   Hip Abduction  --  1 set of 10;Bilateral  (also did clam shells with mod resistance band 1x10)   Hip Adduction   --  1 set of 10  (ball squeeze)   Straight Leg Raises  --  1 set of 10;Bilateral  (2 1/2 lb weight for R leg)   Short Arc Quad  --  1 set of 10;Bilateral  (2 1/2 lb weight for R leg)   Heel Slide  --  1 set of 10;Bilateral  (min assist for L)   Ankle Pumps  --  1 set of 10;Bilateral   Sitting Lower Body Exercises   Hip Abduction 1 set of 10;Medium Resistance Theraband  --    Marching Reciprocal;1 set of 10  --    Hamstring Curl 2 sets of 10;Bilateral;Medium Resistance Theraband  --    Nustep Resistance Level 3  (10 min, 400 steps) Resistance Level 3  (10 min,410 steps)   Bed Mobility    Sit to Supine  --  Supervised   Sit to Stand Contact Guard Assist  --    Interdisciplinary Plan of Care Collaboration   IDT Collaboration with   --  Family / Caregiver;Nursing   Patient Position at End of Therapy Seated;Edge of Bed;Call Light within Reach;Tray Table within Reach;Phone within Reach In Bed;Call Light within Reach;Family / Friend in Room;Phone within Reach   Collaboration Comments  --   present for session, double checked with nursing if any pain meds were due         Assessment    Patient is very limited by the pain in her L knee. She did show some progress with gait and was able to walk with hemiwalker today but was only able to take a few small steps before the pain was too much and she had to sit down. She struggled ambulating over a 2 in step, requiring lots of cueing and time; she struggled to put weight into her L leg long enough to lift her R leg onto the step. Patient will likely benefit from L UE strengthening, particularly triceps, in order to put more  weight through her arm and offload L leg. Her endurance is increasing and she was able to go for 10 minutes without rest on the nustep (previously only able to go for about 2 before needing rest) and her L knee ROM was much better. She should continue to work on gait including curbs and car transfers in order to be able to return home safely.     Strengths: Able to follow instructions, Alert and oriented, Effective communication skills, Independent prior level of function, Motivated for self care and independence, Pleasant and cooperative, Willingly participates in therapeutic activities, Supportive family  Barriers: Decreased endurance, Fatigue, Generalized weakness, Impaired activity tolerance, Impaired balance, Limited mobility, Pain, Impulsive    Plan    L UE strengthening (triceps)  Progressing to gait with jelani walker  Strengthening in all planes  Balance  Transfer safety  Car transfers and stairs as able    DME  PT DME Recommendations  Assistive Device: Jelani Walker    Passport items to be completed:  Get in/out of bed safely, in/out of a vehicle, safely use mobility device, walk or wheel around home/community, navigate up and down stairs, show how to get up/down from the ground, ensure home is accessible, demonstrate HEP, complete caregiver training    Physical Therapy Problems (Active)       Problem: Balance       Dates: Start:  07/24/24         Goal: STG-Within one week, patient will tolerate caputo balance test.       Dates: Start:  07/24/24               Problem: Mobility       Dates: Start:  07/24/24         Goal: STG-Within one week, patient will ambulate 10 ft using hemiwalker with contact guard assist.        Dates: Start:  07/24/24            Goal: STG-Within one week, patient will ambulate up/down a curb using hemiwalker and contact guard assist.        Dates: Start:  07/24/24               Problem: Mobility Transfers       Dates: Start:  07/24/24         Goal: STG-Within one week, patient will  transfer bed to chair with stand pivot transfer safely with supervision assist.        Dates: Start:  07/24/24               Problem: PT-Long Term Goals       Dates: Start:  07/24/24         Goal: LTG-By discharge, patient will receive >35/56 on caputo balance test.        Dates: Start:  07/24/24            Goal: LTG-By discharge, patient will ambulate 100 ft using hemiwalker or LRAD with supervision assist.        Dates: Start:  07/24/24            Goal: LTG-By discharge, patient will perform home exercise program using handouts independently.        Dates: Start:  07/24/24            Goal: LTG-By discharge, patient will transfer in/out of a car using hemiwalker with supervision assist.        Dates: Start:  07/24/24

## 2024-08-01 NOTE — CARE PLAN
Problem: Balance  Goal: STG-Within one week, patient will tolerate caputo balance test.  Outcome: Not Met     Problem: Mobility  Goal: STG-Within one week, patient will ambulate up/down a curb using hemiwalker and contact guard assist.   Outcome: Not Met     Problem: PT-Long Term Goals  Goal: LTG-By discharge, patient will receive >35/56 on caputo balance test.   Outcome: Not Met  Goal: LTG-By discharge, patient will ambulate 100 ft using hemiwalker or LRAD with supervision assist.   Outcome: Not Met  Goal: LTG-By discharge, patient will perform home exercise program using handouts independently.   Outcome: Not Met  Goal: LTG-By discharge, patient will transfer in/out of a car using hemiwalker with supervision assist.   Outcome: Not Met     Problem: Mobility  Goal: STG-Within one week, patient will ambulate 10 ft using hemiwalker with contact guard assist.   Outcome: Progressing     Problem: Mobility Transfers  Goal: STG-Within one week, patient will transfer bed to chair with stand pivot transfer safely with supervision assist.   Outcome: Progressing     Problem: Balance  Goal: STG-Within one week, patient will maintain dynamic standing during AP and lateral weight shifts on firm surface for one min with contact guard assist.   Outcome: Met

## 2024-08-01 NOTE — PROGRESS NOTES
NURSING DAILY NOTE    Name: Gali Broderick   Date of Admission: 7/23/2024   Admitting Diagnosis: Multiple trauma  Attending Physician: Derik Hays M.d.  Allergies: Iodine    Safety  Patient Assist  Min assist.  Patient Precautions  Weight Bearing As Tolerated Left Lower Extremity, Non Weight Bearing Right Upper Extremity  Precaution Comments  Orthostatic hypotension  Bed Transfer Status  Contact Guard Assist  Toilet Transfer Status   Contact Guard Assist  Assistive Devices  Rails, Cane - quad, Wheelchair  Oxygen  None - Room Air  Diet/Therapeutic Dining  Current Diet Order   Procedures    Diet Order Diet: Regular     Pill Administration  whole  Agitated Behavioral Scale     ABS Level of Severity       Fall Risk  Has the patient had a fall this admission?   No  Fifi Evangelista Fall Risk Scoring  15, HIGH RISK  Fall Risk Safety Measures  bed alarm, chair alarm, and poor balance    Vitals  Temperature: 36.4 °C (97.6 °F)  Temp src: Oral  Pulse: 76  Respiration: 20  Blood Pressure : 132/68 (manual)  Blood Pressure MAP (Calculated): 89 MM HG  BP Location: Left, Upper Arm  Patient BP Position: Standing 3 minutes     Oxygen  Pulse Oximetry: 97 %  O2 (LPM): 0  O2 Delivery Device: None - Room Air    Bowel and Bladder  Last Bowel Movement  07/31/24  Stool Type  Type 4: Like a sausage or snake, smooth and soft  Bowel Device  Bathroom  Continent  Bladder: Did not void   Bowel: No movement  Bladder Function  Urine Void (mL):  (mod)  Number of Times Voided: 1  Urine Color: Yellow  Genitourinary Assessment   Bladder Assessment (WDL):  Within Defined Limits  Ariza Catheter: Not Applicable  Urine Color: Yellow  Bladder Device: Bathroom  Bladder Scan: Post Void  $ Bladder Scan Results (mL): 6    Skin  Sascha Score   17  Sensory Interventions   Skin Preventative Measures: Pillows in Use for Support / Positioning, Waffle Overlay  Moisture Interventions  Moisturizers/Barriers:  Barrier Paste, Barrier Wipes      Pain  Pain Rating Scale  7 - Focus of attention, prevents doing daily activities  Pain Location  Ankle  Pain Location Orientation  Right  Pain Interventions   Medication (see MAR) (wants pain meds with NOC med pass)    ADLs    Bathing    (shower with ot)  Linen Change      Personal Hygiene  Moist Estefany Wipes, Perineal Care  Chlorhexidine Bath      Oral Care     Teeth/Dentures     Shave     Nutrition Percentage Eaten  Between 50-75% Consumed  Environmental Precautions  Treaded Slipper Socks on Patient, Bed in Low Position  Patient Turns/Positioning  Patient Turns Self from Side to Side  Patient Turns Assistance/Tolerance     Bed Positions  Bed Controls On, Bed Locked  Head of Bed Elevated  Self regulated      Psychosocial/Neurologic Assessment  Psychosocial Assessment  Psychosocial (WDL):  Within Defined Limits  Neurologic Assessment  Neuro (WDL): Within Defined Limits  Level of Consciousness: Alert  Orientation Level: Oriented X4  Cognition: Appropriate judgement, Follows commands  Speech: Clear  Motor Function/Sensation Assessment: Sensation  RUE Sensation: Tingling, Pain  LLE Sensation: Pain, Tingling  EENT (WDL):  Within Defined Limits    Cardio/Pulmonary Assessment  Edema   RUE Edema: Generalized  LLE Edema: Generalized  Respiratory Breath Sounds  RUL Breath Sounds: Clear  RML Breath Sounds: Clear  RLL Breath Sounds: Clear  BERTA Breath Sounds: Clear  LLL Breath Sounds: Clear  Cardiac Assessment   Cardiac (WDL):  WDL Except (aortic insuff.)

## 2024-08-01 NOTE — PROGRESS NOTES
"  Physical Medicine & Rehabilitation Progress Note  _____________________________________  Interdisciplinary Team Conference   Most recent IDT on 8/1/2024    IDerik M.D., was present and led the interdisciplinary team conference on 8/1/2024.  I led the IDT conference and agree with the IDT conference documentation and plan of care as noted below.     Nursing:  Diet Current Diet Order   Procedures    Diet Order Diet: Regular       Eating ADL        % of Last Meal  Oral Nutrition: Between 50-75% Consumed   Sleep    Bowel Last BM: 07/31/24   Bladder    Barriers to Discharge Home: Pain, weakness, orthostatics      Physical Therapy:  Bed Mobility    Transfers Contact Guard Assist  Increased time, Verbal cueing, Supervision for safety   Mobility Contact Guard Assist   Stairs    Barriers to Discharge Home:Pain, weakness, orthostatics      Occupational Therapy:  Grooming Modified Independent   Bathing Standby Assist   UB Dressing Minimal Assist   LB Dressing Moderate Assist   Toileting  (declined need; however pt stated that she has not been able to clean herself as well as usual)   Shower & Transfer    Barriers to Discharge Home:Pain, weakness, orthostatics      Respiratory Therapy:  O2 (LPM): 0  O2 Delivery Device: None - Room Air    Case Management:  Continues to work on disposition and DME needs.      Discharge Date/Disposition:  8/7/24  _____________________________________   Encounter Date: 8/1/2024    Chief Complaint: Weakness    Interval Events (Subjective):  Seen in chair. Tolerating therapy. Patient able to tolerate more therapy with midodrine onboard.    Objective:  VITAL SIGNS: /53   Pulse 74   Temp 37 °C (98.6 °F) (Temporal)   Resp 18   Ht 1.626 m (5' 4\")   Wt 76.5 kg (168 lb 10.4 oz)   SpO2 91%   BMI 28.95 kg/m²   Gen: No acute distress, well developed well nourished adult  HEENT: Normal Cephalic Atraumatic, Normal conjunctiva.   CV: warm extremities, well perfused, no edema  Resp: " symmetric chest rise, breathing comfortably on room air  Abd: Soft, Non distended  Extremities: normal bulk, no atrophy  Skin: no visible rashes or lesions.   Neuro: alert, awake  Psych: Mood and affect appropriate and congruent    Laboratory Values:  Recent Results (from the past 72 hour(s))   CBC WITHOUT DIFFERENTIAL    Collection Time: 07/30/24  5:52 AM   Result Value Ref Range    WBC 5.8 4.8 - 10.8 K/uL    RBC 3.28 (L) 4.20 - 5.40 M/uL    Hemoglobin 10.1 (L) 12.0 - 16.0 g/dL    Hematocrit 30.8 (L) 37.0 - 47.0 %    MCV 93.9 81.4 - 97.8 fL    MCH 30.8 27.0 - 33.0 pg    MCHC 32.8 32.2 - 35.5 g/dL    RDW 46.2 35.9 - 50.0 fL    Platelet Count 220 164 - 446 K/uL    MPV 10.5 9.0 - 12.9 fL   Comp Metabolic Panel    Collection Time: 07/30/24  5:52 AM   Result Value Ref Range    Sodium 141 135 - 145 mmol/L    Potassium 3.9 3.6 - 5.5 mmol/L    Chloride 106 96 - 112 mmol/L    Co2 23 20 - 33 mmol/L    Anion Gap 12.0 7.0 - 16.0    Glucose 107 (H) 65 - 99 mg/dL    Bun 23 (H) 8 - 22 mg/dL    Creatinine 0.72 0.50 - 1.40 mg/dL    Calcium 9.1 8.5 - 10.5 mg/dL    Correct Calcium 9.5 8.5 - 10.5 mg/dL    AST(SGOT) 65 (H) 12 - 45 U/L    ALT(SGPT) 102 (H) 2 - 50 U/L    Alkaline Phosphatase 286 (H) 30 - 99 U/L    Total Bilirubin 0.4 0.1 - 1.5 mg/dL    Albumin 3.5 3.2 - 4.9 g/dL    Total Protein 6.3 6.0 - 8.2 g/dL    Globulin 2.8 1.9 - 3.5 g/dL    A-G Ratio 1.3 g/dL   ESTIMATED GFR    Collection Time: 07/30/24  5:52 AM   Result Value Ref Range    GFR (CKD-EPI) 84 >60 mL/min/1.73 m 2       Medications:  Scheduled Medications   Medication Dose Frequency    midodrine  5 mg TID WITH MEALS    acetaminophen  1,000 mg TID    Pharmacy Consult Request  1 Each PHARMACY TO DOSE    senna-docusate  2 Tablet BID    omeprazole  20 mg DAILY    enoxaparin (LOVENOX) injection  40 mg DAILY AT 1800     PRN medications: Respiratory Therapy Consult, hydrALAZINE, senna-docusate **AND** polyethylene glycol/lytes, ondansetron **OR** ondansetron, sodium chloride,  oxyCODONE immediate-release **OR** oxyCODONE immediate-release    Diet:  Current Diet Order   Procedures    Diet Order Diet: Regular       Medical Decision Making and Plan:  Fracture of right (dominant side) humerus   - 3 part proximal humerus fracture   - s/p closed manipulation and treatment by Dr. Ramírez Denney MD on 7/16   - NWB RUE   - PT/OT continue  -patient does not qualify for home health due to insurance. To continue therapy she needs to do one step and get into her car. Extend inpatient stay to accomplish these goals     Fracture of left tibia   - Comminuted proximal tibia fracture   - S/p Open reduction internal fixation left tibial shaft with intramedullary nail by Dr. Ramírez Denney MD on 7/16   - WBAT LLE   - PT/OT     Acute blood loss anemia   - Hgb 9.6 on 7/21  -7/30- Hb 10.1     Orthostatic Hypotension   - Secondary to acute blood loss anemia and meds  - JACKIE hose   - Abdominal binder  -7/26- DC telmisartam  -7/30- start midodrine 5mg TID  -continue midodrine PRN at discharge  -check BP at home 2 times per day. If BP above 120 and not symptomatic patient can stop midodrine. If BP above 140 consistantly, call PCP to restart telmisartam     Hypertension   - was on Telmisartan 40mg Q evening   -DC telmisartan due to low bp   - If BP above 140 consistantly, call PCP to restart telmisartam    Pain:  - Neuroceptic - On Tylenol prn, continue oxycodone prn     DVT prophylaxis:  continue lovenox     GI prophylaxis:  On Prilosec 20mg daily         -Follow-up Ortho, PCP    ____________________________________    Derik Hays MD  Physical Medicine & Rehabilitation   Brain Injury Medicine   ____________________________________    Total time:  60 minutes. Time spent included pre-rounding, review of vitals and tests, unit/floor time, face-to-face time with the patient including physical examination, care coordination, counseling of patient and/or family, ordering medications/procedures/tests, discussion with  CM, PT, OT, SLP and/or other healthcare providers, and documentation in the electronic medical record. Topics discussed included dispostion.

## 2024-08-01 NOTE — CARE PLAN
The patient is Watcher - Medium risk of patient condition declining or worsening    Shift Goals  Clinical Goals: Safety, pain management  Patient Goals: Pain management, rest  Family Goals: no one present    Problem: Urinary Elimination  Goal: Establish and maintain regular urinary output  Outcome: Progressing    Patient is able to urinate in the bathroom without difficulty.     Problem: Mobility  Goal: Patient's capacity to carry out activities will improve  Outcome: Progressing    Patient is able to turn self in bed and is able to transfer with min-mod assistance.

## 2024-08-01 NOTE — CARE PLAN
Problem: Bathing  Goal: STG-Within one week, patient will bathe with min assist using LRAD as needed  Outcome: Met     Problem: Dressing  Goal: STG-Within one week, patient will dress UB with min assist using LRAD as needed  Outcome: Met  Goal: STG-Within one week, patient will dress LB with min assist using LRAD as needed  Outcome: Not Met     Problem: Functional Transfers  Goal: STG-Within one week, patient will transfer to toilet at SPV level using LRAD as needed  Outcome: Not Met     Problem: OT Long Term Goals  Goal: LTG-By discharge, patient will complete basic self care tasks at mod I level using LRAD as needed  Outcome: Progressing  Goal: LTG-By discharge, patient will perform bathroom transfers at mod I level using LRAD as needed  Outcome: Progressing

## 2024-08-01 NOTE — DISCHARGE SUMMARY
Physical Medicine & Rehabilitation Discharge Summary    Admission Date: 7/23/2024    Discharge Date: 8/7/2024    Attending Provider: Derik Hays MD    Admission Diagnosis:   Active Hospital Problems    Diagnosis     *Multiple trauma     Orthostatic dizziness     Closed fracture of right shoulder     Closed fracture of proximal end of left tibia, unspecified fracture morphology, initial encounter     Aortic root dilatation (HCC)     Essential (primary) hypertension        Discharge Diagnosis:  Active Hospital Problems    Diagnosis     *Multiple trauma     Orthostatic dizziness     Closed fracture of right shoulder     Closed fracture of proximal end of left tibia, unspecified fracture morphology, initial encounter     Aortic root dilatation (HCC)     Essential (primary) hypertension        HPI per Admission History & Physical:  The patient is a 79 y.o. right hand dominant female with a past medical history of HTN, bilateral knee pain, aortic root dilation, moderate aortic insufficiency, DLD, CKD; who presented on 7/15/2024 2:33 PM with right shoulder and left knee pain after mechanical GLF while hiking. She was seen by orthopedics and found to have a left tibial shaft fracture and right threepart proximal humerus fracture. She was taken to the OR by Dr. Ramírez Denney MD on 7/16 for ORIF left tibal shaft with IM nail and closed treatment of right humerus fracture. Postoperatively she is WBAT LLE and NWB with sling for RUE.     Patient was admitted to [facility] on 7/23/2024.     Hospital Course by Problem List:  Fracture of right (dominant side) humerus   - 3 part proximal humerus fracture   - s/p closed manipulation and treatment by Dr. Ramírez Denney MD on 7/16   - NWB RUE   - PT/OT continue in home     Fracture of left tibia   - Comminuted proximal tibia fracture   - S/p Open reduction internal fixation left tibial shaft with intramedullary nail by Dr. Ramírez Denney MD on 7/16   - WBAT  LLE      Acute blood loss anemia   - Hgb 9.6 on 7/21  -7/30- Hb 10.1     Orthostatic Hypotension   - Secondary to acute blood loss anemia and meds  - JACKIE hose   - Abdominal binder  -7/26- DC telmisartam  -7/30- start midodrine 5mg TID  -8/7- DC midodrine, if dizziness returns may take midodrine 5mg TID PRN as long as blood pressure remains below 120 systolic  -check BP at home 2 times per day. If BP above 140 consistantly, call PCP to restart telmisartam     Hypertension   - was on Telmisartan 40mg Q evening   -DC telmisartan due to low bp   - If BP above 140 consistantly, call PCP to restart telmisartam     Pain:  - Neuroceptic - On Tylenol prn, continue oxycodone prn  I discussed the risks and benefits of using opiate medications for pain control.  I discussed the risk of addiction, potential for overdose, and respiratory depression (and the potential need for opiate antagonist therapy if this occurs).  I encouraged the patient to take this medication sparingly with the expressed goal of weaning off the medication as soon as is clinically appropriate.  I informed the patient that we are only able to provide up to a 7 day supply of these medications at discharge and that they will be responsible for requesting any refills needed from their primary care provider or their surgeon.  We discussed the need to safely secure these medications to prevent theft, inadvertent ingestion, or misuse.  Any unused medication should be immediately disposed of through a sanctioned medication disposal program.  We discussed adjunctive pain medications and conservative therapies at length.I answered the patient's questions regarding this treatment, and the patient indicated understanding and willingness to proceed.      DVT prophylaxis:  ASA 81mg BID     GI prophylaxis:  On Prilosec 20mg daily         -Follow-up Ortho, PCP       Functional Status at Discharge  Eating:     Eating Description:     Grooming:  Modified  Independent  Grooming Description:  Seated in wheelchair at sink, Set-up of equipment  Bathing:  Standby Assist  Bathing Description:  Hand held shower, Supervision for safety, Tub bench, Long handled bath tool, Grab bar  Upper Body Dressing:  Minimal Assist  Upper Body Dressing Description:  Assit with threading arms through sleeves, Increased time  Lower Body Dressing:  Moderate Assist  Lower Body Dressing Description:   (assist for compression socks, assist for donning shoes. Able to don pants with setup assist)     Walk:  Contact Guard Assist  Distance Walked:  3 (4 steps once, then two step before rest)  Number of Times Distance Was Traveled:  2  Assistive Device:  Parallel Bars  Gait Deviation:  Antalgic, Step To, Bradykinetic, Decreased Toe Off  Wheelchair:  Stand by Assist  Distance Propelled:  50   Wheelchair Description:  Extra time, Leg rest management, Impaired coordination, Limited by fatigue (RLE and LUE management)  Stairs    Stairs Description       Comprehension:     Comprehension Description:     Expression:     Expression Description:     Social Interaction:     Social Interaction Description:     Problem Solving:     Problem Solving Description:     Memory:     Memory Description:          Derik CHAN M.D., personally performed a complete drug regimen review and no potential clinically significant medication issues were identified.   Discharge Medication:     Medication List        START taking these medications        Instructions   acetaminophen 500 MG Tabs  Commonly known as: Tylenol   Take 2 Tablets by mouth in the morning, at noon, and at bedtime.  Dose: 1,000 mg     midodrine 5 MG Tabs  Commonly known as: Proamatine   Take 1 Tablet by mouth 3 times a day as needed (dizziness).  Dose: 5 mg     oxyCODONE immediate-release 5 MG Tabs  Commonly known as: Roxicodone   Take 1 Tablet by mouth every 6 hours as needed for Severe Pain for up to 14 days. Wean off.  Dose: 5 mg            STOP  taking these medications      fluticasone 50 MCG/ACT nasal spray  Commonly known as: Flonase     gabapentin 100 MG Caps  Commonly known as: Neurontin     methocarbamol 500 MG Tabs  Commonly known as: Robaxin     MULTIVITAMIN ADULTS PO     telmisartan 40 MG Tabs  Commonly known as: Micardis              Discharge Diet:  Current Diet Order   Procedures    Diet Order Diet: Regular       Discharge Activity:  As tolerated     Disposition:  Patient to discharge home with family support and community resources.    Equipment:  Wheelchair, front wheeled walker    Follow-up & Discharge Instructions:  Follow up with your primary care provider (PCP) within 7-10 days of discharge to review your medications and take over your care.     If you develop chest pain, fever, chills, change in neurologic function (weakness, sensation changes, vision changes), or other concerning sxs, seek immediate medical attention or call 911.      Future Appointments   Date Time Provider Department Center   8/1/2024  2:00 PM Leigh Brito, OT [facility] None   8/1/2024  3:15 PM Shelby Smalls, PT [facility] None       Condition on Discharge:  Good    More than 35 minutes was spent on discharging this patient, including face-to-face time, prescription management, and the dictation of this note.    ____________________________________  Derik Hays MD

## 2024-08-01 NOTE — CARE PLAN
Problem: Pain - Standard  Goal: Alleviation of pain or a reduction in pain to the patient’s comfort goal  Outcome: Progressing   Patient able to verbalize pain on a 0/10 scale. Patients pain is being controlled with prn pain medications and rest.

## 2024-08-01 NOTE — PROGRESS NOTES
NURSING DAILY NOTE    Name: Gali Broderick   Date of Admission: 7/23/2024   Admitting Diagnosis: Multiple trauma  Attending Physician: Derik Hays M.d.  Allergies: Iodine    Safety  Patient Assist  Min assist.  Patient Precautions  Weight Bearing As Tolerated Left Lower Extremity, Non Weight Bearing Right Upper Extremity  Precaution Comments  Orthostatic hypotension  Bed Transfer Status  Contact Guard Assist  Toilet Transfer Status   Contact Guard Assist  Assistive Devices  Wheelchair, Rails  Oxygen  None - Room Air  Diet/Therapeutic Dining  Current Diet Order   Procedures    Diet Order Diet: Regular     Pill Administration  whole  Agitated Behavioral Scale     ABS Level of Severity       Fall Risk  Has the patient had a fall this admission?   No  Fifi Evangelista Fall Risk Scoring  15, HIGH RISK  Fall Risk Safety Measures  bed alarm, chair alarm, and poor balance    Vitals  Temperature: 36.4 °C (97.6 °F)  Temp src: Oral  Pulse: (!) 105  Respiration: 20  Blood Pressure : 122/62  Blood Pressure MAP (Calculated): 82 MM HG  BP Location: Left, Upper Arm  Patient BP Position: Standing 3 minutes     Oxygen  Pulse Oximetry: 97 %  O2 (LPM): 0  O2 Delivery Device: None - Room Air    Bowel and Bladder  Last Bowel Movement  07/31/24  Stool Type  Type 4: Like a sausage or snake, smooth and soft  Bowel Device  Bathroom  Continent  Bladder: Did not void   Bowel: No movement  Bladder Function  Urine Void (mL):  (moderate)  Number of Times Voided: 1  Urine Color: Unable To Evaluate  Genitourinary Assessment   Bladder Assessment (WDL):  Within Defined Limits  Ariza Catheter: Not Applicable  Urine Color: Unable To Evaluate  Bladder Device: Bathroom  Bladder Scan: Post Void  $ Bladder Scan Results (mL): 6    Skin  Sascha Score   17  Sensory Interventions   Skin Preventative Measures: Pillows in Use for Support / Positioning  Moisture Interventions  Moisturizers/Barriers:  Barrier Wipes      Pain  Pain Rating Scale  6 - Hard to ignore, avoid usual activities  Pain Location  Arm  Pain Location Orientation  Right  Pain Interventions   Medication (see MAR)    ADLs    Bathing    (shower with ot)  Linen Change      Personal Hygiene  Moist Estefany Wipes, Perineal Care  Chlorhexidine Bath      Oral Care     Teeth/Dentures     Shave     Nutrition Percentage Eaten  Between 50-75% Consumed  Environmental Precautions  Treaded Slipper Socks on Patient  Patient Turns/Positioning  Patient Turns Self from Side to Side  Patient Turns Assistance/Tolerance     Bed Positions  Bed Controls On, Bed Locked  Head of Bed Elevated  Self regulated      Psychosocial/Neurologic Assessment  Psychosocial Assessment  Psychosocial (WDL):  Within Defined Limits  Neurologic Assessment  Neuro (WDL): Within Defined Limits  Level of Consciousness: Alert  Orientation Level: Oriented X4  Cognition: Appropriate judgement, Follows commands  Speech: Clear  Motor Function/Sensation Assessment: Sensation  RUE Sensation: Tingling, Pain  LLE Sensation: Tingling, Pain, Other (Comment)  EENT (WDL):  Within Defined Limits    Cardio/Pulmonary Assessment  Edema   RUE Edema: Generalized  LLE Edema: Generalized  Respiratory Breath Sounds  RUL Breath Sounds: Clear  RML Breath Sounds: Clear  RLL Breath Sounds: Clear  BERTA Breath Sounds: Clear  LLL Breath Sounds: Clear  Cardiac Assessment   Cardiac (WDL):  WDL Except (aortic insuff.)

## 2024-08-01 NOTE — CARE PLAN
The patient is Stable - Low risk of patient condition declining or worsening    Shift Goals  Clinical Goals: safety, sleep  Patient Goals: sleep  Family Goals: no one present    Progress made toward(s) clinical / shift goals:    Problem: Knowledge Deficit - Standard  Goal: Patient and family/care givers will demonstrate understanding of plan of care, disease process/condition, diagnostic tests and medications  Outcome: Progressing  Note: Pt educated and states understanding of POC and disease process. All questions answered at this time. POC ongoing, call light within reach.

## 2024-08-01 NOTE — DISCHARGE PLANNING
Case Management/IDT follow up.   Projected dc date: changed to 8/7 due to pt's limited mobility status and pain management.    IDT continues to recommend IRF level of care as patient continue to make slow progress with all therapies.     DC needs  Home health:  not an option as Insurance Co's do not accept her insurance.  Therefore, out pt PT  DME:  Jelani walker ordered thru [employer];  pt also has a wheelchair.   Family training:    Follow up:   PCP:  Other: Ortho     Disabled parking permit application completed and faxed to [employer]    I met with patient  providing update from IDT and discussed plan of care.  They are in agreement w/ plan.     Plan:  Continue to follow

## 2024-08-01 NOTE — THERAPY
Occupational Therapy  Daily Treatment     Patient Name: Gali Broderick  Age:  79 y.o., Sex:  female  Medical Record #: [id number]  Today's Date: 8/1/2024     Precautions  Precautions: Weight Bearing As Tolerated Left Lower Extremity, Non Weight Bearing Right Upper Extremity  Comments: Orthostatic hypotension         Subjective    Pt states that she would like to work on her handwriting so that she can sign documents if needed.     Objective       08/01/24 0931 08/01/24 1401   OT Charge Group   OT Self Care / ADL (Units) 3  --    OT Therapy Activity (Units)  --  2   OT Therapeutic Exercise (Units)  --  1   OT Total Time Spent   OT Individual Total Time Spent (Mins) 45 45   Functional Level of Assist   Bathing Supervision  --    Bathing Description Grab bar;Hand held shower;Tub bench;Set-up of equipment  --    Upper Body Dressing Minimal Assist  --    Upper Body Dressing Description Assit with threading arms through sleeves  --    Lower Body Dressing Contact Guard Assist  (pants)  --    Lower Body Dressing Description Set-up of equipment;Supervision for safety;Initial preparation for task  --    Bed, Chair, Wheelchair Transfer Contact Guard Assist Contact Guard Assist   Tub / Shower Transfers Contact Guard Assist  (to zero entry shower)   (see comments)   Tub Shower Transfer Description Shower bench;Grab bar;Initial preparation for task;Set-up of equipment;Supervision for safety  --    Sitting Upper Body Exercises   Upper Extremity Bike  --  Level 3 Resistance  (Left arm only, 8 mins)   Fine Motor / Dexterity    Comments   --  Pt wrote name with right hand several times by resting R arm on table for support/stability. Hand shaking during handwriting and needed to take a break in between trials.     Tub/Shower Transfer Comments: Per pt, she has a walk in shower at home with a 3-4 inch threshold. During second OT session, pt stood in // bars facing forward and stepped over a 1.5 inch bar to simulate stepping over shower  threshold. Pt only able to do this 1x due to pain. Able to complete with CGA but did so uncontrolled.   Assessment    Pt seen for OT tx sessions twice this day. Second session focused on LUE strengthening and crossing short threshold to simulate shower transfer (see above). Pt is currently not safe to trial shower transfer in ADL bathroom until she can clear lower thresholds with more control. She would benefit from a tub transfer bench for walk in shower at home.    Strengths: Able to follow instructions, Alert and oriented, Effective communication skills, Independent prior level of function, Manages pain appropriately, Motivated for self care and independence, Pleasant and cooperative, Supportive family, Willingly participates in therapeutic activities  Barriers: Decreased endurance, Generalized weakness, Impaired balance, Limited mobility    Plan    Safety with walk in shower transfer  Discuss tub transfer bench vs shower chair with the pt and   LUE strengthening  [place] and safety with functional transfers and ADLs    DME  OT DME Recommendations  Bathroom Equipment:  TUB TRANSFER BENCH    Passport items to be completed:  Perform bathroom transfers, complete dressing, complete feeding, get ready for the day, prepare a simple meal, participate in household tasks, adapt home for safety needs, demonstrate home exercise program, complete caregiver training     Occupational Therapy Goals (Active)       Problem: Dressing       Dates: Start:  07/25/24         Goal: STG-Within one week, patient will dress LB with min assist using LRAD as needed       Dates: Start:  07/25/24               Problem: Functional Transfers       Dates: Start:  07/25/24         Goal: STG-Within one week, patient will transfer to toilet at SPV level using LRAD as needed       Dates: Start:  07/25/24               Problem: OT Long Term Goals       Dates: Start:  07/25/24         Goal: LTG-By discharge, patient will complete basic  self care tasks at mod I level using LRAD as needed       Dates: Start:  07/25/24            Goal: LTG-By discharge, patient will perform bathroom transfers at mod I level using LRAD as needed       Dates: Start:  07/25/24

## 2024-08-02 ENCOUNTER — APPOINTMENT (OUTPATIENT)
Dept: PHYSICAL THERAPY | Facility: REHABILITATION | Age: 79
DRG: 560 | End: 2024-08-02
Attending: PHYSICAL MEDICINE & REHABILITATION
Payer: COMMERCIAL

## 2024-08-02 ENCOUNTER — ANCILLARY PROCEDURE (OUTPATIENT)
Dept: CARDIOLOGY | Facility: REHABILITATION | Age: 79
DRG: 560 | End: 2024-08-02
Attending: PHYSICAL MEDICINE & REHABILITATION
Payer: COMMERCIAL

## 2024-08-02 ENCOUNTER — APPOINTMENT (OUTPATIENT)
Dept: OCCUPATIONAL THERAPY | Facility: REHABILITATION | Age: 79
DRG: 560 | End: 2024-08-02
Attending: PHYSICAL MEDICINE & REHABILITATION
Payer: COMMERCIAL

## 2024-08-02 PROCEDURE — 700101 HCHG RX REV CODE 250: Performed by: PHYSICAL MEDICINE & REHABILITATION

## 2024-08-02 PROCEDURE — 99233 SBSQ HOSP IP/OBS HIGH 50: CPT | Performed by: PHYSICAL MEDICINE & REHABILITATION

## 2024-08-02 PROCEDURE — 700102 HCHG RX REV CODE 250 W/ 637 OVERRIDE(OP): Performed by: PHYSICAL MEDICINE & REHABILITATION

## 2024-08-02 PROCEDURE — 700111 HCHG RX REV CODE 636 W/ 250 OVERRIDE (IP): Mod: JZ | Performed by: PHYSICAL MEDICINE & REHABILITATION

## 2024-08-02 PROCEDURE — 93971 EXTREMITY STUDY: CPT | Mod: LT

## 2024-08-02 PROCEDURE — 97110 THERAPEUTIC EXERCISES: CPT

## 2024-08-02 PROCEDURE — A9270 NON-COVERED ITEM OR SERVICE: HCPCS | Performed by: PHYSICAL MEDICINE & REHABILITATION

## 2024-08-02 PROCEDURE — 97530 THERAPEUTIC ACTIVITIES: CPT

## 2024-08-02 PROCEDURE — 97116 GAIT TRAINING THERAPY: CPT

## 2024-08-02 PROCEDURE — 770010 HCHG ROOM/CARE - REHAB SEMI PRIVAT*

## 2024-08-02 RX ORDER — LIDOCAINE 4 G/G
1 PATCH TOPICAL DAILY
Status: DISCONTINUED | OUTPATIENT
Start: 2024-08-02 | End: 2024-08-07 | Stop reason: HOSPADM

## 2024-08-02 RX ADMIN — LIDOCAINE 1 PATCH: 4 PATCH TOPICAL at 15:41

## 2024-08-02 RX ADMIN — ACETAMINOPHEN 1000 MG: 500 TABLET, FILM COATED ORAL at 20:16

## 2024-08-02 RX ADMIN — MIDODRINE HYDROCHLORIDE 5 MG: 2.5 TABLET ORAL at 11:32

## 2024-08-02 RX ADMIN — OXYCODONE HYDROCHLORIDE 5 MG: 5 TABLET ORAL at 13:06

## 2024-08-02 RX ADMIN — MIDODRINE HYDROCHLORIDE 5 MG: 2.5 TABLET ORAL at 08:26

## 2024-08-02 RX ADMIN — ENOXAPARIN SODIUM 40 MG: 100 INJECTION SUBCUTANEOUS at 17:29

## 2024-08-02 RX ADMIN — OXYCODONE HYDROCHLORIDE 5 MG: 5 TABLET ORAL at 05:49

## 2024-08-02 RX ADMIN — ACETAMINOPHEN 1000 MG: 500 TABLET, FILM COATED ORAL at 08:26

## 2024-08-02 RX ADMIN — MIDODRINE HYDROCHLORIDE 5 MG: 2.5 TABLET ORAL at 17:29

## 2024-08-02 RX ADMIN — ACETAMINOPHEN 1000 MG: 500 TABLET, FILM COATED ORAL at 15:41

## 2024-08-02 ASSESSMENT — PATIENT HEALTH QUESTIONNAIRE - PHQ9
1. LITTLE INTEREST OR PLEASURE IN DOING THINGS: NOT AT ALL
SUM OF ALL RESPONSES TO PHQ9 QUESTIONS 1 AND 2: 0
2. FEELING DOWN, DEPRESSED, IRRITABLE, OR HOPELESS: NOT AT ALL

## 2024-08-02 ASSESSMENT — ACTIVITIES OF DAILY LIVING (ADL)
BED_CHAIR_WHEELCHAIR_TRANSFER_DESCRIPTION: INCREASED TIME;INITIAL PREPARATION FOR TASK;SUPERVISION FOR SAFETY
BED_CHAIR_WHEELCHAIR_TRANSFER_DESCRIPTION: INCREASED TIME;INITIAL PREPARATION FOR TASK;SUPERVISION FOR SAFETY

## 2024-08-02 ASSESSMENT — GAIT ASSESSMENTS
ASSISTIVE DEVICE: PARALLEL BARS
DISTANCE (FEET): 0
DEVIATION: ANTALGIC
GAIT LEVEL OF ASSIST: CONTACT GUARD ASSIST

## 2024-08-02 ASSESSMENT — PAIN DESCRIPTION - PAIN TYPE: TYPE: ACUTE PAIN

## 2024-08-02 NOTE — CARE PLAN
The patient is Stable - Low risk of patient condition declining or worsening    Shift Goals  Clinical Goals: safety  Patient Goals: sleep well, pain control  Family Goals: no one present    Progress made toward(s) clinical / shift goals:    Problem: Pain - Standard  Goal: Alleviation of pain or a reduction in pain to the patient’s comfort goal  Outcome: Progressing. Patient states pain tolerable over night, received her scheduled tylenol at bedtime but refused prn oxycodone over shift. Patient is now on scheduled bid oxycodone twice in morning during therapy hours to ensure pain is covered during therapy.      Problem: Fall Risk - Rehab  Goal: Patient will remain free from falls  Outcome: Progressing. Patient bed in lowest locked position with bed alarm on and call light within reach. Patient calls appropriately with call light for needs and has not attempted to self transfer over shift.

## 2024-08-02 NOTE — DISCHARGE PLANNING
Cm   Met w/ pt and spouse; they are in agreement w/ dc plan and date changing to 8/7.  Informed them kari walker has been ordered.  They have a  wc at home; spouse plans to install portable ramp and order a tub transfer bench. Choice for out pt PT/OT is [facility].  Pt indicates scheduled pain meds have made a difference.     Plan:  8/7.

## 2024-08-02 NOTE — THERAPY
Physical Therapy   Daily Treatment     Patient Name: Gali Broderick  Age:  79 y.o., Sex:  female  Medical Record #: [id number]  Today's Date: 8/2/2024     Precautions  Precautions: Weight Bearing As Tolerated Left Lower Extremity, Non Weight Bearing Right Upper Extremity  Comments: Orthostatic hypotension    Subjective    Patient was found seated in wheelchair for both morning and afternoon session and was agreeable to therapy. In morning patient reported more pain in her R arm but did not report pain in leg until attempting to walk.      Objective       08/02/24 0831 08/02/24 1415   PT Charge Group   PT Gait Training (Units) 1  --    PT Therapeutic Exercise (Units) 2 3   PT Total Time Spent   PT Individual Total Time Spent (Mins) 45 45   Pain 0 - 10 Group   Location Arm;Knee  --    Location Orientation Right;Left  --    Pain Rating Scale (NPRS)   (said arm was 6/10 at rest; leg was okay until attempting to walk)  --    Comfort Goal Perform Activity  --    Gait Functional Level of Assist    Gait Level Of Assist Contact Guard Assist  --    Assistive Device Parallel Bars  --    Distance (Feet) 0  (Patient was unable to take steps today; although several steps were attempted. Practiced weight shifting on R leg to L arm while standing on one foot. Performed 2x10 toes raises on R leg)  --    Deviation Antalgic  --    Transfer Functional Level of Assist   Bed, Chair, Wheelchair Transfer Standby Assist  --    Bed Chair Wheelchair Transfer Description Increased time;Initial preparation for task;Supervision for safety  --    Sitting Lower Body Exercises   Nustep Resistance Level 3  (10 min, 404 steps)  --    Other Exercises Tricep dips from chair  (4x10) tricep press dip in WC 2x10, tricep press with rickshaw 2x10 w/ 10lbs, 1x10 w/ 15 lbs   Standing Lower Body Exercises   Other Exercises R arm push up against candice table  --    Bed Mobility    Supine to Sit Supervised  --    Sit to Supine Supervised  --    Sit to Stand Standby  Assist Standby Assist   Interdisciplinary Plan of Care Collaboration   IDT Collaboration with   --  Family / Caregiver   Patient Position at End of Therapy Seated;Edge of Bed;Call Light within Reach;Tray Table within Reach;Phone within Reach Seated;Call Light within Reach;Tray Table within Reach;Family / Friend in Room;Phone within Reach;Self Releasing Lap Belt Applied   Collaboration Comments  --   present throughout session   Car Transfer   Assistance Needed  --  Verbal cues;Supervision;Set-up / clean-up;Adaptive equipment   Physical Assistance Level  --  No physical assistance   Comment  --  able to perform car trasnfer with increased time, hemiwalker and contact guard assist   CARE Score - Car Transfer  --  4     Afternoon session cont'd  3x10 push ups against candice table  Worked on weight transfer between R leg and L arm to offload L leg.   Pre-gait in paralell bars: weight shifting from R leg to L arm. Toe raises standing on R leg only and with TTWB of L leg    Assessment    Patient continues to be limited by pain in her left leg. Today she was unable to take full steps due to pain in her left leg. Patient struggles fully offloading L leg during gait due to weakness of UE and difficulty with motor planning. Patient's ROM of left leg and endurance are still improving. Patient was able to safely complete car transfer due to increased ROM and strength in L leg which she was previously unable to perform. Patient would continue to benefit from UE strengthening and practice transferring weight from RLE to LUE in order to offload LLE during gait.     Strengths: Able to follow instructions, Alert and oriented, Effective communication skills, Independent prior level of function, Motivated for self care and independence, Pleasant and cooperative, Willingly participates in therapeutic activities, Supportive family  Barriers: Decreased endurance, Fatigue, Generalized weakness, Impaired activity tolerance, Impaired  balance, Limited mobility, Pain, Impulsive    Plan    L UE strengthening (triceps)  Progressing to gait with jelani walker  Strengthening in all planes  Balance  Transfer safety  Car transfers and stairs as able    DME  PT DME Recommendations  Assistive Device: Jelani Walker    Passport items to be completed:  Get in/out of bed safely, in/out of a vehicle, safely use mobility device, walk or wheel around home/community, navigate up and down stairs, show how to get up/down from the ground, ensure home is accessible, demonstrate HEP, complete caregiver training    Physical Therapy Problems (Active)       Problem: Balance       Dates: Start:  07/24/24         Goal: STG-Within one week, patient will tolerate caputo balance test.       Dates: Start:  07/24/24               Problem: Mobility       Dates: Start:  07/24/24         Goal: STG-Within one week, patient will ambulate 10 ft using hemiwalker with contact guard assist.        Dates: Start:  07/24/24            Goal: STG-Within one week, patient will ambulate up/down a curb using hemiwalker and contact guard assist.        Dates: Start:  07/24/24               Problem: Mobility Transfers       Dates: Start:  07/24/24         Goal: STG-Within one week, patient will transfer bed to chair with stand pivot transfer safely with supervision assist.        Dates: Start:  07/24/24               Problem: PT-Long Term Goals       Dates: Start:  07/24/24         Goal: LTG-By discharge, patient will receive >35/56 on caputo balance test.        Dates: Start:  07/24/24            Goal: LTG-By discharge, patient will ambulate 100 ft using hemiwalker or LRAD with supervision assist.        Dates: Start:  07/24/24            Goal: LTG-By discharge, patient will perform home exercise program using handouts independently.        Dates: Start:  07/24/24            Goal: LTG-By discharge, patient will transfer in/out of a car using hemiwalker with supervision assist.        Dates: Start:   07/24/24

## 2024-08-02 NOTE — PROGRESS NOTES
NURSING DAILY NOTE    Name: Gali Broderick   Date of Admission: 7/23/2024   Admitting Diagnosis: Multiple trauma  Attending Physician: Derik Hays M.d.  Allergies: Iodine    Safety  Patient Assist  Min assist.  Patient Precautions  Weight Bearing As Tolerated Left Lower Extremity, Non Weight Bearing Right Upper Extremity  Precaution Comments  Orthostatic hypotension  Bed Transfer Status  Contact Guard Assist  Toilet Transfer Status   Contact Guard Assist  Assistive Devices  Rails, Cane - quad, Wheelchair  Oxygen  None - Room Air  Diet/Therapeutic Dining  Current Diet Order   Procedures    Diet Order Diet: Regular     Pill Administration  whole  Agitated Behavioral Scale     ABS Level of Severity       Fall Risk  Has the patient had a fall this admission?   No  Fifi Evangelista Fall Risk Scoring  15, HIGH RISK  Fall Risk Safety Measures  bed alarm and chair alarm    Vitals  Temperature: 36.3 °C (97.3 °F)  Temp src: Oral  Pulse: 75  Respiration: 16  Blood Pressure : (!) 140/50  Blood Pressure MAP (Calculated): 80 MM HG  BP Location: Left, Upper Arm  Patient BP Position: Sitting     Oxygen  Pulse Oximetry: 96 %  O2 (LPM): 0  O2 Delivery Device: None - Room Air    Bowel and Bladder  Last Bowel Movement  07/31/24  Stool Type  Type 4: Like a sausage or snake, smooth and soft  Bowel Device  Bathroom  Continent  Bladder: Did not void   Bowel: No movement  Bladder Function  Urine Void (mL):  (mod)  Number of Times Voided: 1  Urine Color: Yellow  Genitourinary Assessment   Bladder Assessment (WDL):  Within Defined Limits  Ariza Catheter: Not Applicable  Urine Color: Yellow  Bladder Device: Bathroom  Bladder Scan: Post Void  $ Bladder Scan Results (mL): 6    Skin  Sascha Score   17  Sensory Interventions   Skin Preventative Measures: Pillows in Use for Support / Positioning  Moisture Interventions  Moisturizers/Barriers: Barrier Paste, Barrier Wipes      Pain  Pain  Rating Scale  7 - Focus of attention, prevents doing daily activities  Pain Location  Knee  Pain Location Orientation  Left  Pain Interventions   Medication (see MAR) (wants pain meds with NOC med pass)    ADLs    Bathing    (shower with ot)  Linen Change      Personal Hygiene  Moist Estefany Wipes, Perineal Care  Chlorhexidine Bath      Oral Care     Teeth/Dentures     Shave     Nutrition Percentage Eaten  Between 50-75% Consumed  Environmental Precautions  Treaded Slipper Socks on Patient, Bed in Low Position  Patient Turns/Positioning  Patient Turns Self from Side to Side  Patient Turns Assistance/Tolerance     Bed Positions  Bed Controls On, Bed Locked  Head of Bed Elevated  Self regulated      Psychosocial/Neurologic Assessment  Psychosocial Assessment  Psychosocial (WDL):  Within Defined Limits  Neurologic Assessment  Neuro (WDL): Within Defined Limits  Level of Consciousness: Alert  Orientation Level: Oriented X4  Cognition: Appropriate judgement, Follows commands  Speech: Clear  Motor Function/Sensation Assessment: Sensation  RUE Sensation: Tingling, Pain  LLE Sensation: Pain, Tingling  EENT (WDL):  WDL Except    Cardio/Pulmonary Assessment  Edema   RUE Edema: Generalized  LLE Edema: Generalized  Respiratory Breath Sounds  RUL Breath Sounds: Clear  RML Breath Sounds: Clear  RLL Breath Sounds: Clear  BERTA Breath Sounds: Clear  LLL Breath Sounds: Clear  Cardiac Assessment   Cardiac (WDL):  WDL Except (aortic insufficiency)

## 2024-08-02 NOTE — PROGRESS NOTES
NURSING DAILY NOTE    Name: Gali Broderick  Date of Admission: 7/23/2024  Admitting Diagnosis: Multiple trauma  Attending Physician: Derik Hays M.d.  Allergies: Iodine    Safety  Patient Assist  ocga  Patient Precautions  oWeight Bearing As Tolerated Left Lower Extremity, Non Weight Bearing Right Upper Extremity  Precaution Comments  oOrthostatic hypotension  Bed Transfer Status  oContact Guard Assist  Toilet Transfer Status  oContact Guard Assist  Assistive Devices  oWheelchair, Rails  Oxygen  oNone - Room Air  Diet/Therapeutic Dining  o  Current Diet Order   Procedures    Diet Order Diet: Regular     Pill Administration  owhole  Agitated Behavioral Scale  o   ABS Level of Severity  o     Fall Risk  Has the patient had a fall this admission?  Edmar  Fifi Evangelista Fall Risk Scoring  o15, HIGH RISK  Fall Risk Safety Measures  rashdi alarm and chair alarm    Vitals  Temperature: 36.3 °C (97.4 °F)  Temp src: Oral  Pulse: 75  Respiration: 16  Blood Pressure : 126/47  Blood Pressure MAP (Calculated): 73 MM HG  BP Location: Left, Upper Arm  Patient BP Position: Supine    Oxygen  Pulse Oximetry: 93 %  O2 (LPM): 0  O2 Delivery Device: None - Room Air    Bowel and Bladder  Last Bowel Movement  o07/31/24  Stool Type  oType 4: Like a sausage or snake, smooth and soft  Bowel Device  oBathroom  Continent  oBladder: Did not void  oBowel: No movement  Bladder Function  oUrine Void (mL):  (mod)  Number of Times Voided: 1  Urine Color: Yellow  Genitourinary Assessment  oBladder Assessment (WDL):  Within Defined Limits  Ariza Catheter: Not Applicable  Urine Color: Yellow  Bladder Device: Bathroom  Bladder Scan: Post Void  $ Bladder Scan Results (mL): 6    Skin  Sascha Score  o 17  Sensory Interventions  o Skin Preventative Measures: Pillows in Use for Support / Positioning  Moisture Interventions  oMoisturizers/Barriers: Barrier Paste, Barrier Wipes      Pain  Pain Rating Scale  o7 - Focus of attention, prevents doing daily  activities  Pain Location  oKnee  Pain Location Orientation  oLeft  Pain Interventions  oMedication (see MAR)    ADLs    Bathing  o (shower with ot)  Linen Change  o   Personal Hygiene  oMoist Estefany Wipes, Perineal Care  Chlorhexidine Bath  o   Oral Care  o   Teeth/Dentures  o   Shave  o   Nutrition Percentage Eaten  oBetween 50-75% Consumed  Environmental Precautions  oTreaded Slipper Socks on Patient, Personal Belongings, Wastebasket, [employer] etc. in Easy Reach, Transferred to Stronger Side, Bed in Low Position  Patient Turns/Positioning  oPatient Turns Self from Side to Side  Patient Turns Assistance/Tolerance  o   Bed Positions  Roxy Controls On, Bed Locked  Head of Bed Elevated  oSelf regulated      Psychosocial/Neurologic Assessment  Psychosocial Assessment  oPsychosocial (WDL):  Within Defined Limits  Neurologic Assessment  oNeuro (WDL): Within Defined Limits  Level of Consciousness: Alert  Orientation Level: Oriented X4  Cognition: Appropriate judgement, Follows commands  Speech: Clear  Motor Function/Sensation Assessment: Sensation  RUE Sensation: Tingling, Pain  LLE Sensation: Pain, Tingling  oEENT (WDL):  WDL Except    Cardio/Pulmonary Assessment  Edema  oRUE Edema: Generalized  LLE Edema: Generalized  Respiratory Breath Sounds  oRUL Breath Sounds: Clear  RML Breath Sounds: Clear  RLL Breath Sounds: Clear  BERTA Breath Sounds: Clear  LLL Breath Sounds: Clear  Cardiac Assessment  oCardiac (WDL):  WDL Except

## 2024-08-02 NOTE — DISCHARGE PLANNING
Case Management / Initial authorization:  Auth # [id number]  Days authorized: 7/23 to 7/30/2024 for a total of 8 days.     Tc from [employer] [place] requesting clinical information faxed to him  Phone:  [phone]   Fax: [phone]     Outcome: pending.

## 2024-08-02 NOTE — CARE PLAN
The patient is Watcher - Medium risk of patient condition declining or worsening    Shift Goals  Clinical Goals: safety    Problem: Wound/ / Incision Healing  Goal: Patient's wound/surgical incision will decrease in size and heals properly  Note: Staples and sutures removed per order, photos uploaded, steri strips in place.      Problem: Pain - Standard  Goal: Alleviation of pain or a reduction in pain to the patient’s comfort goal  Note: Pain to LLE, scheduled pain meds have been given

## 2024-08-02 NOTE — PROGRESS NOTES
"  Physical Medicine & Rehabilitation Progress Note    Encounter Date: 8/2/2024    Chief Complaint: Left lower extremity pain and swelling    Interval Events (Subjective):    She was evaluated in her room lying comfortably in bed.  She complains of increasing pain over the medial aspect of her left knee.  She also wrapped orts mild increase in swelling over the left lower extremity, she has not had an ultrasound of her left lower extremity to evaluate for DVT in the past.    She has not trialed lidocaine patches for pain management in the past.      ROS:  Gen: No fatigue, confusion, significant weight loss  Eyes: no blurry vision, double vision or pain in eyes  ENT: no changes in hearing, runny nose, nose bleeds, sinus pain  CV: No CP, palpitations,   Lungs: no shortness of breath, changes in secretions, changes in cough, pain with coughing  Abd: No abd pain, nausea, vomiting, pain with eating  : no blood in urine, pain during storage phase, bladder spasms, suprapubic pain  Ext: No swelling in legs, asymmetric atrophy  Neuro: no changes in strength or sensation  Skin: no new ulcers/skin breakdown appreciated by patient  Mood: No changes in mood today, increase in depression or anxiety  Heme: no bruising, or bleeding    Objective:  Vitals: /50   Pulse 94   Temp 36.7 °C (98 °F) (Oral)   Resp 12   Ht 1.626 m (5' 4\")   Wt 76.5 kg (168 lb 10.4 oz)   SpO2 93%   Gen: NAD, Body mass index is 28.95 kg/m².  HEENT: NC/AT, PERRLA, moist mucous membranes  Cardio: RRR, no mumurs  Pulm: CTAB, with normal respiratory effort  Abd: Soft NTND, active bowel sounds,   Ext: increased left LE edema.  Pinpoint tenderness over the left Pez anserine bursa and the medial hamstrings. No clubbing/cyanosis. +dorsal pedalis pulses bilaterally.      Laboratory Values:  No results found for this or any previous visit (from the past 72 hour(s)).    Medications:  Scheduled Medications   Medication Dose Frequency    oxyCODONE " immediate-release  5 mg BID    midodrine  5 mg TID WITH MEALS    acetaminophen  1,000 mg TID    Pharmacy Consult Request  1 Each PHARMACY TO DOSE    senna-docusate  2 Tablet BID    omeprazole  20 mg DAILY    enoxaparin (LOVENOX) injection  40 mg DAILY AT 1800     PRN medications: Respiratory Therapy Consult, hydrALAZINE, senna-docusate **AND** polyethylene glycol/lytes, ondansetron **OR** ondansetron, sodium chloride, oxyCODONE immediate-release **OR** oxyCODONE immediate-release    Diet:  Current Diet Order   Procedures    Diet Order Diet: Regular       Assessment:  Active Hospital Problems    Diagnosis     *Multiple trauma     Orthostatic dizziness     Closed fracture of right shoulder     Closed fracture of proximal end of left tibia, unspecified fracture morphology, initial encounter     Aortic root dilatation (HCC)     Essential (primary) hypertension        Medical Decision Making and Plan:    Modified from Dr. Hays's to progress on 8/1/2024    Fracture of right (dominant side) humerus   - 3 part proximal humerus fracture   - s/p closed manipulation and treatment by Dr. Ramírez Denney MD on 7/16   - NWB RUE   - Remove staples on 8/2, greater than 14 days  -Continue comprehensive acute inpatient rehabilitation  -patient does not qualify for home health due to insurance. To continue therapy she needs to do one step and get into her car. Extend inpatient stay to accomplish these goals     Fracture of left tibia   - Comminuted proximal tibia fracture   - S/p Open reduction internal fixation left tibial shaft with intramedullary nail by Dr. Ramírez Denney MD on 7/16   - WBAT LLE   - PT/OT     Left lower extremity swelling:  - Stat left lower extremity venous Doppler to evaluate for possible DVT in the setting of acute onset of left lower extremity swelling    Acute blood loss anemia   - Hgb 9.6 on 7/21  -7/30- Hb 10.1     Orthostatic Hypotension   - Secondary to acute blood loss anemia and meds  - JACKIE hose   -  Abdominal binder  -7/26- DC telmisartam  -7/30- start midodrine 5mg TID  -continue midodrine PRN at discharge  -check BP at home 2 times per day. If BP above 120 and not symptomatic patient can stop midodrine. If BP above 140 consistantly, call PCP to restart telmisartam     Hypertension   - was on Telmisartan 40mg Q evening   -DC telmisartan due to low bp   - If BP above 140 consistantly, call PCP to restart telmisartam     Pain:  - Neuroceptic - On Tylenol prn, continue oxycodone prn     #Left Pez anserine bursitis:  -Localized pain over the left Pez anserine bursa  - Apply ice 3-4 times per day  - Start lidocaine patch over the area on for 12 hours off for 12 hours    DVT prophylaxis:  continue lovenox     GI prophylaxis:  On Prilosec 20mg daily         -Follow-up Ortho, PCP    ____________________________________    Elias Welch DO  [facility] - Physical Medicine & Rehabilitation   [facility] - Spinal Cord Injury Medicine  ____________________________________    Total time:  58 minutes.  I spent greater than 50% of the time for patient care and coordination on this date, including unit/floor time, and face-to-face time with the patient as per assessment and plan above.  Discussion included left lower extremity swelling, pinpoint tenderness over the left Pez anserine bursa, will hold portion of occupational therapy secondary to medical workup and tolerance, started lidocaine patches, left lower extremity swelling, stat left lower extremity ultrasound.

## 2024-08-03 PROCEDURE — 700102 HCHG RX REV CODE 250 W/ 637 OVERRIDE(OP): Performed by: PHYSICAL MEDICINE & REHABILITATION

## 2024-08-03 PROCEDURE — 700111 HCHG RX REV CODE 636 W/ 250 OVERRIDE (IP): Mod: JZ | Performed by: PHYSICAL MEDICINE & REHABILITATION

## 2024-08-03 PROCEDURE — A9270 NON-COVERED ITEM OR SERVICE: HCPCS | Performed by: PHYSICAL MEDICINE & REHABILITATION

## 2024-08-03 PROCEDURE — 700101 HCHG RX REV CODE 250: Performed by: PHYSICAL MEDICINE & REHABILITATION

## 2024-08-03 PROCEDURE — 770010 HCHG ROOM/CARE - REHAB SEMI PRIVAT*

## 2024-08-03 RX ADMIN — OXYCODONE HYDROCHLORIDE 5 MG: 5 TABLET ORAL at 12:35

## 2024-08-03 RX ADMIN — OMEPRAZOLE 20 MG: 20 CAPSULE, DELAYED RELEASE ORAL at 08:34

## 2024-08-03 RX ADMIN — OXYCODONE HYDROCHLORIDE 5 MG: 5 TABLET ORAL at 05:15

## 2024-08-03 RX ADMIN — ACETAMINOPHEN 1000 MG: 500 TABLET, FILM COATED ORAL at 08:34

## 2024-08-03 RX ADMIN — MIDODRINE HYDROCHLORIDE 5 MG: 2.5 TABLET ORAL at 08:34

## 2024-08-03 RX ADMIN — ACETAMINOPHEN 1000 MG: 500 TABLET, FILM COATED ORAL at 14:45

## 2024-08-03 RX ADMIN — MIDODRINE HYDROCHLORIDE 5 MG: 2.5 TABLET ORAL at 17:43

## 2024-08-03 RX ADMIN — LIDOCAINE 1 PATCH: 4 PATCH TOPICAL at 17:40

## 2024-08-03 RX ADMIN — MIDODRINE HYDROCHLORIDE 5 MG: 2.5 TABLET ORAL at 12:32

## 2024-08-03 RX ADMIN — ACETAMINOPHEN 1000 MG: 500 TABLET, FILM COATED ORAL at 20:08

## 2024-08-03 RX ADMIN — ENOXAPARIN SODIUM 40 MG: 100 INJECTION SUBCUTANEOUS at 17:42

## 2024-08-03 ASSESSMENT — PAIN DESCRIPTION - PAIN TYPE: TYPE: ACUTE PAIN

## 2024-08-03 NOTE — CARE PLAN
"The patient is Stable - Low risk of patient condition declining or worsening    Shift Goals  Clinical Goals: safety  Patient Goals: sleep well, pain control  Family Goals: no one present    Problem: Skin Integrity  Goal: Skin integrity is maintained or improved  Outcome: Progressing  Note:   Sascha Score: 17    Patient's skin remains intact and free from new or accidental injury this shift; no s/s of infection. RN wound protocol checked. Encouraged hydration and educated about the importance of nutrition to keep skin integrity. Will continue to monitor.      Problem: Fall Risk - Rehab  Goal: Patient will remain free from falls  Outcome: Progressing  Note: Fifi Evangelista Fall risk Assessment Score: 15    High fall risk Interventions   - Bed and strip alarm   - Yellow sign by the door   - Yellow wrist band \"Fall risk\"  - Room near to the nurse station  - Do not leave patient unattended in the bathroom  - Fall risk education provided     "

## 2024-08-03 NOTE — PROGRESS NOTES
NURSING DAILY NOTE    Name: Gali Broderick   Date of Admission: 7/23/2024   Admitting Diagnosis: Multiple trauma  Attending Physician: Elias Welch D.o.  Allergies: Iodine    Safety  Patient Assist  CGA  Patient Precautions  Weight Bearing As Tolerated Left Lower Extremity, Non Weight Bearing Right Upper Extremity  Precaution Comments  Orthostatic hypotension  Bed Transfer Status  Standby Assist  Toilet Transfer Status   Contact Guard Assist  Assistive Devices  Wheelchair, Rails  Oxygen  None - Room Air  Diet/Therapeutic Dining  Current Diet Order   Procedures    Diet Order Diet: Regular     Pill Administration  whole  Agitated Behavioral Scale     ABS Level of Severity       Fall Risk  Has the patient had a fall this admission?   No  Fifi Evangelista Fall Risk Scoring  15, HIGH RISK  Fall Risk Safety Measures  bed alarm and chair alarm    Vitals  Temperature: 36.6 °C (97.8 °F)  Temp src: Oral  Pulse: 100  Respiration: 14  Blood Pressure : 124/55  Blood Pressure MAP (Calculated): 78 MM HG  BP Location: Left, Upper Arm  Patient BP Position: Supine     Oxygen  Pulse Oximetry: 92 %  O2 (LPM): 0  O2 Delivery Device: None - Room Air    Bowel and Bladder  Last Bowel Movement  08/01/24 (PER PATIENT)  Stool Type  Type 4: Like a sausage or snake, smooth and soft  Bowel Device  Bathroom  Continent  Bladder: Did not void   Bowel: No movement  Bladder Function  Urine Void (mL):  (Large)  Number of Times Voided: 1  Urine Color: Unable To Evaluate  Genitourinary Assessment   Bladder Assessment (WDL):  Within Defined Limits  Ariza Catheter: Not Applicable  Urine Color: Unable To Evaluate  Bladder Device: Bathroom  Bladder Scan: Post Void  $ Bladder Scan Results (mL): 6    Skin  Sascha Score   17  Sensory Interventions   Bed Types: Standard/Trauma Mattress  Skin Preventative Measures: Waffle Overlay  Moisture Interventions  Moisturizers/Barriers: Barrier  Paste      Pain  Pain Rating Scale  4 - Distracts me, can do usual activities  Pain Location  Arm, Knee  Pain Location Orientation  Right, Left  Pain Interventions   Medication (see MAR)    ADLs    Bathing    (shower with ot)  Linen Change      Personal Hygiene  Moist Estefany Wipes, Perineal Care  Chlorhexidine Bath      Oral Care  Brushed Teeth  Teeth/Dentures     Shave     Nutrition Percentage Eaten  Between 50-75% Consumed  Environmental Precautions  Treaded Slipper Socks on Patient  Patient Turns/Positioning  Patient Turns Self from Side to Side  Patient Turns Assistance/Tolerance     Bed Positions  Bed Controls On, Bed Locked  Head of Bed Elevated  Self regulated      Psychosocial/Neurologic Assessment  Psychosocial Assessment  Psychosocial (WDL):  Within Defined Limits  Neurologic Assessment  Neuro (WDL): Within Defined Limits  Level of Consciousness: Alert  Orientation Level: Oriented X4  Cognition: Follows commands, Appropriate attention/concentration  Speech: Clear  Motor Function/Sensation Assessment: Sensation  RUE Sensation: Tingling, Pain  LLE Sensation: Pain, Tingling  EENT (WDL):  WDL Except    Cardio/Pulmonary Assessment  Edema   RUE Edema: Generalized  RLE Edema: 1+  LLE Edema: Generalized  Respiratory Breath Sounds  RUL Breath Sounds: Clear  RML Breath Sounds: Clear  RLL Breath Sounds: Clear  BERTA Breath Sounds: Clear  LLL Breath Sounds: Clear  Cardiac Assessment   Cardiac (WDL):  (orthostatics)

## 2024-08-04 ENCOUNTER — APPOINTMENT (OUTPATIENT)
Dept: PHYSICAL THERAPY | Facility: REHABILITATION | Age: 79
DRG: 560 | End: 2024-08-04
Attending: PHYSICAL MEDICINE & REHABILITATION
Payer: COMMERCIAL

## 2024-08-04 PROCEDURE — 770010 HCHG ROOM/CARE - REHAB SEMI PRIVAT*

## 2024-08-04 PROCEDURE — 700111 HCHG RX REV CODE 636 W/ 250 OVERRIDE (IP): Mod: JZ | Performed by: PHYSICAL MEDICINE & REHABILITATION

## 2024-08-04 PROCEDURE — 700102 HCHG RX REV CODE 250 W/ 637 OVERRIDE(OP): Performed by: PHYSICAL MEDICINE & REHABILITATION

## 2024-08-04 PROCEDURE — 97110 THERAPEUTIC EXERCISES: CPT | Mod: CQ

## 2024-08-04 PROCEDURE — 700101 HCHG RX REV CODE 250: Performed by: PHYSICAL MEDICINE & REHABILITATION

## 2024-08-04 PROCEDURE — 97530 THERAPEUTIC ACTIVITIES: CPT | Mod: CQ

## 2024-08-04 PROCEDURE — 94760 N-INVAS EAR/PLS OXIMETRY 1: CPT

## 2024-08-04 PROCEDURE — A9270 NON-COVERED ITEM OR SERVICE: HCPCS | Performed by: PHYSICAL MEDICINE & REHABILITATION

## 2024-08-04 PROCEDURE — 97116 GAIT TRAINING THERAPY: CPT | Mod: CQ

## 2024-08-04 RX ADMIN — ACETAMINOPHEN 1000 MG: 500 TABLET, FILM COATED ORAL at 21:42

## 2024-08-04 RX ADMIN — OXYCODONE 2.5 MG: 5 TABLET ORAL at 08:15

## 2024-08-04 RX ADMIN — OMEPRAZOLE 20 MG: 20 CAPSULE, DELAYED RELEASE ORAL at 08:15

## 2024-08-04 RX ADMIN — OXYCODONE HYDROCHLORIDE 5 MG: 5 TABLET ORAL at 12:46

## 2024-08-04 RX ADMIN — ACETAMINOPHEN 1000 MG: 500 TABLET, FILM COATED ORAL at 08:15

## 2024-08-04 RX ADMIN — OXYCODONE HYDROCHLORIDE 5 MG: 5 TABLET ORAL at 05:16

## 2024-08-04 RX ADMIN — MIDODRINE HYDROCHLORIDE 5 MG: 2.5 TABLET ORAL at 12:45

## 2024-08-04 RX ADMIN — ENOXAPARIN SODIUM 40 MG: 100 INJECTION SUBCUTANEOUS at 17:43

## 2024-08-04 RX ADMIN — MIDODRINE HYDROCHLORIDE 5 MG: 2.5 TABLET ORAL at 08:16

## 2024-08-04 RX ADMIN — OXYCODONE HYDROCHLORIDE 5 MG: 5 TABLET ORAL at 21:42

## 2024-08-04 RX ADMIN — LIDOCAINE 1 PATCH: 4 PATCH TOPICAL at 17:44

## 2024-08-04 RX ADMIN — ACETAMINOPHEN 1000 MG: 500 TABLET, FILM COATED ORAL at 16:06

## 2024-08-04 ASSESSMENT — GAIT ASSESSMENTS
DISTANCE (FEET): 12
DEVIATION: STEP TO;ANTALGIC;DECREASED BASE OF SUPPORT
ASSISTIVE DEVICE: HEMI-WALKER
GAIT LEVEL OF ASSIST: CONTACT GUARD ASSIST

## 2024-08-04 ASSESSMENT — PAIN DESCRIPTION - PAIN TYPE
TYPE: ACUTE PAIN

## 2024-08-04 ASSESSMENT — ACTIVITIES OF DAILY LIVING (ADL): BED_CHAIR_WHEELCHAIR_TRANSFER_DESCRIPTION: SET-UP OF EQUIPMENT

## 2024-08-04 NOTE — PROGRESS NOTES
NURSING DAILY NOTE    Name: Gali Broderick   Date of Admission: 7/23/2024   Admitting Diagnosis: Multiple trauma  Attending Physician: Elias Welch D.o.  Allergies: Iodine    Safety  Patient Assist  CGA  Patient Precautions  Weight Bearing As Tolerated Left Lower Extremity, Non Weight Bearing Right Upper Extremity  Precaution Comments  Orthostatic hypotension  Bed Transfer Status  Standby Assist  Toilet Transfer Status   Contact Guard Assist  Assistive Devices  Wheelchair, Rails  Oxygen  None - Room Air  Diet/Therapeutic Dining  Current Diet Order   Procedures    Diet Order Diet: Regular     Pill Administration  whole  Agitated Behavioral Scale     ABS Level of Severity       Fall Risk  Has the patient had a fall this admission?   No  Fifi Evangelista Fall Risk Scoring  15, HIGH RISK  Fall Risk Safety Measures  bed alarm, chair alarm, and poor balance    Vitals  Temperature: 36.7 °C (98 °F)  Temp src: Oral  Pulse: 85  Respiration: 18  Blood Pressure : 130/56  Blood Pressure MAP (Calculated): 81 MM HG  BP Location: Left, Upper Arm  Patient BP Position: Supine     Oxygen  Pulse Oximetry: 95 %  O2 (LPM): 0  O2 Delivery Device: None - Room Air    Bowel and Bladder  Last Bowel Movement  08/01/24 (PER PATIENT)  Stool Type  Type 4: Like a sausage or snake, smooth and soft  Bowel Device  Bathroom  Continent  Bladder: Did not void   Bowel: No movement  Bladder Function  Urine Void (mL):  (Large)  Number of Times Voided: 1  Urine Color: Unable To Evaluate  Genitourinary Assessment   Bladder Assessment (WDL):  Within Defined Limits  Ariza Catheter: Not Applicable  Urine Color: Unable To Evaluate  Bladder Device: Bathroom  Bladder Scan: Post Void  $ Bladder Scan Results (mL): 6    Skin  Sascha Score   17  Sensory Interventions   Bed Types: Standard/Trauma Mattress  Skin Preventative Measures: Waffle Overlay  Moisture Interventions  Moisturizers/Barriers: Barrier  Paste      Pain  Pain Rating Scale  3 - Sometimes distracts me  Pain Location  Shoulder  Pain Location Orientation  Right, Left  Pain Interventions   Medication (see MAR)    ADLs    Bathing    (shower with ot)  Linen Change      Personal Hygiene  Moist Estefany Wipes, Perineal Care  Chlorhexidine Bath      Oral Care  Brushed Teeth  Teeth/Dentures     Shave     Nutrition Percentage Eaten  Between 50-75% Consumed  Environmental Precautions  Treaded Slipper Socks on Patient  Patient Turns/Positioning  Patient Turns Self from Side to Side  Patient Turns Assistance/Tolerance     Bed Positions  Bed Controls On, Bed Locked  Head of Bed Elevated  Self regulated      Psychosocial/Neurologic Assessment  Psychosocial Assessment  Psychosocial (WDL):  Within Defined Limits  Neurologic Assessment  Neuro (WDL): Within Defined Limits  Level of Consciousness: Alert  Orientation Level: Oriented X4  Cognition: Follows commands, Appropriate attention/concentration  Speech: Clear  Motor Function/Sensation Assessment: Sensation  RUE Sensation: Tingling, Pain  LLE Sensation: Pain, Tingling  EENT (WDL):  WDL Except    Cardio/Pulmonary Assessment  Edema   RUE Edema: Generalized  RLE Edema: 1+  LLE Edema: Generalized  Respiratory Breath Sounds  RUL Breath Sounds: Clear  RML Breath Sounds: Clear  RLL Breath Sounds: Clear  BERTA Breath Sounds: Clear  LLL Breath Sounds: Clear  Cardiac Assessment   Cardiac (WDL):  (orthostatics)

## 2024-08-04 NOTE — THERAPY
Occupational Therapy  Daily Treatment     Patient Name: Gali Broderick  Age:  79 y.o., Sex:  female  Medical Record #: [id number]  Today's Date: 8/3/2024     Precautions  Precautions: Weight Bearing As Tolerated Left Lower Extremity, Non Weight Bearing Right Upper Extremity  Comments: Orthostatic hypotension         Subjective    Pt willing to work with OT for both sessions     Objective       08/02/24 1001 08/02/24 1501   OT Charge Group   OT Therapy Activity (Units)  --  3   OT Therapeutic Exercise (Units) 3  --    OT Total Time Spent   OT Individual Total Time Spent (Mins) 45 45   Functional Level of Assist   Bed, Chair, Wheelchair Transfer Standby Assist  --    Bed Chair Wheelchair Transfer Description Increased time;Initial preparation for task;Supervision for safety  --    Tub / Shower Transfers  --    (Dry shower transfer to ADL room walk in shower, using tub transfer bench. See comments for info)   Sitting Upper Body Exercises   Chest Press 3 sets of 10;Left  (Blue hard therapy band. Attempted on equalizer but pt unable to do with the positioning and weight)  --    Lat Pull 3 sets of 10;Left  (equalizer 15lb)  --    Bilateral Row 3 sets of 10;Left  (UNILATERAL; Equalizer 15lb; 20lb; 20lb)  --    Comments rest breaks in between sets attempted 1lb wrist curls with R wrist resting on table; however this was painful for the pt   Hand Strengthening   Comment  --  green (medium) therapy putty for hand strengthening on L hand   Interdisciplinary Plan of Care Collaboration   IDT Collaboration with   --  Family / Caregiver   Patient Position at End of Therapy  --  In Bed   Collaboration Comments  --  pt's  present for session     1501 session comments: Significant time was spent discussion tub transfer bench for pt's walk in shower at home. Pt and  present for training on TTB and both verbalized understanding of why this is a better option than a shower chair (safer, more stable, and does not require pt  to step over threshold to walk in shower). Pt's  will investigate getting a TTB for DC home.    Provided pt with therapy band exercises that she can do with assistance from her room and at home to strengthen LUE in order to facilitate independence with transfers and functional mobility.    Assessment    Pt seen for OT tx sessions twice this day. Able to tolerate both sessions fair. Able to demonstrate safe shower transfer with tub transfer bench.   Strengths: Able to follow instructions, Alert and oriented, Effective communication skills, Independent prior level of function, Manages pain appropriately, Motivated for self care and independence, Pleasant and cooperative, Supportive family, Willingly participates in therapeutic activities  Barriers: Decreased endurance, Generalized weakness, Impaired balance, Limited mobility    Plan    [place] and safety with ADLs/IADLs as tolerated, UE strengthening, endurance, pain management, equipment training    DME  OT DME Recommendations  Bathroom Equipment: TUB TRANSFER BENCH    Passport items to be completed:  Perform bathroom transfers, complete dressing, complete feeding, get ready for the day, prepare a simple meal, participate in household tasks, adapt home for safety needs, demonstrate home exercise program, complete caregiver training     Occupational Therapy Goals (Active)       Problem: Dressing       Dates: Start:  07/25/24         Goal: STG-Within one week, patient will dress LB with min assist using LRAD as needed       Dates: Start:  07/25/24    Expected End:  08/07/24               Problem: Functional Transfers       Dates: Start:  07/25/24         Goal: STG-Within one week, patient will transfer to toilet at SPV level using LRAD as needed       Dates: Start:  07/25/24    Expected End:  08/07/24               Problem: OT Long Term Goals       Dates: Start:  07/25/24         Goal: LTG-By discharge, patient will complete basic self care tasks at mod I  level using LRAD as needed       Dates: Start:  07/25/24    Expected End:  08/07/24            Goal: LTG-By discharge, patient will perform bathroom transfers at mod I level using LRAD as needed       Dates: Start:  07/25/24    Expected End:  08/07/24
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
## 2024-08-04 NOTE — THERAPY
Physical Therapy   Daily Treatment     Patient Name: Gali Broderick  Age:  79 y.o., Sex:  female  Medical Record #: [id number]  Today's Date: 8/4/2024     Precautions  Precautions: Weight Bearing As Tolerated Left Lower Extremity, Non Weight Bearing Right Upper Extremity  Comments: Orthostatic hypotension    Subjective    Pt awake upon arrival, agreeable to PT session     Objective       08/04/24 0701   PT Charge Group   PT Gait Training (Units) 1   PT Therapeutic Exercise (Units) 1   PT Therapeutic Activities (Units) 2   Supervising Physical Therapist Munira Black   PT Total Time Spent   PT Individual Total Time Spent (Mins) 60   Pain   Intervention Other (Comments);Education  (ice massage)   Pain 0 - 10 Group   Location Knee   Location Orientation Left   Description Aching;Sore   Comfort Goal Comfort with Movement;Perform Activity   Therapist Pain Assessment Prior to Activity   Gait Functional Level of Assist    Gait Level Of Assist Contact Guard Assist   Assistive Device Jelani-Walker   Distance (Feet) 12   # of Times Distance was Traveled 1   Deviation Step To;Antalgic;Decreased Base Of Support  (3-point pattern)   Wheelchair Functional Level of Assist   Wheelchair Assist Supervised   Distance Wheelchair (Feet or Distance) 120   Wheelchair Description Extra time;Limited by fatigue;Supervision for safety  (B LE and L UE)   Transfer Functional Level of Assist   Bed, Chair, Wheelchair Transfer Standby Assist   Bed Chair Wheelchair Transfer Description Set-up of equipment  (SPT)   Supine Lower Body Exercise   Gluteal Isometrics 1 set of 10;Bilateral   Quadriceps Isometrics 1 set of 10;Left   Bed Mobility    Supine to Sit Supervised  (to Jayson)   Sit to Supine Supervised   Sit to Stand Standby Assist   Scooting Supervised   Neuro-Muscular Treatments   Neuro-Muscular Treatments Sequencing   Comments gait training LE sequencing with HW, transfer with    Interdisciplinary Plan of Care Collaboration   Patient Position at End  of Therapy Seated;Chair Alarm On;Call Light within Reach;Self Releasing Lap Belt Applied   Walk 10 Feet   Assistance Needed Adaptive equipment;Incidental touching   CARE Score - Walk 10 Feet 4     Discussed dc plan and upcoming dc    Pt required set up assist for LB dressing and Ramses for UB dressing including doffing/donning sling and shirt. Completed at  level    Edu pt on benefits and sensations expected for ice massage  Ice massage x 5 minutes to medial aspect of L knee    Assessment    Pt completed short distance amb with HW and CGA, requires cues for walker management and LE sequencing. Pt completed the gait training after the ice massage and reports the pain moved from the medial aspect of the knee to the patellar area. Pt receptive to al education   Strengths: Able to follow instructions, Alert and oriented, Effective communication skills, Independent prior level of function, Motivated for self care and independence, Pleasant and cooperative, Willingly participates in therapeutic activities, Supportive family  Barriers: Decreased endurance, Fatigue, Generalized weakness, Impaired activity tolerance, Impaired balance, Limited mobility, Pain, Impulsive    Plan    L UE strengthening (triceps)  Progressing to gait with jelani walker  Strengthening in all planes  Balance  Transfer safety  Car transfers and stairs as able    DME  PT DME Recommendations  Assistive Device: Jelani Walker    Passport items to be completed:  Get in/out of bed safely, in/out of a vehicle, safely use mobility device, walk or wheel around home/community, navigate up and down stairs, show how to get up/down from the ground, ensure home is accessible, demonstrate HEP, complete caregiver training    Physical Therapy Problems (Active)       Problem: Balance       Dates: Start:  07/24/24         Goal: STG-Within one week, patient will tolerate caputo balance test.       Dates: Start:  07/24/24    Expected End:  08/07/24               Problem:  Mobility       Dates: Start:  07/24/24         Goal: STG-Within one week, patient will ambulate 10 ft using hemiwalker with contact guard assist.        Dates: Start:  07/24/24    Expected End:  08/07/24            Goal: STG-Within one week, patient will ambulate up/down a curb using hemiwalker and contact guard assist.        Dates: Start:  07/24/24    Expected End:  08/07/24               Problem: Mobility Transfers       Dates: Start:  07/24/24         Goal: STG-Within one week, patient will transfer bed to chair with stand pivot transfer safely with supervision assist.        Dates: Start:  07/24/24    Expected End:  08/07/24               Problem: PT-Long Term Goals       Dates: Start:  07/24/24         Goal: LTG-By discharge, patient will receive >35/56 on caputo balance test.        Dates: Start:  07/24/24    Expected End:  08/07/24            Goal: LTG-By discharge, patient will ambulate 100 ft using hemiwalker or LRAD with supervision assist.        Dates: Start:  07/24/24    Expected End:  08/07/24            Goal: LTG-By discharge, patient will perform home exercise program using handouts independently.        Dates: Start:  07/24/24    Expected End:  08/07/24            Goal: LTG-By discharge, patient will transfer in/out of a car using hemiwalker with supervision assist.        Dates: Start:  07/24/24    Expected End:  08/07/24

## 2024-08-04 NOTE — CARE PLAN
"The patient is Stable - Low risk of patient condition declining or worsening    Shift Goals  Clinical Goals: Keep pain low so that pt can work with therapies  Patient Goals: pain mgmt  Family Goals: Support, POC updates    Progress made toward(s) clinical / shift goals:  Pt uses both scheduled and prn meds to help control pain this shift. She continues to rate pain less than 5 and is able to \"walk with PT further than she has before\"    Patient is not progressing towards the following goals:      "
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
## 2024-08-04 NOTE — CARE PLAN
"The patient is Stable - Low risk of patient condition declining or worsening    Shift Goals  Clinical Goals: safety  Patient Goals: sleep well, pain control  Family Goals: no one present    Problem: Skin Integrity  Goal: Skin integrity is maintained or improved  Outcome: Progressing  Note:   Sascha Score: 17    Patient's skin remains intact and free from new or accidental injury this shift; no s/s of infection. RN wound protocol checked. Patient is on waffle on overlay on mattress. Encouraged hydration and educated about the importance of nutrition to keep skin integrity. Will continue to monitor.      Problem: Fall Risk - Rehab  Goal: Patient will remain free from falls  Outcome: Progressing  Note: Fifi Evangelista Fall risk Assessment Score: 15    High fall risk Interventions   - Bed and strip alarm   - Yellow sign by the door   - Yellow wrist band \"Fall risk\"  - Room near to the nurse station  - Do not leave patient unattended in the bathroom  - Fall risk education provided     "

## 2024-08-05 ENCOUNTER — APPOINTMENT (OUTPATIENT)
Dept: OCCUPATIONAL THERAPY | Facility: REHABILITATION | Age: 79
DRG: 560 | End: 2024-08-05
Attending: PHYSICAL MEDICINE & REHABILITATION
Payer: COMMERCIAL

## 2024-08-05 ENCOUNTER — APPOINTMENT (OUTPATIENT)
Dept: PHYSICAL THERAPY | Facility: REHABILITATION | Age: 79
DRG: 560 | End: 2024-08-05
Attending: PHYSICAL MEDICINE & REHABILITATION
Payer: COMMERCIAL

## 2024-08-05 PROCEDURE — 97535 SELF CARE MNGMENT TRAINING: CPT

## 2024-08-05 PROCEDURE — A9270 NON-COVERED ITEM OR SERVICE: HCPCS | Performed by: PHYSICAL MEDICINE & REHABILITATION

## 2024-08-05 PROCEDURE — 97602 WOUND(S) CARE NON-SELECTIVE: CPT

## 2024-08-05 PROCEDURE — 97110 THERAPEUTIC EXERCISES: CPT

## 2024-08-05 PROCEDURE — 700111 HCHG RX REV CODE 636 W/ 250 OVERRIDE (IP): Mod: JZ | Performed by: PHYSICAL MEDICINE & REHABILITATION

## 2024-08-05 PROCEDURE — 700102 HCHG RX REV CODE 250 W/ 637 OVERRIDE(OP): Performed by: PHYSICAL MEDICINE & REHABILITATION

## 2024-08-05 PROCEDURE — 97530 THERAPEUTIC ACTIVITIES: CPT

## 2024-08-05 PROCEDURE — 97116 GAIT TRAINING THERAPY: CPT

## 2024-08-05 PROCEDURE — 770010 HCHG ROOM/CARE - REHAB SEMI PRIVAT*

## 2024-08-05 PROCEDURE — 99232 SBSQ HOSP IP/OBS MODERATE 35: CPT | Performed by: PHYSICAL MEDICINE & REHABILITATION

## 2024-08-05 PROCEDURE — 700101 HCHG RX REV CODE 250: Performed by: PHYSICAL MEDICINE & REHABILITATION

## 2024-08-05 RX ADMIN — OMEPRAZOLE 20 MG: 20 CAPSULE, DELAYED RELEASE ORAL at 07:57

## 2024-08-05 RX ADMIN — MIDODRINE HYDROCHLORIDE 5 MG: 2.5 TABLET ORAL at 11:42

## 2024-08-05 RX ADMIN — ACETAMINOPHEN 1000 MG: 500 TABLET, FILM COATED ORAL at 07:57

## 2024-08-05 RX ADMIN — MIDODRINE HYDROCHLORIDE 5 MG: 2.5 TABLET ORAL at 07:57

## 2024-08-05 RX ADMIN — MIDODRINE HYDROCHLORIDE 5 MG: 2.5 TABLET ORAL at 17:20

## 2024-08-05 RX ADMIN — ACETAMINOPHEN 1000 MG: 500 TABLET, FILM COATED ORAL at 20:27

## 2024-08-05 RX ADMIN — OXYCODONE HYDROCHLORIDE 5 MG: 5 TABLET ORAL at 06:23

## 2024-08-05 RX ADMIN — ACETAMINOPHEN 1000 MG: 500 TABLET, FILM COATED ORAL at 14:30

## 2024-08-05 RX ADMIN — LIDOCAINE 1 PATCH: 4 PATCH TOPICAL at 17:21

## 2024-08-05 RX ADMIN — ENOXAPARIN SODIUM 40 MG: 100 INJECTION SUBCUTANEOUS at 17:20

## 2024-08-05 RX ADMIN — OXYCODONE HYDROCHLORIDE 5 MG: 5 TABLET ORAL at 13:18

## 2024-08-05 ASSESSMENT — ACTIVITIES OF DAILY LIVING (ADL)
BED_CHAIR_WHEELCHAIR_TRANSFER_DESCRIPTION: SET-UP OF EQUIPMENT;INCREASED TIME;INITIAL PREPARATION FOR TASK
TOILET_TRANSFER_DESCRIPTION: GRAB BAR;SET-UP OF EQUIPMENT;SUPERVISION FOR SAFETY
TOILETING_LEVEL_OF_ASSIST_DESCRIPTION: GRAB BAR;SUPERVISION FOR SAFETY
BED_CHAIR_WHEELCHAIR_TRANSFER_DESCRIPTION: SET-UP OF EQUIPMENT
BED_CHAIR_WHEELCHAIR_TRANSFER_DESCRIPTION: SET-UP OF EQUIPMENT

## 2024-08-05 ASSESSMENT — PAIN DESCRIPTION - PAIN TYPE
TYPE: ACUTE PAIN
TYPE: ACUTE PAIN

## 2024-08-05 ASSESSMENT — GAIT ASSESSMENTS
ASSISTIVE DEVICE: HEMI-WALKER
GAIT LEVEL OF ASSIST: CONTACT GUARD ASSIST
DISTANCE (FEET): 12
DEVIATION: STEP TO;ANTALGIC;DECREASED BASE OF SUPPORT
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
## 2024-08-05 NOTE — DISCHARGE PLANNING
Case Management / Initial authorization:    Auth #  [id number]   Days authorized: 7/23-8/6    Clinical concurrent review faxed : 8/2/24  Name: [employer]  Phone: [phone] ext [phone]   Fax:[phone]     Outcome:authed until 8/6/24.

## 2024-08-05 NOTE — PROGRESS NOTES
"  Physical Medicine & Rehabilitation Progress Note    Encounter Date: 8/5/2024    Chief Complaint: Left leg pain    Interval Events (Subjective):    Patient was evaluated while working with physical therapy, reports significant improvement in left knee pain with ice and lidocaine patch.  This allowed her to participate more with therapy.  Physical therapy has not been working on stretching of the medial hamstrings as of yet.  She does note improvement in swelling in the left lower extremity, still having significant swelling in the left foot.      ROS:  Gen: No fatigue, confusion, significant weight loss  Eyes: no blurry vision, double vision or pain in eyes  ENT: no changes in hearing, runny nose, nose bleeds, sinus pain  CV: No CP, palpitations or  Lungs: no shortness of breath, changes in secretions, changes in cough, pain with coughing  Abd: No abd pain, nausea, vomiting, pain with eating  : no blood in urine, pain during storage phase, bladder spasms, suprapubic pain  Ext: +swelling in left leg, no Asymmetric atrophy  Neuro: no changes in strength or sensation  Skin: no new ulcers/skin breakdown appreciated by patient  Mood: No changes in mood today, increase in depression or anxiety  Heme: no bruising, or bleeding    Objective:  Vitals: /49   Pulse 80   Temp 36.6 °C (97.8 °F) (Oral)   Resp 18   Ht 1.626 m (5' 4\")   Wt 76.5 kg (168 lb 10.4 oz)   SpO2 94%   Gen: NAD, Body mass index is 28.95 kg/m².  HEENT:  NC/AT, PERRLA, moist mucous membranes  Cardio: RRR, no mumurs  Pulm: CTAB, with normal respiratory effort  Abd: Soft NTND, active bowel sounds,   Ext: Improving left lower extremity edema. No calf tenderness. No clubbing/cyanosis. +dorsal pedalis pulses bilaterally.  MSK: Improved swelling over the Pez anserine bursa, improved tenderness to palpation over this area    Laboratory Values:  No results found for this or any previous visit (from the past 72 hour(s)).    Medications:  Scheduled " Medications   Medication Dose Frequency    lidocaine  1 Patch DAILY    oxyCODONE immediate-release  5 mg BID    midodrine  5 mg TID WITH MEALS    acetaminophen  1,000 mg TID    Pharmacy Consult Request  1 Each PHARMACY TO DOSE    senna-docusate  2 Tablet BID    omeprazole  20 mg DAILY    enoxaparin (LOVENOX) injection  40 mg DAILY AT 1800     PRN medications: Respiratory Therapy Consult, hydrALAZINE, senna-docusate **AND** polyethylene glycol/lytes, ondansetron **OR** ondansetron, sodium chloride, oxyCODONE immediate-release **OR** oxyCODONE immediate-release    Diet:  Current Diet Order   Procedures    Diet Order Diet: Regular       Assessment:  Active Hospital Problems    Diagnosis     *Multiple trauma     Orthostatic dizziness     Closed fracture of right shoulder     Closed fracture of proximal end of left tibia, unspecified fracture morphology, initial encounter     Aortic root dilatation (HCC)     Essential (primary) hypertension        Medical Decision Making and Plan:    Fracture of right (dominant side) humerus   - 3 part proximal humerus fracture   - s/p closed manipulation and treatment by Dr. Ramírez Denney MD on 7/16   - NWB RUE   - Remove staples on 8/2  -Continue comprehensive acute inpatient rehabilitation  -patient does not qualify for home health due to insurance. To continue therapy she needs to do one step and get into her car. Extend inpatient stay to accomplish these goals     Fracture of left tibia   - Comminuted proximal tibia fracture   - S/p Open reduction internal fixation left tibial shaft with intramedullary nail by Dr. Ramírez Denney MD on 7/16   - WBAT LLE   - PT/OT     Left lower extremity swelling: Negative lower extremity  ultrasound on 8/2  -Continue mechanical compression with JACKIE hose     Acute blood loss anemia   - Hgb 9.6 on 7/21  -7/30- Hb 10.1  -8/5-check CBC in a.m.     Orthostatic Hypotension   - Secondary to acute blood loss anemia and meds  - JACKIE hose   - Abdominal  binder  -7/26- DC telmisartam  -7/30- start midodrine 5mg TID  -continue midodrine PRN at discharge  -check BP at home 2 times per day. If BP above 120 and not symptomatic patient can stop midodrine. If BP above 140 consistantly, call PCP to restart telmisartam     Hypertension: -140 in last 24 hours, overall worsening  - was on Telmisartan 40mg Q evening   -DC telmisartan due to low bp  -Continue to hold telmisartan, at risk for orthostatic hypotension as above   - If BP above 140 consistantly, call PCP to restart telmisartam     Pain:  - Neuroceptic - On Tylenol prn, continue oxycodone prn     #Left Pes anserine bursitis:  -Localized pain over the left Pez anserine bursa  - Apply ice 3-4 times per day  -Discussed with patient and therapy, initiation of stretching of medial hamstring  - lidocaine patch over the area on for 12 hours off for 12 hours     DVT prophylaxis:  continue lovenox     GI prophylaxis:  On Prilosec 20mg daily         -Follow-up Ortho, PCP  ____________________________________    Elias Welch DO  [facility] - Physical Medicine & Rehabilitation   [facility] - Spinal Cord Injury Medicine  ____________________________________    Total time:  36 minutes.  I spent greater than 50% of the time for patient care and coordination on this date, including unit/floor time, and face-to-face time with the patient as per assessment and plan above.  Discussion included hypertension continue to hold telmisartan, blood pressure overall increasing, acute blood loss anemia, check CBC in a.m. Pes anserine bursitis, change timing of lidocaine patches, ice, stretching of medial hamstring

## 2024-08-05 NOTE — THERAPY
"Occupational Therapy  Daily Treatment     Patient Name: Gali Broderick  Age:  79 y.o., Sex:  female  Medical Record #: [id number]  Today's Date: 8/5/2024     Precautions  Precautions: Weight Bearing As Tolerated Left Lower Extremity, Non Weight Bearing Right Upper Extremity  Comments: Orthostatic hypotension         Subjective    \"I will be able to stand [in the shower] when I get home\"     Objective       08/05/24 0901   OT Charge Group   OT Self Care / ADL (Units) 2   OT Therapeutic Exercise (Units) 2   OT Total Time Spent   OT Individual Total Time Spent (Mins) 60   Functional Level of Assist   Grooming Modified Independent   Bathing Supervision   Bathing Description Grab bar;Hand held shower;Tub bench;Set-up of equipment   Upper Body Dressing Minimal Assist  (still at min assist level but needing less assist for UB dressing and donning/doffing sling than previously needed)   Toileting Standby Assist   Toileting Description Grab bar;Supervision for safety   Toilet Transfers Standby Assist   Toilet Transfer Description Grab bar;Set-up of equipment;Supervision for safety   Sitting Upper Body Exercises   Shoulder Press 2 sets of 10;Left  (5lb)   Bicep Curls 2 sets of 10;Left  (5lb)   Upper Extremity Bike Level 4 Resistance  (motomed 10 minutes)         Assessment    Pt seen for OT tx session. SPV level with showering due to concerns for safety. Pt will likely be able to complete this at home with her  nearby. Pt able to tolerate shower and exercises well.   Strengths: Able to follow instructions, Alert and oriented, Effective communication skills, Independent prior level of function, Manages pain appropriately, Motivated for self care and independence, Pleasant and cooperative, Supportive family, Willingly participates in therapeutic activities  Barriers: Decreased endurance, Generalized weakness, Impaired balance, Limited mobility    Plan    DC IRFPAIs tomorrow    DME  OT DME Recommendations  Bathroom " Equipment:  (TUB TRANSFER BENCH)    Passport items to be completed:  Perform bathroom transfers, complete dressing, complete feeding, get ready for the day, prepare a simple meal, participate in household tasks, adapt home for safety needs, demonstrate home exercise program, complete caregiver training     Occupational Therapy Goals (Active)       Problem: Dressing       Dates: Start:  07/25/24         Goal: STG-Within one week, patient will dress LB with min assist using LRAD as needed       Dates: Start:  07/25/24    Expected End:  08/07/24               Problem: Functional Transfers       Dates: Start:  07/25/24         Goal: STG-Within one week, patient will transfer to toilet at SPV level using LRAD as needed       Dates: Start:  07/25/24    Expected End:  08/07/24               Problem: OT Long Term Goals       Dates: Start:  07/25/24         Goal: LTG-By discharge, patient will complete basic self care tasks at mod I level using LRAD as needed       Dates: Start:  07/25/24    Expected End:  08/07/24            Goal: LTG-By discharge, patient will perform bathroom transfers at mod I level using LRAD as needed       Dates: Start:  07/25/24    Expected End:  08/07/24

## 2024-08-05 NOTE — THERAPY
Occupational Therapy  Daily Treatment     Patient Name: Gali Broderick  Age:  79 y.o., Sex:  female  Medical Record #: [id number]  Today's Date: 8/5/2024     Precautions  Precautions: Weight Bearing As Tolerated Left Lower Extremity, Non Weight Bearing Right Upper Extremity  Comments: Orthostatic hypotension         Subjective    Pt agreeable for an OT tx session.     Objective       08/05/24 1431   OT Charge Group   OT Self Care / ADL (Units) 1   OT Therapeutic Exercise (Units) 1   OT Total Time Spent   OT Individual Total Time Spent (Mins) 30   Pain   Intervention Declines   Functional Level of Assist   Grooming Modified Independent   Grooming Description Seated in wheelchair at sink   Bed, Chair, Wheelchair Transfer Standby Assist   Bed Chair Wheelchair Transfer Description Set-up of equipment;Increased time;Initial preparation for task   Sitting Upper Body Exercises   Upper Extremity Bike Level 3 Resistance  (15 min with L UE only.)   Interdisciplinary Plan of Care Collaboration   IDT Collaboration with  Family / Caregiver   Patient Position at End of Therapy In Bed;Bed Alarm On;Call Light within Reach;Tray Table within Reach;Phone within Reach;Family / Friend in Room         Assessment    Pt was very pleasant and cooperative. No new c/o pain/discomfort verbalized during session.  Pt tolerated OT tx well.  Strengths: Able to follow instructions, Alert and oriented, Effective communication skills, Independent prior level of function, Manages pain appropriately, Motivated for self care and independence, Pleasant and cooperative, Supportive family, Willingly participates in therapeutic activities  Barriers: Decreased endurance, Generalized weakness, Impaired balance, Limited mobility    Plan    Will continue with OT POC.    DME  OT DME Recommendations  Bathroom Equipment:  (TUB TRANSFER BENCH)          Occupational Therapy Goals (Active)       Problem: Dressing       Dates: Start:  07/25/24         Goal: STG-Within  one week, patient will dress LB with min assist using LRAD as needed       Dates: Start:  07/25/24    Expected End:  08/07/24               Problem: Functional Transfers       Dates: Start:  07/25/24         Goal: STG-Within one week, patient will transfer to toilet at SPV level using LRAD as needed       Dates: Start:  07/25/24    Expected End:  08/07/24               Problem: OT Long Term Goals       Dates: Start:  07/25/24         Goal: LTG-By discharge, patient will complete basic self care tasks at mod I level using LRAD as needed       Dates: Start:  07/25/24    Expected End:  08/07/24            Goal: LTG-By discharge, patient will perform bathroom transfers at mod I level using LRAD as needed       Dates: Start:  07/25/24    Expected End:  08/07/24

## 2024-08-05 NOTE — PROGRESS NOTES
Received bedside shift report from Chrissie ALFRED RN regarding patient and assumed care. Patient awake, calm and stable, currently positioned in bed for comfort and safety; call light within reach. Denies pain or discomfort at this time. Will continue to monitor.

## 2024-08-05 NOTE — THERAPY
Physical Therapy   Daily Treatment     Patient Name: Gali Broderick  Age:  79 y.o., Sex:  female  Medical Record #: [id number]  Today's Date: 8/5/2024     Precautions  Precautions: Weight Bearing As Tolerated Left Lower Extremity, Non Weight Bearing Right Upper Extremity  Comments: Orthostatic hypotension    Subjective    Session 1 (0314-7108): Pt received in w/c and willing to participate in therapy following getting dressed.  Session 2 (2914-4285): Pt received in bed and willing to participate in therapy.     Objective       08/05/24 0830   PT Charge Group   PT Therapeutic Exercise (Units) 1   PT Therapeutic Activities (Units) 1   PT Total Time Spent   PT Individual Total Time Spent (Mins) 30   Precautions   Precautions Weight Bearing As Tolerated Left Lower Extremity;Non Weight Bearing Right Upper Extremity   Vitals   O2 Delivery Device None - Room Air   Pain   Intervention Rest   Pain 0 - 10 Group   Location Knee   Location Orientation Left   Therapist Pain Assessment During Activity  (medial L knee pain during weight bearing tasks)   Wheelchair Functional Level of Assist   Wheelchair Assist Modified Independent   Distance Wheelchair (Feet or Distance) 150   Wheelchair Description Extra time   Transfer Functional Level of Assist   Bed, Chair, Wheelchair Transfer Standby Assist   Bed Chair Wheelchair Transfer Description Set-up of equipment  (stand pivot w/ UE support)   Sitting Lower Body Exercises   Ankle Pumps 1 set of 10;Right ;Left   Long Arc Quad 1 set of 10;Right;Left   Marching Reciprocal;1 set of 10   Sit to Stand 1 set of 10   Bed Mobility    Sit to Stand Standby Assist   Interdisciplinary Plan of Care Collaboration   IDT Collaboration with  Occupational Therapist   Patient Position at End of Therapy Seated;Chair Alarm On;Other (Comments)  (hand off of care to OT in therapy gym)   Physical Therapist Assigned   Assigned PT / Treatment Time / Comments EB/JR/90min   Strengths & Barriers   Strengths Able to  follow instructions;Alert and oriented;Effective communication skills;Independent prior level of function;Motivated for self care and independence;Pleasant and cooperative;Willingly participates in therapeutic activities;Supportive family   Barriers Decreased endurance;Fatigue;Generalized weakness;Impaired activity tolerance;Impaired balance;Limited mobility;Pain;Impulsive     Anitha for LB dressing, ModA for UB dressing including MaxA for arm sling doff and don.       08/05/24 1030   PT Charge Group   PT Gait Training (Units) 1   PT Therapeutic Exercise (Units) 1   PT Therapeutic Activities (Units) 2   PT Total Time Spent   PT Individual Total Time Spent (Mins) 60   Precautions   Precautions Weight Bearing As Tolerated Left Lower Extremity;Non Weight Bearing Right Upper Extremity   Vitals   O2 Delivery Device None - Room Air   Pain   Intervention Cold Pack;Other (Comments);Rest  (ice massage applied for 10 minutes, pt reported numbness and decreased pain during weight bearing activities)   Pain 0 - 10 Group   Location Knee   Location Orientation Left   Therapist Pain Assessment During Activity  (medial L knee pain during weight bearing tasks)   Gait Functional Level of Assist    Gait Level Of Assist Contact Guard Assist   Assistive Device Jelani-Walker   Distance (Feet) 12  (plus additional 1x8ft)   # of Times Distance was Traveled 1   Deviation Step To;Antalgic;Decreased Base Of Support  (3-point pattern; limited by L knee pain)   Wheelchair Functional Level of Assist   Wheelchair Assist Modified Independent   Distance Wheelchair (Feet or Distance) 150   Wheelchair Description Extra time   Transfer Functional Level of Assist   Bed, Chair, Wheelchair Transfer Standby Assist   Bed Chair Wheelchair Transfer Description Set-up of equipment  (stand pivot w/ UE support)   Sitting Lower Body Exercises   Ankle Pumps 1 set of 10;Bilateral;Weight (See Comments for lbs)  (1.5 ankle weights)   Hip Abduction 1 set of  10;Bilateral;Light Resistance Theraband  (Pink theraband)   Long Arc Quad 1 set of 10;Right;Left;Weight (See Comments for lbs)  (1.5 ankle weights)   Marching Reciprocal;1 set of 10;Other Resistance (See Comments)  (1.5 ankle weights)   Hamstring Curl 1 set of 10;Right;Left;Light Resistance Theraband  (Pink theraband)   Bed Mobility    Supine to Sit Modified Independent   Sit to Supine Supervised   Sit to Stand Standby Assist   Interdisciplinary Plan of Care Collaboration   IDT Collaboration with  Physician   Patient Position at End of Therapy In Bed;Bed Alarm On;Call Light within Reach;Tray Table within Reach;Phone within Reach   Collaboration Comments physician performing rounds   PT DME Recommendations   Assistive Device Jelani Walker   Physical Therapist Assigned   Assigned PT / Treatment Time / Comments EB/JR/90min   Strengths & Barriers   Strengths Able to follow instructions;Alert and oriented;Effective communication skills;Independent prior level of function;Motivated for self care and independence;Pleasant and cooperative;Willingly participates in therapeutic activities;Supportive family   Barriers Decreased endurance;Fatigue;Generalized weakness;Impaired activity tolerance;Impaired balance;Limited mobility;Pain;Impulsive     L hamstring stretch emphasizing medial hamstrings, 3 x 60 seconds    Assessment    Pt demonstrated significant improvement in standing tolerance including increased total ambulation distance in today's session. Pt continues to be limited by pain, but medial L knee pain presents w/ low irritability and responded well to cold modalities.    Strengths: Able to follow instructions, Alert and oriented, Effective communication skills, Independent prior level of function, Motivated for self care and independence, Pleasant and cooperative, Willingly participates in therapeutic activities, Supportive family  Barriers: Decreased endurance, Fatigue, Generalized weakness, Impaired activity  tolerance, Impaired balance, Limited mobility, Pain, Impulsive    Plan    L UE strengthening (triceps)  Progressing to gait with jelani walker  Strengthening in all planes  Balance  Transfer safety  Car transfers and stairs as able    DME  PT DME Recommendations  Assistive Device: Jelani Walker    Passport items to be completed:  Get in/out of bed safely, in/out of a vehicle, safely use mobility device, walk or wheel around home/community, navigate up and down stairs, show how to get up/down from the ground, ensure home is accessible, demonstrate HEP, complete caregiver training    Physical Therapy Problems (Active)       Problem: Balance       Dates: Start:  07/24/24         Goal: STG-Within one week, patient will tolerate caputo balance test.       Dates: Start:  07/24/24    Expected End:  08/07/24               Problem: Mobility       Dates: Start:  07/24/24         Goal: STG-Within one week, patient will ambulate 10 ft using hemiwalker with contact guard assist.        Dates: Start:  07/24/24    Expected End:  08/07/24            Goal: STG-Within one week, patient will ambulate up/down a curb using hemiwalker and contact guard assist.        Dates: Start:  07/24/24    Expected End:  08/07/24               Problem: Mobility Transfers       Dates: Start:  07/24/24         Goal: STG-Within one week, patient will transfer bed to chair with stand pivot transfer safely with supervision assist.        Dates: Start:  07/24/24    Expected End:  08/07/24               Problem: PT-Long Term Goals       Dates: Start:  07/24/24         Goal: LTG-By discharge, patient will receive >35/56 on caputo balance test.        Dates: Start:  07/24/24    Expected End:  08/07/24            Goal: LTG-By discharge, patient will ambulate 100 ft using hemiwalker or LRAD with supervision assist.        Dates: Start:  07/24/24    Expected End:  08/07/24            Goal: LTG-By discharge, patient will perform home exercise program using handouts  independently.        Dates: Start:  07/24/24    Expected End:  08/07/24            Goal: LTG-By discharge, patient will transfer in/out of a car using hemiwalker with supervision assist.        Dates: Start:  07/24/24    Expected End:  08/07/24

## 2024-08-05 NOTE — WOUND TEAM
[facility]  Inpatient Services  Wound and Skin Care Follow-up    Admission Date: 7/23/2024     Last order of IP CONSULT TO WOUND CARE was found on 7/29/2024 from Hospital Encounter on 7/23/2024     HPI, PMH, SH: Reviewed    Past Surgical History:   Procedure Laterality Date    TIBIA NAILING INTRAMEDULLARY Left 7/16/2024    Procedure: INSERTION, INTRAMEDULLARY EVANGELINA, TIBIA;  Surgeon: Ramírez Denney M.D.;  Location: SURGERY [facility];  Service: Orthopedics    ABDOMINAL HYSTERECTOMY TOTAL      EYE SURGERY      cataract    HERNIA REPAIR      TONSILLECTOMY AND ADENOIDECTOMY       Social History     Tobacco Use    Smoking status: Never    Smokeless tobacco: Never   Substance Use Topics    Alcohol use: Yes     Alcohol/week: 3.0 oz     Types: 5 Glasses of wine per week     No chief complaint on file.    Diagnosis: Multiple trauma [T07.XXXA]    Unit where seen by Wound Team: [facility]14/01     WOUND FOLLOW UP RELATED TO:  Coccyx       WOUND TEAM PLAN OF CARE - Frequency of Follow-up:   Nursing to follow dressing orders written for wound care. Contact wound team if area fails to progress, deteriorates or with any questions/concerns if something comes up before next scheduled follow up (See below as to whether wound is following and frequency of wound follow up)  Dressing changes by wound team:                   Not following, consult as needed  -      WOUND HISTORY:     PT had a GLF 7/15/2024 resulting in a left tibial and right humerus fractures. LLE with ORF. Pt has area of concern on sacrum, appears to be moisture related. Area is open full thickness with red blanchable tissue. Surrounding area pink and blanchable with two abrasions or bruises most likely from fall.         WOUND ASSESSMENT/LDA  Wound 07/23/24 Coccyx (Active)   Date First Assessed/Time First Assessed: 07/23/24 1648   Present on Original Admission: Yes  Hand Hygiene Completed: Yes  Location: Coccyx      Assessments 8/5/2024  3:00 PM   Wound  Image     Site Assessment Clean;Dry;Intact;Pink   Periwound Assessment Blanchable erythema   Margins Attached edges   Closure Open to air   Drainage Amount None   Treatments Cleansed;Site care   Wound Cleansing Foam Cleanser/Washcloth   Periwound Protectant Barrier Paste   Dressing Status Open to Air   Dressing Options Open to Air   Wound Team Following Not following   WOUND NURSE ONLY - Time Spent with Patient (mins) 30        Vascular:    MARCE:   No results found.    Lab Values:    Lab Results   Component Value Date/Time    WBC 5.8 07/30/2024 05:52 AM    RBC 3.28 (L) 07/30/2024 05:52 AM    HEMOGLOBIN 10.1 (L) 07/30/2024 05:52 AM    HEMATOCRIT 30.8 (L) 07/30/2024 05:52 AM    HBA1C 4.9 07/24/2024 06:05 AM         Culture Results show:  No results found for this or any previous visit (from the past 720 hour(s)).    Pain Level/Medicated:  Patient denies pain       INTERVENTIONS BY WOUND TEAM:  Chart and images reviewed. Discussed with bedside RN. All areas of concern (based on picture review, LDA review and discussion with bedside RN) have been thoroughly assessed. Documentation of areas based on significant findings. This RN in to assess patient. Performed standard wound care which includes appropriate positioning, dressing removal and non-selective debridement. Pictures and measurements obtained weekly if/when required.    Wound:  Coccyx  Preparation for Dressing removal: Open to air  Cleansed/Non-selectively Debrided with:  Moist warm washcloth  Estefany wound: Cleansed with Moist warm washcloth, Prepped with N/A  Primary Dressing:  BEVERLEY    Advanced Wound Care Discharge Planning  Number of Clinicians necessary to complete wound care: 1  Is patient requiring IV pain medications for dressing changes:  No   Length of time for dressing change 20 min. (This does not include chart review, pre-medication time, set up, clean up or time spent charting.)    Interdisciplinary consultation: Patient, Bedside RN (Elsa),  DANIEL Albrecht .   Pressure injury and staging reviewed with  DANIEL Albrecht .    EVALUATION / RATIONALE FOR TREATMENT:     Date:  08/05/24  Wound Status:  Wound resolved/healed    Skin intact with blanchable erythema. Education provided to patient and  at bedside for continued use of barrier paste as patient states that she feel coccyx itch at end of day and discussed constant contact moisture. Patient and  verbalized understanding.         Date:  07/24/24  Wound Status:  Initial evaluation     PT had a GLF 7/15/2024 resulting in a left tibial and right humerus fractures. LLE with ORF. Pt has area of concern on sacrum, appears to be moisture related. Area is open full thickness with red blanchable tissue. Surrounding area pink and blanchable with two abrasions or bruises most likely from fall.     Goals: Steady decrease in wound area and depth weekly.    NURSING PLAN OF CARE ORDERS:  No new orders this visit    NUTRITION RECOMMENDATIONS   Wound Team Recommendations:  N/A     DIET ORDERS (From admission to next 24h)       Start     Ordered    07/23/24 1547  Diet Order Diet: Regular  ALL MEALS        Question:  Diet:  Answer:  Regular    07/23/24 1546                    PREVENTATIVE INTERVENTIONS:   Q shift Sascha - performed per nursing policy  Q shift pressure point assessments - performed per nursing policy    Surface/Positioning  Waffle overlay  - Currently in Place    Containment/Moisture Prevention    Barrier paste - Currently in Place    Mobilization      Working with therapies      Anticipated discharge plans:  Self/Family Care        Vac Discharge Needs:  Vac Discharge plan is purely a recommendation from wound team and not a requirement for discharge unless otherwise stated by physician.  Not Applicable Pt not on a wound vac

## 2024-08-05 NOTE — CARE PLAN
"  Problem: Knowledge Deficit - Standard  Goal: Patient and family/care givers will demonstrate understanding of plan of care, disease process/condition, diagnostic tests and medications  Outcome: Progressing  Note: Pt agrees with plan of care tonight regarding medications and safety.  Will continue to monitor patient.     Problem: Pain - Standard  Goal: Alleviation of pain or a reduction in pain to the patient’s comfort goal  Outcome: Progressing  Note: C/o pain to L knee and RUE, medicated with scheduled Tylenol and prn Oxycodone.  Has + relief.  See MAR and doc flow sheet.  Will continue to monitor patient.     Problem: Fall Risk - Rehab  Goal: Patient will remain free from falls  Outcome: Progressing  Note: Fifi Evangelista Fall risk Assessment Score: 13      Moderate fall risk Interventions  - Bed and strip alarm   - Yellow sign by the door   - Yellow wrist band \"Fall risk\"  - Room near to the nurse station  - Do not leave patient unattended in the bathroom  - Fall risk education provided        The patient is Stable - Low risk of patient condition declining or worsening    Shift Goals  Clinical Goals: Safety  Patient Goals: Sleep well  Family Goals: Support, POC updates    Progress made toward(s) clinical / shift goals:  progressing          " Quality 47: Advance Care Plan: Advance Care Planning discussed and documented; advance care plan or surrogate decision maker documented in the medical record. Quality 130: Documentation Of Current Medications In The Medical Record: Current Medications Documented Quality 226: Preventive Care And Screening: Tobacco Use: Screening And Cessation Intervention: Patient screened for tobacco use and is an ex/non-smoker Quality 111:Pneumonia Vaccination Status For Older Adults: Pneumococcal Vaccination Previously Received Detail Level: Detailed Quality 431: Preventive Care And Screening: Unhealthy Alcohol Use - Screening: Patient screened for unhealthy alcohol use using a single question and scores less than 2 times per year Quality 110: Preventive Care And Screening: Influenza Immunization: Influenza Immunization Administered during Influenza season

## 2024-08-05 NOTE — CARE PLAN
The patient is Watcher - Medium risk of patient condition declining or worsening    Shift Goals  Clinical Goals: safety, pain control    Problem: Urinary Elimination  Goal: Establish and maintain regular urinary output  Outcome: Progressing     Problem: Pain - Standard  Goal: Alleviation of pain or a reduction in pain to the patient’s comfort goal  Note: Patient has been having RUE and LLE pain, scheduled tylenol and oxycodone has been given.

## 2024-08-06 ENCOUNTER — APPOINTMENT (OUTPATIENT)
Dept: PHYSICAL THERAPY | Facility: REHABILITATION | Age: 79
DRG: 560 | End: 2024-08-06
Attending: PHYSICAL MEDICINE & REHABILITATION
Payer: COMMERCIAL

## 2024-08-06 ENCOUNTER — APPOINTMENT (OUTPATIENT)
Dept: OCCUPATIONAL THERAPY | Facility: REHABILITATION | Age: 79
DRG: 560 | End: 2024-08-06
Attending: PHYSICAL MEDICINE & REHABILITATION
Payer: COMMERCIAL

## 2024-08-06 LAB
BASOPHILS # BLD AUTO: 0.5 % (ref 0–1.8)
BASOPHILS # BLD: 0.02 K/UL (ref 0–0.12)
EOSINOPHIL # BLD AUTO: 0.11 K/UL (ref 0–0.51)
EOSINOPHIL NFR BLD: 2.9 % (ref 0–6.9)
ERYTHROCYTE [DISTWIDTH] IN BLOOD BY AUTOMATED COUNT: 47.1 FL (ref 35.9–50)
HCT VFR BLD AUTO: 32.2 % (ref 37–47)
HGB BLD-MCNC: 10.3 G/DL (ref 12–16)
IMM GRANULOCYTES # BLD AUTO: 0.02 K/UL (ref 0–0.11)
IMM GRANULOCYTES NFR BLD AUTO: 0.5 % (ref 0–0.9)
LYMPHOCYTES # BLD AUTO: 0.86 K/UL (ref 1–4.8)
LYMPHOCYTES NFR BLD: 22.5 % (ref 22–41)
MCH RBC QN AUTO: 30.2 PG (ref 27–33)
MCHC RBC AUTO-ENTMCNC: 32 G/DL (ref 32.2–35.5)
MCV RBC AUTO: 94.4 FL (ref 81.4–97.8)
MONOCYTES # BLD AUTO: 0.3 K/UL (ref 0–0.85)
MONOCYTES NFR BLD AUTO: 7.8 % (ref 0–13.4)
NEUTROPHILS # BLD AUTO: 2.52 K/UL (ref 1.82–7.42)
NEUTROPHILS NFR BLD: 65.8 % (ref 44–72)
NRBC # BLD AUTO: 0 K/UL
NRBC BLD-RTO: 0 /100 WBC (ref 0–0.2)
PLATELET # BLD AUTO: 208 K/UL (ref 164–446)
PMV BLD AUTO: 10.7 FL (ref 9–12.9)
RBC # BLD AUTO: 3.41 M/UL (ref 4.2–5.4)
WBC # BLD AUTO: 3.8 K/UL (ref 4.8–10.8)

## 2024-08-06 PROCEDURE — 700111 HCHG RX REV CODE 636 W/ 250 OVERRIDE (IP): Mod: JZ | Performed by: PHYSICAL MEDICINE & REHABILITATION

## 2024-08-06 PROCEDURE — 700101 HCHG RX REV CODE 250: Performed by: PHYSICAL MEDICINE & REHABILITATION

## 2024-08-06 PROCEDURE — 36415 COLL VENOUS BLD VENIPUNCTURE: CPT

## 2024-08-06 PROCEDURE — 97535 SELF CARE MNGMENT TRAINING: CPT

## 2024-08-06 PROCEDURE — 770010 HCHG ROOM/CARE - REHAB SEMI PRIVAT*

## 2024-08-06 PROCEDURE — 97530 THERAPEUTIC ACTIVITIES: CPT

## 2024-08-06 PROCEDURE — 97110 THERAPEUTIC EXERCISES: CPT

## 2024-08-06 PROCEDURE — 97116 GAIT TRAINING THERAPY: CPT

## 2024-08-06 PROCEDURE — 85025 COMPLETE CBC W/AUTO DIFF WBC: CPT

## 2024-08-06 PROCEDURE — 700102 HCHG RX REV CODE 250 W/ 637 OVERRIDE(OP): Performed by: PHYSICAL MEDICINE & REHABILITATION

## 2024-08-06 PROCEDURE — A9270 NON-COVERED ITEM OR SERVICE: HCPCS | Performed by: PHYSICAL MEDICINE & REHABILITATION

## 2024-08-06 RX ORDER — MIDODRINE HYDROCHLORIDE 5 MG/1
5 TABLET ORAL 3 TIMES DAILY PRN
Qty: 60 TABLET | Refills: 1 | Status: SHIPPED | OUTPATIENT
Start: 2024-08-06 | End: 2024-08-07

## 2024-08-06 RX ORDER — OXYCODONE HYDROCHLORIDE 5 MG/1
5 TABLET ORAL EVERY 6 HOURS PRN
Qty: 28 TABLET | Refills: 0 | Status: SHIPPED | OUTPATIENT
Start: 2024-08-06 | End: 2024-08-20

## 2024-08-06 RX ADMIN — OMEPRAZOLE 20 MG: 20 CAPSULE, DELAYED RELEASE ORAL at 08:31

## 2024-08-06 RX ADMIN — OXYCODONE HYDROCHLORIDE 5 MG: 5 TABLET ORAL at 13:56

## 2024-08-06 RX ADMIN — OXYCODONE HYDROCHLORIDE 5 MG: 5 TABLET ORAL at 05:53

## 2024-08-06 RX ADMIN — ACETAMINOPHEN 1000 MG: 500 TABLET, FILM COATED ORAL at 20:14

## 2024-08-06 RX ADMIN — LIDOCAINE 1 PATCH: 4 PATCH TOPICAL at 17:10

## 2024-08-06 RX ADMIN — ACETAMINOPHEN 1000 MG: 500 TABLET, FILM COATED ORAL at 13:55

## 2024-08-06 RX ADMIN — ENOXAPARIN SODIUM 40 MG: 100 INJECTION SUBCUTANEOUS at 17:10

## 2024-08-06 RX ADMIN — ACETAMINOPHEN 1000 MG: 500 TABLET, FILM COATED ORAL at 08:31

## 2024-08-06 RX ADMIN — MIDODRINE HYDROCHLORIDE 5 MG: 2.5 TABLET ORAL at 08:43

## 2024-08-06 ASSESSMENT — BRIEF INTERVIEW FOR MENTAL STATUS (BIMS)
BIMS SUMMARY SCORE: 15
WHAT MONTH IS IT: ACCURATE WITHIN 5 DAYS
ASKED TO RECALL BLUE: YES, NO CUE REQUIRED
WHAT YEAR IS IT: CORRECT
ASKED TO RECALL BED: YES, NO CUE REQUIRED
WHAT DAY OF THE WEEK IS IT: CORRECT
INITIAL REPETITION OF BED BLUE SOCK - FIRST ATTEMPT: 3
ASKED TO RECALL SOCK: YES, NO CUE REQUIRED

## 2024-08-06 ASSESSMENT — ACTIVITIES OF DAILY LIVING (ADL)
BED_CHAIR_WHEELCHAIR_TRANSFER_DESCRIPTION: SUPERVISION FOR SAFETY
SHOWER_TRANSFER_LEVEL_OF_ASSIST: REQUIRES SUPERVISION WITH SHOWER TRANSFER
TOILET_TRANSFER_LEVEL_OF_ASSIST: ABLE TO COMPLETE TOILET TRANSFER WITHOUT ASSIST
TOILET_TRANSFER_DESCRIPTION: GRAB BAR
TOILETING_LEVEL_OF_ASSIST: ABLE TO COMPLETE TOILETING WITHOUT ASSIST
BED_CHAIR_WHEELCHAIR_TRANSFER_DESCRIPTION: INCREASED TIME;INITIAL PREPARATION FOR TASK

## 2024-08-06 ASSESSMENT — PAIN DESCRIPTION - PAIN TYPE
TYPE: ACUTE PAIN

## 2024-08-06 ASSESSMENT — GAIT ASSESSMENTS
ASSISTIVE DEVICE: HEMI-WALKER
GAIT LEVEL OF ASSIST: CONTACT GUARD ASSIST
DEVIATION: STEP TO;ANTALGIC;DECREASED BASE OF SUPPORT
DISTANCE (FEET): 12

## 2024-08-06 NOTE — THERAPY
Physical Therapy   Daily Treatment     Patient Name: Gali Broderick  Age:  79 y.o., Sex:  female  Medical Record #: [id number]  Today's Date: 8/6/2024     Precautions  Precautions: (P) Weight Bearing As Tolerated Left Lower Extremity, Non Weight Bearing Right Upper Extremity  Comments: Orthostatic hypotension    Subjective    Patient feels prepared and confident with discharge plan for tomorrow home in [place] with family support     Objective       08/06/24 1501   PT Charge Group   PT Therapeutic Activities (Units) 4   PT Total Time Spent   PT Individual Total Time Spent (Mins) 60   Precautions   Precautions Weight Bearing As Tolerated Left Lower Extremity;Non Weight Bearing Right Upper Extremity   Supine Lower Body Exercise   Ankle Pumps 1 set of 10;Bilateral   Comments finished session in bed supine with legs elevated to decrease left leg edema  (PT demonstration of LE PROM with gait belt assist bilaterally- hamstring, ITB, groin, calf)   Sitting Lower Body Exercises   Sitting Lower Body Exercises   (declined review)   Comments seated ice massage 6min left medial knee   Standing Lower Body Exercises   Standing Lower Body Exercises   (reclined review)   Interdisciplinary Plan of Care Collaboration   IDT Collaboration with  Physical Therapist;Family / Caregiver   Collaboration Comments treatment/discharge planning;  present  for session- feels prepared for discharge home tomorrow       Assessment    Patient demonstrates transfer with SPV, w/c mod I level; declines additional practice with kari-walker at this time    Strengths: Able to follow instructions, Alert and oriented, Effective communication skills, Independent prior level of function, Motivated for self care and independence, Pleasant and cooperative, Willingly participates in therapeutic activities, Supportive family  Barriers: Decreased endurance, Fatigue, Generalized weakness, Impaired activity tolerance, Impaired balance, Limited mobility, Pain,  Impulsive    Plan    Discharge patient    DME  PT DME Recommendations  Assistive Device: Jelani Walker    Passport items to be completed:  Get in/out of bed safely, in/out of a vehicle, safely use mobility device, walk or wheel around home/community, navigate up and down stairs, show how to get up/down from the ground, ensure home is accessible, demonstrate HEP, complete caregiver training    Physical Therapy Problems (Active)       Problem: Balance       Dates: Start:  07/24/24         Goal: STG-Within one week, patient will tolerate caputo balance test.       Dates: Start:  07/24/24    Expected End:  08/07/24               Problem: Mobility       Dates: Start:  07/24/24         Goal: STG-Within one week, patient will ambulate 10 ft using hemiwalker with contact guard assist.        Dates: Start:  07/24/24    Expected End:  08/07/24            Goal: STG-Within one week, patient will ambulate up/down a curb using hemiwalker and contact guard assist.        Dates: Start:  07/24/24    Expected End:  08/07/24               Problem: Mobility Transfers       Dates: Start:  07/24/24         Goal: STG-Within one week, patient will transfer bed to chair with stand pivot transfer safely with supervision assist.        Dates: Start:  07/24/24    Expected End:  08/07/24               Problem: PT-Long Term Goals       Dates: Start:  07/24/24         Goal: LTG-By discharge, patient will receive >35/56 on caputo balance test.        Dates: Start:  07/24/24    Expected End:  08/07/24            Goal: LTG-By discharge, patient will ambulate 100 ft using hemiwalker or LRAD with supervision assist.        Dates: Start:  07/24/24    Expected End:  08/07/24            Goal: LTG-By discharge, patient will perform home exercise program using handouts independently.        Dates: Start:  07/24/24    Expected End:  08/07/24            Goal: LTG-By discharge, patient will transfer in/out of a car using hemiwalker with supervision assist.         Dates: Start:  07/24/24    Expected End:  08/07/24

## 2024-08-06 NOTE — PROGRESS NOTES
"  Physical Medicine & Rehabilitation Progress Note    Encounter Date: 8/6/2024    Chief Complaint: Weakness    Interval Events (Subjective):  Seen in bed. Tolerating therapy. Held midodrine this afternoon. Will buy [employer] on [employer].    Objective:  VITAL SIGNS: BP (!) 144/63   Pulse 92   Temp 36.7 °C (98 °F) (Oral)   Resp 18   Ht 1.626 m (5' 4\")   Wt 76.5 kg (168 lb 10.4 oz)   SpO2 95%   BMI 28.95 kg/m²   Gen: No acute distress, well developed well nourished adult  HEENT: Normal Cephalic Atraumatic, Normal conjunctiva.   CV: warm extremities, well perfused, no edema  Resp: symmetric chest rise, breathing comfortably on room air  Abd: Soft, Non distended  Extremities: normal bulk, no atrophy  Skin: no visible rashes or lesions.   Neuro: alert, awake  Psych: Mood and affect appropriate and congruent    Laboratory Values:  Recent Results (from the past 72 hour(s))   CBC WITH DIFFERENTIAL    Collection Time: 08/06/24  6:07 AM   Result Value Ref Range    WBC 3.8 (L) 4.8 - 10.8 K/uL    RBC 3.41 (L) 4.20 - 5.40 M/uL    Hemoglobin 10.3 (L) 12.0 - 16.0 g/dL    Hematocrit 32.2 (L) 37.0 - 47.0 %    MCV 94.4 81.4 - 97.8 fL    MCH 30.2 27.0 - 33.0 pg    MCHC 32.0 (L) 32.2 - 35.5 g/dL    RDW 47.1 35.9 - 50.0 fL    Platelet Count 208 164 - 446 K/uL    MPV 10.7 9.0 - 12.9 fL    Neutrophils-Polys 65.80 44.00 - 72.00 %    Lymphocytes 22.50 22.00 - 41.00 %    Monocytes 7.80 0.00 - 13.40 %    Eosinophils 2.90 0.00 - 6.90 %    Basophils 0.50 0.00 - 1.80 %    Immature Granulocytes 0.50 0.00 - 0.90 %    Nucleated RBC 0.00 0.00 - 0.20 /100 WBC    Neutrophils (Absolute) 2.52 1.82 - 7.42 K/uL    Lymphs (Absolute) 0.86 (L) 1.00 - 4.80 K/uL    Monos (Absolute) 0.30 0.00 - 0.85 K/uL    Eos (Absolute) 0.11 0.00 - 0.51 K/uL    Baso (Absolute) 0.02 0.00 - 0.12 K/uL    Immature Granulocytes (abs) 0.02 0.00 - 0.11 K/uL    NRBC (Absolute) 0.00 K/uL       Medications:  Scheduled Medications   Medication Dose Frequency    lidocaine  1 Patch " DAILY    oxyCODONE immediate-release  5 mg BID    midodrine  5 mg TID WITH MEALS    acetaminophen  1,000 mg TID    Pharmacy Consult Request  1 Each PHARMACY TO DOSE    senna-docusate  2 Tablet BID    omeprazole  20 mg DAILY    enoxaparin (LOVENOX) injection  40 mg DAILY AT 1800     PRN medications: Respiratory Therapy Consult, hydrALAZINE, senna-docusate **AND** polyethylene glycol/lytes, ondansetron **OR** ondansetron, sodium chloride, oxyCODONE immediate-release **OR** oxyCODONE immediate-release    Diet:  Current Diet Order   Procedures    Diet Order Diet: Regular       Medical Decision Making and Plan:  Fracture of right (dominant side) humerus   - 3 part proximal humerus fracture   - s/p closed manipulation and treatment by Dr. Ramírez Denney MD on 7/16   - NWB RUE   - PT/OT continue  -patient does not qualify for home health due to insurance. To continue therapy she needs to do one step and get into her car. Extend inpatient stay to accomplish these goals     Fracture of left tibia   - Comminuted proximal tibia fracture   - S/p Open reduction internal fixation left tibial shaft with intramedullary nail by Dr. Ramírez Denney MD on 7/16   - WBAT LLE   - PT/OT     Acute blood loss anemia   - Hgb 9.6 on 7/21  -7/30- Hb 10.1     Orthostatic Hypotension   - Secondary to acute blood loss anemia and meds  - JACKIE hose   - Abdominal binder  -7/26- DC telmisartam  -7/30- start midodrine 5mg TID  -continue midodrine PRN at discharge  -check BP at home 2 times per day. If BP above 120 and not symptomatic patient can stop midodrine. If BP above 140 consistantly, call PCP to restart telmisartam     Hypertension   - was on Telmisartan 40mg Q evening   -DC telmisartan due to low bp   - If BP above 140 consistantly, call PCP to restart telmisartam     Pain:  - Neuroceptic - On Tylenol prn, continue oxycodone prn     DVT prophylaxis:  continue lovenox     GI prophylaxis:  On Prilosec 20mg daily         -Follow-up Ortho, PCP        ____________________________________    [facility]  Physical Medicine & Rehabilitation   Brain Injury Medicine   ____________________________________

## 2024-08-06 NOTE — DISCHARGE PLANNING
Case management   I met with pt reviewing all cm dc isntructions.   Reviewed signed copy of IMM and answered all questions.  Family training completed w/ spouse.   He will provide transportation home on day of dc.   Disabled parking permit application completed and faxed to [facility].  Dc date /disposition: 8/7 home w/ spouse; home health     Follow-up Information:  JOSE FowlerNSantosh  [address]  [place] [address] [phone]  Call to dominique follow up with your Primary Care MD.     Ramírez Denney M.D.  [address] [address] [phone]  Call to schedule follow up with your ORTHOPAEDIC MD .     [facility]  [address]  [place] [address]  [phone]  Provider for kari walker  Wheelchair at home.     [facility]  [address] [id number] [phone]    A referral has been sent for out pt physical therapy and occupional therapy.  They will call you to arlet follow up appointments.

## 2024-08-06 NOTE — PROGRESS NOTES
NURSING DAILY NOTE    Name: Gali Broderick   Date of Admission: 7/23/2024   Admitting Diagnosis: Multiple trauma  Attending Physician: Elias Welch D.o.  Allergies: Iodine    Safety  Patient Assist  SBA to CGA  Patient Precautions  Weight Bearing As Tolerated Left Lower Extremity,   Non Weight Bearing Right Upper Extremity  Precaution Comments  Orthostatic hypotension  Bed Transfer Status  Standby Assist  Toilet Transfer Status   Standby Assist  Assistive Devices  Rails, Wheelchair  Oxygen  None - Room Air  Diet/Therapeutic Dining  Current Diet Order   Procedures    Diet Order Diet: Regular     Pill Administration  whole  Agitated Behavioral Scale     ABS Level of Severity       Fall Risk  Has the patient had a fall this admission?   No  Fifi Evangelista Fall Risk Scoring  12, MODERATE RISK  Fall Risk Safety Measures  bed alarm, chair alarm, poor balance, and low vision/ hearing    Vitals  Temperature: 36.7 °C (98 °F)  Temp src: Oral  Pulse: 96  Respiration: 18  Blood Pressure : 125/52  Blood Pressure MAP (Calculated): 76 MM HG  BP Location: Left, Upper Arm  Patient BP Position: Rossi's Position     Oxygen  Pulse Oximetry: 94 %  O2 (LPM): 0  O2 Delivery Device: None - Room Air    Bowel and Bladder  Last Bowel Movement  08/04/24  Stool Type  Type 4: Like a sausage or snake, smooth and soft  Bowel Device  Bathroom  Continent  Bladder: Did not void   Bowel: No movement  Bladder Function  Urine Void (mL):  (Large)  Number of Times Voided: 1  Urinary Options: Yes  Urine Color: Yellow  Genitourinary Assessment   Bladder Assessment (WDL):  Within Defined Limits  Ariza Catheter: Not Applicable  Urine Color: Yellow  Bladder Device: Bathroom  Bladder Scan: Post Void  $ Bladder Scan Results (mL): 6    Skin  Sascha Score   17  Sensory Interventions   Bed Types: Standard/Trauma Mattress with Overlay  Skin Preventative Measures: Waffle Overlay, Pillows in Use for  Support / Positioning  Moisture Interventions  Moisturizers/Barriers: Barrier Wipes, Barrier Paste      Pain  Pain Rating Scale  5 - Interrupts some activities  Pain Location  Knee  Pain Location Orientation  Left  Pain Interventions   Declines    ADLs    Bathing   Shower, Staff (OT)  Linen Change   Partial  Personal Hygiene  Moist Estefany Wipes  Chlorhexidine Bath      Oral Care  Brushed Teeth  Teeth/Dentures     Shave     Nutrition Percentage Eaten  Breakfast, Between 50-75% Consumed  Environmental Precautions  Treaded Slipper Socks on Patient, Bed in Low Position  Patient Turns/Positioning  Sitting Up in Wheelchair  Patient Turns Assistance/Tolerance  Assistance of One  Bed Positions  Bed Controls On, Bed Locked  Head of Bed Elevated  Self regulated      Psychosocial/Neurologic Assessment  Psychosocial Assessment  Psychosocial (WDL):  Within Defined Limits  Neurologic Assessment  Neuro (WDL): Exceptions to WDL  Level of Consciousness: Alert  Orientation Level: Oriented X4  Cognition: Follows commands, Appropriate attention/concentration  Speech: Clear  Motor Function/Sensation Assessment: Sensation  RUE Sensation: Tingling, Pain  LLE Sensation: Pain, Tingling  EENT (WDL):  WDL Except    Cardio/Pulmonary Assessment  Edema   RUE Edema: Generalized  RLE Edema: Generalized  LLE Edema: Generalized  Respiratory Breath Sounds  RUL Breath Sounds: Clear  RML Breath Sounds: Clear  RLL Breath Sounds: Clear  BERTA Breath Sounds: Clear  LLL Breath Sounds: Clear  Cardiac Assessment   Cardiac (WDL):  WDL Except (HTN)

## 2024-08-06 NOTE — CARE PLAN
Problem: Dressing  Goal: STG-Within one week, patient will dress LB with min assist using LRAD as needed  Outcome: Met     Problem: Functional Transfers  Goal: STG-Within one week, patient will transfer to toilet at SPV level using LRAD as needed  Outcome: Met     Problem: OT Long Term Goals  Goal: LTG-By discharge, patient will complete basic self care tasks at mod I level using LRAD as needed  Outcome: Not Met  Goal: LTG-By discharge, patient will perform bathroom transfers at mod I level using LRAD as needed  Outcome: Met

## 2024-08-06 NOTE — DISCHARGE INSTRUCTIONS
Humerus Fracture Treated With Immobilization    The humerus is the large bone in the upper arm. A broken (fractured) humerus is often treated by wearing a cast, splint, or sling. This holds the broken pieces in place (immobilization) so they can heal.  What are the causes?  This condition may be caused by:  A fall.  A hard, direct hit to the arm.  A car accident.  What increases the risk?  You are more likely to develop this condition if:  You have a disease that makes the bones thin and weak.  You are elderly.  What are the signs or symptoms?  Pain.  Swelling.  Bruising.  Not being able to move your arm normally.  How is this treated?  Treatment involves wearing a cast, splint, or sling until your arm heals enough for you to begin range-of-motion exercises. You may also be prescribed pain medicine.  Follow these instructions at home:  If you have a cast or splint that cannot be taken off:  Do not put pressure on any part of the cast or splint until it is fully hardened. This may take several hours.  Do not stick anything inside the cast or splint to scratch your skin.  Check the skin around the cast or splint every day. Tell your doctor if you see problems.  You may put lotion on dry skin around the cast or splint. Do not put lotion on the skin under the cast or splint.  Keep the cast clean and dry.  If you have a splint or sling that can be taken off:  Wear the splint or sling as told by your doctor. Remove it only as told by your doctor.  Loosen the splint or sling if your fingers:  Tingle.  Become numb.  Turn cold and blue.  Keep the splint or sling clean and dry.  Bathing  Do not take baths, swim, or use a hot tub. Ask your doctor about taking showers or sponge baths.  If your cast, splint, or sling is not waterproof:  Do not let it get wet.  Cover it with a watertight covering when you take a bath or shower.  Managing pain, stiffness, and swelling    If told, put ice on the injured area. To do this:  If you  have a removable splint or sling, take it off as told by your doctor.  Put ice in a plastic bag.  Place a towel between your skin and the bag or between your cast or splint that you cannot take off and the bag.  Leave the ice on for 20 minutes, 2-3 times a day.  Take off the ice if your skin turns bright red. This is very important. If you cannot feel pain, heat, or cold, you have a greater risk of damage to the area.  Move your fingers often.  Raise the injured area above the level of your heart while you are sitting or lying down.  Driving  Do not drive or use machines while taking prescription pain medicine.  Ask your doctor when it is safe to drive if you have a cast, splint, or sling on your arm.  Activity  Do not lift anything until your doctor says that it is safe.  Return to your normal activities when your doctor says that it is safe.  Do range-of-motion exercises only as told by your doctor.  General instructions  Do not smoke or use any products that contain nicotine or tobacco. These can make it take longer for your bones to heal. If you need help quitting, ask your doctor.  Take over-the-counter and prescription medicines only as told by your doctor.  If told, take steps to prevent problems with pooping (constipation). You may need to:  Drink enough fluid to keep your pee (urine) pale yellow.  Take medicines. You will be told what medicines to take.  Eat foods that are high in fiber. These include beans, whole grains, and fresh fruits and vegetables.  Limit foods that are high in fat and sugar. These include fried or sweet foods.  Keep all follow-up visits.  Contact a doctor if:  You have any new pain, swelling, or bruising.  Your pain, swelling, and bruising do not get better.  Your cast, splint, or sling becomes loose or damaged.  Get help right away if:  Your skin or fingers on your injured arm turn blue or gray.  Your arm is cold or numb.  You have very bad pain in your injured arm.  Summary  The  humerus is the large bone in the upper arm.  A broken humerus is often treated by wearing a cast, splint, or sling.  Wear a splint or sling as told by your doctor. Remove it only as told by your doctor.  Move your fingers often.  This information is not intended to replace advice given to you by your health care provider. Make sure you discuss any questions you have with your health care provider.  Document Revised: 01/31/2022 Document Reviewed: 01/31/2022  [employer] Patient Education © 2023 [employer]    Intramedullary Nailing of Tibial Shaft Fracture, Care After  This sheet gives you information about how to care for yourself after your procedure. Your health care provider may also give you more specific instructions. If you have problems or questions, contact your health care provider.  What can I expect after the procedure?  After the procedure, it is common to have pain and swelling in your lower leg.  Follow these instructions at home:  Medicines  Take over-the-counter and prescription medicines only as told by your health care provider.  If you were prescribed an antibiotic medicine, take it as told by your health care provider. Do not stop taking the antibiotic even if you start to feel better.  Ask your health care provider if the medicine prescribed to you:  Requires you to avoid driving or using machinery.  Can cause constipation. You may need to take these actions to prevent or treat constipation:  Drink enough fluid to keep your urine pale yellow.  Take over-the-counter or prescription medicines.  Eat foods that are high in fiber, such as beans, whole grains, and fresh fruits and vegetables.  Limit foods that are high in fat and processed sugars, such as fried or sweet foods.  If you have a splint:  Wear the splint as told by your health care provider. Remove it only as told by your health care provider.  Loosen the splint if your toes tingle, become numb, or turn cold and blue.  Keep the splint clean  and dry.  Ask your health care provider when it is safe to drive.  Bathing  Do not take baths, swim, or use a hot tub until your health care provider approves. Ask your health care provider if you may take showers. You may only be allowed to take sponge baths.  If the splint is not waterproof:  Do not let it get wet.  Cover it with a watertight covering when you take a bath or shower.  Keep your bandage (dressing) dry.  Incision care    Follow instructions from your health care provider about how to take care of your incision. Make sure you:  Wash your hands with soap and water for at least 20 seconds before and after you change your dressing. If soap and water are not available, use hand .  Change your dressing as told by your health care provider.  Leave stitches (sutures), skin glue, or adhesive strips in place. These skin closures may need to stay in place for 2 weeks or longer. If adhesive strip edges start to loosen and curl up, you may trim the loose edges. Do not remove adhesive strips completely unless your health care provider tells you to do that.  Check your incision area every day for signs of infection. Check for:  More redness, swelling, or pain.  More fluid or blood.  Warmth.  Pus or a bad smell.  Managing pain, stiffness, and swelling    If directed, put ice on the injured area. To do this:  If you have a removable splint, remove it as told by your health care provider.  Put ice in a plastic bag.  Place a towel between your skin and the bag.  Leave the ice on for 20 minutes, 2-3 times a day.  Remove the ice if your skin turns bright red. This is very important. If you cannot feel pain, heat, or cold, you have a greater risk of damage to the area.  Move your toes often to reduce stiffness and swelling.  Raise (elevate) the injured area above the level of your heart while you are sitting or lying down.  Activity  Do not use the injured limb to support your full body weight until your health  care provider says that you can. You may be able to put some weight on your leg (partial weight-bearing). Use crutches or a walker as told by your health care provider.  Rest as told by your health care provider.  Avoid sitting or lying for a long time without moving. Get up to take short walks every 1-2 hours. This is important to improve blood flow and breathing. Ask for help if you feel weak or unsteady. You will be encouraged to walk with assistance as soon as you can. This will help prevent blood clots.  Do exercises as told by your health care provider. It is important to do physical therapy to regain muscle strength and range of motion in your leg.  Return to your normal activities as told by your health care provider. Ask your health care provider what activities are safe for you.  General instructions  Do not use any products that contain nicotine or tobacco, such as cigarettes, e-cigarettes, and chewing tobacco. These can delay bone healing. If you need help quitting, ask your health care provider.  Take actions to prevent falls at home, such as removing throw rugs and tripping hazards.  Keep all follow-up visits. This is important.  Contact a health care provider if:  You have more redness, swelling, or pain around your incision.  You have more fluid or blood coming from your incision.  Your incision feels warm to the touch.  You have pus or a bad smell coming from your incision.  You have a fever.  Get help right away if:  Your incision breaks open.  You have red, painful, or swollen areas in one or both legs.  You have severe pain that does not get better with medicine.  Summary  After your procedure, it is common to have pain and swelling in your lower leg.  Check your incision area every day for signs of infection, such as more redness or swelling.  Do not use the injured limb to support your full body weight until your health care provider says that you can. You may be able to put some weight on your  leg (partial weight-bearing). Use crutches or a walker as told by your health care provider.  Ask your health care provider what activities are safe for you while you recover. It is important to do physical therapy to regain strength and range of motion in your leg.  Keep all follow-up visits. This is important.  This information is not intended to replace advice given to you by your health care provider. Make sure you discuss any questions you have with your health care provider.  Document Revised: 06/02/2021 Document Reviewed: 06/02/2021  [employer] Patient Education © 2023 [employer]        Orthostatic Hypotension  Blood pressure is a measurement of how strongly, or weakly, your circulating blood is pressing against the walls of your arteries. Orthostatic hypotension is a drop in blood pressure that can happen when you change positions, such as when you go from lying down to standing.  Arteries are blood vessels that carry blood from your heart throughout your body. When blood pressure is too low, you may not get enough blood to your brain or to the rest of your organs. Orthostatic hypotension can cause light-headedness, sweating, rapid heartbeat, blurred vision, and fainting. These symptoms require further investigation into the cause.  What are the causes?  Orthostatic hypotension can be caused by many things, including:  Sudden changes in posture, such as standing up quickly after you have been sitting or lying down.  Loss of blood (anemia) or loss of body fluids (dehydration).  Heart problems, neurologic problems, or hormone problems.  Pregnancy.  Aging. The risk for this condition increases as you get older.  Severe infection (sepsis).  Certain medicines, such as medicines for high blood pressure or medicines that make the body lose excess fluids (diuretics).  What are the signs or symptoms?  Symptoms of this condition may include:  Weakness, light-headedness, or dizziness.  Sweating.  Blurred  "vision.  Tiredness (fatigue).  Rapid heartbeat.  Fainting, in severe cases.  How is this diagnosed?  This condition is diagnosed based on:  Your symptoms and medical history.  Your blood pressure measurements. Your health care provider will check your blood pressure when you are:  Lying down.  Sitting.  Standing.  A blood pressure reading is recorded as two numbers, such as \"120 over 80\" (or 120/80). The first (\"top\") number is called the systolic pressure. It is a measure of the pressure in your arteries as your heart beats. The second (\"bottom\") number is called the diastolic pressure. It is a measure of the pressure in your arteries when your heart relaxes between beats. Blood pressure is measured in a unit called mmHg. Healthy blood pressure for most adults is 120/80 mmHg. Orthostatic hypotension is defined as a 20 mmHg drop in systolic pressure or a 10 mmHg drop in diastolic pressure within 3 minutes of standing.  Other information or tests that may be used to diagnose orthostatic hypotension include:  Your other vital signs, such as your heart rate and temperature.  Blood tests.  An electrocardiogram (ECG) or echocardiogram.  A Holter monitor. This is a device you wear that records your heart rhythm continuously, usually for 24-48 hours.  Tilt table test. For this test, you will be safely secured to a table that moves you from a lying position to an upright position. Your heart rhythm and blood pressure will be monitored during the test.  How is this treated?  This condition may be treated by:  Changing your diet. This may involve eating more salt (sodium) or drinking more water.  Changing the dosage of certain medicines you are taking that might be lowering your blood pressure.  Correcting the underlying reason for the orthostatic hypotension.  Wearing compression stockings.  Taking medicines to raise your blood pressure.  Avoiding actions that trigger symptoms.  Follow these instructions at " "home:  Medicines  Take over-the-counter and prescription medicines only as told by your health care provider.  Follow instructions from your health care provider about changing the dosage of your current medicines, if this applies.  Do not stop or adjust any of your medicines on your own.  Eating and drinking    Drink enough fluid to keep your urine pale yellow.  Eat extra salt only as directed. Do not add extra salt to your diet unless advised by your health care provider.  Eat frequent, small meals.  Avoid standing up suddenly after eating.  General instructions    Get up slowly from lying down or sitting positions. This gives your blood pressure a chance to adjust.  Avoid hot showers and excessive heat as directed by your health care provider.  Engage in regular physical activity as directed by your health care provider.  If you have compression stockings, wear them as told.  Keep all follow-up visits. This is important.  Contact a health care provider if:  You have a fever for more than 2-3 days.  You feel more thirsty than usual.  You feel dizzy or weak.  Get help right away if:  You have chest pain.  You have a fast or irregular heartbeat.  You become sweaty or feel light-headed.  You feel short of breath.  You faint.  You have any symptoms of a stroke. \"BE FAST\" is an easy way to remember the main warning signs of a stroke:  B - Balance. Signs are dizziness, sudden trouble walking, or loss of balance.  E - Eyes. Signs are trouble seeing or a sudden change in vision.  F - Face. Signs are sudden weakness or numbness of the face, or the face or eyelid drooping on one side.  A - Arms. Signs are weakness or numbness in an arm. This happens suddenly and usually on one side of the body.  S - Speech. Signs are sudden trouble speaking, slurred speech, or trouble understanding what people say.  T - Time. Time to call emergency services. Write down what time symptoms started.  You have other signs of a stroke, such " as:  A sudden, severe headache with no known cause.  Nausea or vomiting.  Seizure.  These symptoms may represent a serious problem that is an emergency. Do not wait to see if the symptoms will go away. Get medical help right away. Call your local emergency services (911 in the [place] Do not drive yourself to the hospital.  Summary  Orthostatic hypotension is a sudden drop in blood pressure.  It can cause light-headedness, sweating, rapid heartbeat, blurred vision, and fainting.  Orthostatic hypotension can be diagnosed by having your blood pressure taken while lying down, sitting, and then standing.  Treatment may involve changing your diet, wearing compression stockings, sitting up slowly, adjusting your medicines, or correcting the underlying reason for the orthostatic hypotension.  Get help right away if you have chest pain, a fast or irregular heartbeat, or symptoms of a stroke.  This information is not intended to replace advice given to you by your health care provider. Make sure you discuss any questions you have with your health care provider.  Document Revised: 03/03/2022 Document Reviewed: 03/03/2022  [employer] Patient Education © 2023 [employer]      Physical Therapy Discharge Instructions for Gali Broderick    8/6/2024    Weight Bearing Status - Patient Should: Bear Weight as Tolerated Left Leg, Bear No Weight Right Arm  Level of Assist Required for Ambulation: Supervision on Flat Surfaces, Should Not Attempt Stairs at This Time, Supervision on Curbs  Distance Patient May Ambulate: as much as tolerated  Device Recommended for Ambulation: Jelani-Walker  Level of Assist Required to Propel Wheelchair: Supervision  Level of Assist Required for Transfers: Supervision  Device Recommended for Transfers: Jelani-Walker  Home Exercise Program: Refer to Home Exercise Program Handout for Details  Prosthesis / Orthosis Recommendation / Location: No Prosthesis  or Orthosis Recommended    It was so great working with  you and getting to know you! Thanks for working hard -Tod

## 2024-08-06 NOTE — CARE PLAN
The patient is Watcher - Medium risk of patient condition declining or worsening    Shift Goals  Clinical Goals: Safety    Problem: Bowel Elimination  Goal: Establish and maintain regular bowel function  Outcome: Progressing     Problem: Pain - Standard  Goal: Alleviation of pain or a reduction in pain to the patient’s comfort goal  Note: Patient has been experiencing RUE and LLE pain, administered scheduled oxycodone and tylenol.

## 2024-08-06 NOTE — CARE PLAN
"  Problem: Knowledge Deficit - Standard  Goal: Patient and family/care givers will demonstrate understanding of plan of care, disease process/condition, diagnostic tests and medications  Outcome: Progressing  Note: Pt agrees with plan of care tonight regarding medications and safety.  Will continue to monitor patient.     Problem: Pain - Standard  Goal: Alleviation of pain or a reduction in pain to the patient’s comfort goal  Outcome: Progressing  Note: C/o pain to L knee and RUE, medicated with scheduled Tylenol and prn Oxycodone.  Has + relief.  See MAR and doc flow sheet.  Will continue to monitor patient.     Problem: Fall Risk - Rehab  Goal: Patient will remain free from falls  Outcome: Progressing  Note: Fifi Evangelista Fall risk Assessment Score: 13      Moderate fall risk Interventions  - Bed and strip alarm   - Yellow sign by the door   - Yellow wrist band \"Fall risk\"  - Room near to the nurse station  - Do not leave patient unattended in the bathroom  - Fall risk education provided        The patient is Stable - Low risk of patient condition declining or worsening    Shift Goals  Clinical Goals: Safety  Patient Goals: Sleep well  Family Goals: Support, POC updates    Progress made toward(s) clinical / shift goals:  progressing          "

## 2024-08-06 NOTE — THERAPY
Physical Therapy   Discharge Summary     Patient Name: Gali Broderick  Age:  79 y.o., Sex:  female  Medical Record #: [id number]  Today's Date: 8/6/2024     Precautions  Precautions: Weight Bearing As Tolerated Left Lower Extremity, Non Weight Bearing Right Upper Extremity  Comments: Orthostatic hypotension    Subjective    Patient was found in bed and was agreeable to therapy. Patient said it helped her walk to begin with ice massage on L knee.      Objective       08/06/24 1231   PT Charge Group   PT Gait Training (Units) 2   PT Therapeutic Exercise (Units) 1   PT Therapeutic Activities (Units) 1   PT Total Time Spent   PT Individual Total Time Spent (Mins) 60   Precautions   Precautions Weight Bearing As Tolerated Left Lower Extremity;Non Weight Bearing Right Upper Extremity   Pain 0 - 10 Group   Location Knee   Location Orientation Left   Pain Rating Scale (NPRS) 6   Description Aching;Sore   Comfort Goal Comfort with Movement   Therapist Pain Assessment Prior to Activity;During Activity;Post Activity  (increased with activity, used ice pack during nustep and began treatment with ice massage to decrease pain)   Gait Functional Level of Assist    Gait Level Of Assist Contact Guard Assist   Assistive Device Jelani-Walker   Distance (Feet) 12   # of Times Distance was Traveled 3   Deviation Step To;Antalgic;Decreased Base Of Support   Wheelchair Functional Level of Assist   Wheelchair Assist Modified Independent   Distance Wheelchair (Feet or Distance) 100   Wheelchair Description Extra time;Supervision for safety   Stairs Functional Level of Assist   Level of Assist with Stairs Contact Guard Assist   # of Stairs Climbed 1  (2 in curb)   Stairs Description Assist device/equipment;Supervision for safety;Verbal cueing  (hemiwalker)   Transfer Functional Level of Assist   Bed, Chair, Wheelchair Transfer Supervised   Bed Chair Wheelchair Transfer Description Increased time;Initial preparation for task   Sitting Lower Body  Exercises   Tricep Press 2 sets of 10;Left  (with rickshaw, first set 10lbs, second set 15lbs)   Bicep Curls Medium Resistance Theraband;1 set of 10;Left   Hip Abduction 1 set of 10;Bilateral;Medium Resistance Theraband   Hip Adduction 1 set of 10  (ball squeeze)   Long Arc Quad 2 sets of 10  (2 1/2 lbs on R 2 lbs on left)   Marching Reciprocal;2 sets of 10   Hamstring Curl 1 set of 10;Bilateral;Medium Resistance Theraband   Nustep Resistance Level 2  (5 min level 2, 5 min level 0 due to pain)   Bed Mobility    Supine to Sit Independent   Sit to Supine Independent   Sit to Stand Supervised   Interdisciplinary Plan of Care Collaboration   Patient Position at End of Therapy In Bed;Call Light within Reach;Tray Table within Reach;Phone within Reach   Roll Left and Right   Assistance Needed Independent   Physical Assistance Level No physical assistance   CARE Score - Roll Left and Right 6   Sit to Lying   Assistance Needed Independent   Physical Assistance Level No physical assistance   CARE Score - Sit to Lying 6   Lying to Sitting on Side of Bed   Assistance Needed Independent   Physical Assistance Level No physical assistance   CARE Score - Lying to Sitting on Side of Bed 6   Sit to Stand   Assistance Needed Supervision;Adaptive equipment  (hemiwalker)   Physical Assistance Level No physical assistance   CARE Score - Sit to Stand 4   Chair/Bed-to-Chair Transfer   Assistance Needed Adaptive equipment;Supervision  (hemiwalker)   CARE Score - Chair/Bed-to-Chair Transfer 4   Toilet Transfer   Assistance Needed Adaptive equipment;Supervision  (hemiwalker and grab bars)   CARE Score - Toilet Transfer 4   Car Transfer   Assistance Needed Adaptive equipment;Supervision  (hemiwalker)   Physical Assistance Level No physical assistance   CARE Score - Car Transfer 4   Walk 10 Feet   Assistance Needed Supervision;Adaptive equipment  (hemiwalker)   Physical Assistance Level No physical assistance   CARE Score - Walk 10 Feet 4    Walk 50 Feet with Two Turns   Reason if not Attempted Safety concerns   CARE Score - Walk 50 Feet with Two Turns 88   Walk 150 Feet   Reason if not Attempted Safety concerns   CARE Score - Walk 150 Feet 88   Walking 10 Feet on Uneven Surfaces   Assistance Needed Supervision;Adaptive equipment  (hemiwalker)   CARE Score - Walking 10 Feet on Uneven Surfaces 4   1 Step (Curb)   Assistance Needed Supervision;Adaptive equipment  (hemiwalker)   Physical Assistance Level No physical assistance   Comment 2 in curb   CARE Score - 1 Step (Curb) 4   4 Steps   Reason if not Attempted Safety concerns   CARE Score - 4 Steps 88   12 Steps   Reason if not Attempted Safety concerns   CARE Score - 12 Steps 88   Picking Up Object   Assistance Needed Supervision   Physical Assistance Level No physical assistance   CARE Score - Picking Up Object 4   Wheel 50 Feet with Two Turns   Assistance Needed Supervision   Physical Assistance Level No physical assistance   CARE Score - Wheel 50 Feet with Two Turns 4   Wheel 150 Feet   Assistance Needed Supervision   Physical Assistance Level No physical assistance   CARE Score - Wheel 150 Feet 4   P.T. Discharge Summary   Discharge Location Home   Patient Discharging with Assist of Family;Spouse / Significant Other   Level of Supervision Required Upon Discharge Intermittent Supervision   Recommended Equipment for Discharge Jelani-Walker;Ramp;Grab Bars by Toilet;Grab Bars in Tub / Shower   Recommeded Services Upon Discharge Outpatient Physical Therapy   Long Term Goals Met 2   Long Term Goals Not Met 2   Reason(s) for Goals Not Met Did not prioritize caputo balance scale, patient's walking was limited by pain   Criteria for Termination of Services Maximum Function Achieved for Inpatient Rehabilitation   Discharge Instructions to Patient   Weight Bearing Status - Patient Should Bear Weight as Tolerated Left Leg;Bear No Weight Right Arm   Level of Assist Required for Ambulation Supervision on Flat  Surfaces;Should Not Attempt Stairs at This Time;Supervision on Curbs   Distance Patient May Ambulate as much as tolerated   Device Recommended for Ambulation Jelani-Walker   Level of Assist Required to Propel Wheelchair Supervision   Level of Assist Required for Transfers Supervision   Device Recommended for Transfers Jelani-Walker   Home Exercise Program Refer to Home Exercise Program Handout for Details   Prosthesis / Orthosis Recommendation / Location No Prosthesis  or Orthosis Recommended     Began treatment with 5 min ice massage on L knee in order to decrease pain and help patient tolerate walking.   Assessment    Patient can safely ambulate short distances on her own but requires supervision. Patient is safer now with transfers in and out of her car. She is safe to go home but should continue to have supervision during gait and transfers due to her limitations from pain. Patient will benefit from further therapy in order to increase strength and endurance and to help her get back to walking longer distances.     Strengths: Able to follow instructions, Alert and oriented, Effective communication skills, Independent prior level of function, Motivated for self care and independence, Pleasant and cooperative, Willingly participates in therapeutic activities, Supportive family  Barriers: Decreased endurance, Fatigue, Generalized weakness, Impaired activity tolerance, Impaired balance, Limited mobility, Pain, Impulsive    Plan    Discharge home with outpatient therapy.     Passport items to be completed:    Physical Therapy Problems (Active)       There are no active problems.

## 2024-08-06 NOTE — DISCHARGE PLANNING
CM//Discharge :    DME:  Preferred  Homecare                     Following equipment has been ordered: Jelani walker  Status: Delivered

## 2024-08-06 NOTE — THERAPY
Occupational Therapy   Discharge Summary     Patient Name: Gali Broderick  Age:  79 y.o., Sex:  female  Medical Record #: [id number]  Today's Date: 8/6/2024     Precautions  Precautions: Weight Bearing As Tolerated Left Lower Extremity, Non Weight Bearing Right Upper Extremity  Comments: Orthostatic hypotension         Subjective    Pt excited to go home tomorrow.      Objective       08/06/24 0931   OT Charge Group   OT Self Care / ADL (Units) 1   OT Therapy Activity (Units) 1   OT Therapeutic Exercise (Units) 2   OT Total Time Spent   OT Individual Total Time Spent (Mins) 60   Precautions   Precautions Weight Bearing As Tolerated Left Lower Extremity;Non Weight Bearing Right Upper Extremity   Pain 0 - 10 Group   Location Arm   Location Orientation Right   Therapist Pain Assessment Prior to Activity   Functional Level of Assist   Grooming Modified Independent;Seated   Bed, Chair, Wheelchair Transfer Supervised   Bed Chair Wheelchair Transfer Description Supervision for safety   Toilet Transfers Modified Independent   Toilet Transfer Description Grab bar   Sitting Upper Body Exercises   Chest Press 3 sets of 10;Left  (blue therapy band)   Lat Pull 3 sets of 10;Left  (25lb equalizer)   Bicep Curls 3 sets of 10;Left  (20lb equalizer)   Tricep Press 3 sets of 15;Left  (10lb rickshaw)   Upper Extremity Bike Level 3 Resistance  (10 mins motomed)   Balance   Sitting Balance (Static) Normal   Sitting Balance (Dynamic) Good   Bed Mobility    Supine to Sit Modified Independent   Sit to Supine Modified Independent   Scooting Modified Independent   Rolling Modified Independent   Eating   Assistance Needed Independent   CARE Score - Eating 6   Oral Hygiene   Assistance Needed Independent   CARE Score - Oral Hygiene 6   Toileting Hygiene   Assistance Needed Independent   CARE Score - Toileting Hygiene 6   Shower/Bathe Self   Assistance Needed Set-up / clean-up   CARE Score - Shower/Bathe Self 5   Upper Body Dressing   Assistance  "Needed Physical assistance   Physical Assistance Level 25% or less   CARE Score - Upper Body Dressing 3   Lower Body Dressing   Assistance Needed Incidental touching   CARE Score - Lower Body Dressing 4   Putting On/Taking Off Footwear   Assistance Needed Physical assistance   Physical Assistance Level 26%-50%   CARE Score - Putting On/Taking Off Footwear 3   Toilet Transfer   Assistance Needed Independent   CARE Score - Toilet Transfer 6   Cognitive Pattern Assessment   Cognitive Pattern Assessment Used BIMS   Brief Interview for Mental Status (BIMS)   Repetition of Three Words (First Attempt) 3   Temporal Orientation: Year Correct   Temporal Orientation: Month Accurate within 5 days   Temporal Orientation: Day Correct   Recall: \"Sock\" Yes, no cue required   Recall: \"Blue\" Yes, no cue required   Recall: \"Bed\" Yes, no cue required   BIMS Summary Score 15   Confusion Assessment Method (CAM)   Is there evidence of an acute change in mental status from the patient's baseline? No   Inattention Behavior not present   Disorganized thinking Behavior not present   Altered level of consciousness Behavior not present   Discharge Summary    Discharge Location  Home   Patient Discharging with Assist of Family ;Spouse / Significant Other   Level of Supervision Required Intermittent Supervision   Recommended Equipment for Discharge Jelani-Walker;Tub Transfer Bench;Raised Toilet Seat with Arms;Hand Held Shower Head;Grab Bars by Toilet;Grab Bars in Tub / Shower;Reacher   Recommended Services Upon Discharge No Follow-Up Occupational Therapy Recommended   Long Term Goals Met 1   Long Term Goals Not Met 1   Reason(s) for Goals Not Met still needing physical assist with dressing   Criteria for Termination of Services Maximum Function Achieved for Inpatient Rehabilitation   Discharge Instructions to Patient   Level of Assist Required for Eating Able to Complete Eating without Assist   Level of Assist Required for Grooming Able to Complete " Grooming without Assist   Level of Assist Required for Dressing Requires Physical Assist with Dressing   Level of Assist Required for Toileting Able to Complete Toileting without Assist   Level of Assist Required for Toilet Transfer Able to Complete Toilet Transfer without Assist   Equipment for Toilet Transfer Grab Bars by Toilet;Raised Toilet Seat with Arms   Level of Assist Required for Bathing Requires Supervision with Bathing   Equipment for Bathing Tub Transfer Bench;Hand Held Shower Head;Long Handled Sponge;Grab Bars in Tub / Shower   Level of Assist Required for Shower Transfer Requires Supervision with Shower Transfer   Equipment for Shower Transfer Tub Transfer Bench;Grab Bars in Tub / Shower   Level of Assist Required for Home Mgmt Requires Physical Assist with Home Management   Level of Assist Required for Meal Prep Requires Physical Assist with Meal Preparation   Driving May not Drive, Please Contact Physician for Further Information   Home Exercise Program Refer to Home Exercise Program Handout for Details       Assessment    Pt seen for OT tx session. Pt is in good position to DC home with assist from her family. Anticipate start of outpatient PT services to progress overall mobility and R shoulder rehab. Pt has no more OT needs at this time.   Strengths: Able to follow instructions, Alert and oriented, Effective communication skills, Independent prior level of function, Manages pain appropriately, Motivated for self care and independence, Pleasant and cooperative, Supportive family, Willingly participates in therapeutic activities  Barriers: Decreased endurance, Generalized weakness, Impaired balance, Limited mobility    Plan    DE home    Passport items to be completed:  Complete.    Occupational Therapy Goals (Active)       Problem: OT Long Term Goals       Dates: Start:  07/25/24         Goal: LTG-By discharge, patient will complete basic self care tasks at mod I level using LRAD as needed        Dates: Start:  07/25/24    Expected End:  08/07/24

## 2024-08-06 NOTE — PROGRESS NOTES
Received bedside shift report from Lesly AGUILLON RN regarding patient and assumed care. Patient awake, calm and stable, currently positioned in bed for comfort and safety; call light within reach. Denies pain or discomfort at this time. Will continue to monitor.

## 2024-08-06 NOTE — PROGRESS NOTES
NURSING DAILY NOTE    Name: Gail Broderick   Date of Admission: 7/23/2024   Admitting Diagnosis: Multiple trauma  Attending Physician: Elias Welch D.o.  Allergies: Iodine    Safety  Patient Assist  SBA to CGA  Patient Precautions  Weight Bearing As Tolerated Left Lower Extremity, Non Weight Bearing Right Upper Extremity  Precaution Comments  Orthostatic hypotension  Bed Transfer Status  Standby Assist  Toilet Transfer Status   Standby Assist  Assistive Devices  Rails, Wheelchair  Oxygen  None - Room Air  Diet/Therapeutic Dining  Current Diet Order   Procedures    Diet Order Diet: Regular     Pill Administration  whole  Agitated Behavioral Scale     ABS Level of Severity       Fall Risk  Has the patient had a fall this admission?   No  Fifi Evangelista Fall Risk Scoring  13, MODERATE RISK  Fall Risk Safety Measures  bed alarm    Vitals  Temperature: 36.6 °C (97.9 °F)  Temp src: Oral  Pulse: 96  Respiration: 19  Blood Pressure : (!) 144/74  Blood Pressure MAP (Calculated): 97 MM HG  BP Location: Left, Upper Arm  Patient BP Position: Supine     Oxygen  Pulse Oximetry: 92 %  O2 (LPM): 0  O2 Delivery Device: None - Room Air    Bowel and Bladder  Last Bowel Movement  08/04/24  Stool Type  Type 4: Like a sausage or snake, smooth and soft  Bowel Device  Bathroom, Other (Comment) (Bowel Meds)  Continent  Bladder: Did not void   Bowel: No movement  Bladder Function  Urine Void (mL):  (Large)  Number of Times Voided: 1  Urinary Options: Yes  Urine Color: Yellow  Genitourinary Assessment   Bladder Assessment (WDL):  Within Defined Limits  Ariza Catheter: Not Applicable  Urine Color: Yellow  Bladder Device: Bathroom  Bladder Scan: Post Void  $ Bladder Scan Results (mL): 6    Skin  Sascha Score   17  Sensory Interventions   Bed Types: Standard/Trauma Mattress with Overlay  Skin Preventative Measures: Pillows in Use for Support / Positioning, Waffle Overlay  Moisture  Interventions  Moisturizers/Barriers: Barrier Paste, Barrier Wipes      Pain  Pain Rating Scale  4 - Distracts me, can do usual activities  Pain Location  Knee  Pain Location Orientation  Left  Pain Interventions   Medication (see MAR)    ADLs    Bathing   Shower, Staff (OT)  Linen Change   Partial  Personal Hygiene  Moist Estefany Wipes  Chlorhexidine Bath      Oral Care  Brushed Teeth  Teeth/Dentures     Shave     Nutrition Percentage Eaten  Breakfast, Between 50-75% Consumed  Environmental Precautions  Treaded Slipper Socks on Patient, Bed in Low Position  Patient Turns/Positioning  Patient Turns Self from Side to Side  Patient Turns Assistance/Tolerance  Assistance of One, General Weakness  Bed Positions  Bed Locked, Bed Controls On  Head of Bed Elevated  Self regulated      Psychosocial/Neurologic Assessment  Psychosocial Assessment  Psychosocial (WDL):  Within Defined Limits  Neurologic Assessment  Neuro (WDL): Within Defined Limits  Level of Consciousness: Alert  Orientation Level: Oriented X4  Cognition: Follows commands, Appropriate attention/concentration  Speech: Clear  Motor Function/Sensation Assessment: Sensation  RUE Sensation: Tingling, Pain  LLE Sensation: Pain, Tingling  EENT (WDL):  WDL Except    Cardio/Pulmonary Assessment  Edema   RUE Edema: Generalized  RLE Edema: Generalized  LLE Edema: Generalized  Respiratory Breath Sounds  RUL Breath Sounds: Clear  RML Breath Sounds: Clear  RLL Breath Sounds: Clear  BERTA Breath Sounds: Clear  LLL Breath Sounds: Clear  Cardiac Assessment   Cardiac (WDL):  WDL Except

## 2024-08-06 NOTE — PROGRESS NOTES
Received bedside shift report from Elsa MULLER RN regarding patient and assumed care. Patient awake, calm and stable, currently positioned in bed for comfort and safety; call light within reach. Denies pain or discomfort at this time. Will continue to monitor.

## 2024-08-06 NOTE — CARE PLAN
Problem: Mobility  Goal: STG-Within one week, patient will ambulate 10 ft using hemiwalker with contact guard assist.   Outcome: Met  Goal: STG-Within one week, patient will ambulate up/down a curb using hemiwalker and contact guard assist.   Outcome: Met     Problem: Mobility Transfers  Goal: STG-Within one week, patient will transfer bed to chair with stand pivot transfer safely with supervision assist.   Outcome: Met     Problem: PT-Long Term Goals  Goal: LTG-By discharge, patient will perform home exercise program using handouts independently.   Outcome: Met  Goal: LTG-By discharge, patient will transfer in/out of a car using hemiwalker with supervision assist.   Outcome: Met     Problem: Balance  Goal: STG-Within one week, patient will tolerate caputo balance test.  Outcome: Discharged - Not Met     Problem: PT-Long Term Goals  Goal: LTG-By discharge, patient will receive >35/56 on caputo balance test.   Outcome: Discharged - Not Met  Goal: LTG-By discharge, patient will ambulate 100 ft using hemiwalker or LRAD with supervision assist.   Outcome: Discharged - Not Met

## 2024-08-07 VITALS
HEART RATE: 95 BPM | HEIGHT: 64 IN | WEIGHT: 168.65 LBS | SYSTOLIC BLOOD PRESSURE: 136 MMHG | TEMPERATURE: 97.7 F | RESPIRATION RATE: 18 BRPM | OXYGEN SATURATION: 97 % | DIASTOLIC BLOOD PRESSURE: 72 MMHG | BODY MASS INDEX: 28.79 KG/M2

## 2024-08-07 PROCEDURE — A9270 NON-COVERED ITEM OR SERVICE: HCPCS | Performed by: PHYSICAL MEDICINE & REHABILITATION

## 2024-08-07 PROCEDURE — 700102 HCHG RX REV CODE 250 W/ 637 OVERRIDE(OP): Performed by: PHYSICAL MEDICINE & REHABILITATION

## 2024-08-07 RX ADMIN — OXYCODONE HYDROCHLORIDE 5 MG: 5 TABLET ORAL at 06:29

## 2024-08-07 RX ADMIN — ACETAMINOPHEN 1000 MG: 500 TABLET, FILM COATED ORAL at 09:46

## 2024-08-07 RX ADMIN — OMEPRAZOLE 20 MG: 20 CAPSULE, DELAYED RELEASE ORAL at 09:47

## 2024-08-07 ASSESSMENT — PAIN DESCRIPTION - PAIN TYPE
TYPE: ACUTE PAIN

## 2024-08-07 NOTE — PROGRESS NOTES
Patient discharged to home per order.  Discharge instructions reviewed with patient and spouse; they verbalize understanding and signed copies placed in chart.  Patient has all belongings; signed copy of form in chart.  Patient left facility at 1100 via wc to private vehicle accompanied by rehab staff and spouse.  Have enjoyed working with this pleasant patient.

## 2024-08-07 NOTE — CARE PLAN
Problem: Knowledge Deficit - Standard  Goal: Patient and family/care givers will demonstrate understanding of plan of care, disease process/condition, diagnostic tests and medications  Outcome: Progressing   Pt education given regarding plan of care with emphasis on adequate hydration, pt shows good understanding with moderate to poor follow through, will continue to reinforce education and continue to monitor.         Problem: Fall Risk - Rehab  Goal: Patient will remain free from falls  Outcome: Progressing   Pt education given regarding fall precautions AND safety measures, pt shows good understanding, has not attempted to self transfer this shift, will continue to reinforce education and continue to monitor.

## 2024-08-07 NOTE — PROGRESS NOTES
This patient, and spouse with patient permission, received individualized medication counseling regarding the current regimen they are receiving in this facility. Potential adverse effects, monitoring parameters, and proper administration were covered in preparation for their discharge. The patient will likely be taking pain medications upon discharge. The patient was warned regarding sedation, impairment, and constipation as side effects to such regimens. Further warning regarding operating motor vehicles, or dangerous equipment was delivered as well. The patient asked relevant questions regarding the medications for which answers were provided. Our pharmacy hours and phone number were provided for follow up questions should they arise.  Олег Ellsworth  [facility]

## 2024-08-07 NOTE — PROGRESS NOTES
NURSING DAILY NOTE    Name: Gali Broderick   Date of Admission: 7/23/2024   Admitting Diagnosis: Multiple trauma  Attending Physician: Derik Hays M.d.  Allergies: Iodine    Safety  Patient Assist  sba to cga  Patient Precautions  Weight Bearing As Tolerated Left Lower Extremity, Non Weight Bearing Right Upper Extremity  Precaution Comments  Orthostatic hypotension  Bed Transfer Status  Supervised  Toilet Transfer Status   Modified Independent  Assistive Devices  Wheelchair  Oxygen  None - Room Air  Diet/Therapeutic Dining  Current Diet Order   Procedures    Diet Order Diet: Regular     Pill Administration  whole  Agitated Behavioral Scale     ABS Level of Severity       Fall Risk  Has the patient had a fall this admission?   No  Fifi Evangelista Fall Risk Scoring  13, MODERATE RISK  Fall Risk Safety Measures  bed alarm and poor balance    Vitals  Temperature: 36.5 °C (97.7 °F)  Temp src: Oral  Pulse: 95  Respiration: 18  Blood Pressure : 136/72  Blood Pressure MAP (Calculated): 93 MM HG  BP Location: Left, Upper Arm  Patient BP Position: Supine     Oxygen  Pulse Oximetry: 97 %  O2 (LPM): 0  O2 Delivery Device: None - Room Air    Bowel and Bladder  Last Bowel Movement  08/07/24  Stool Type  Type 5: Soft blob with clear cut edges (passed easily)  Bowel Device  Bathroom  Continent  Bladder: Did not void   Bowel: No movement  Bladder Function  Urine Void (mL):  (Large)  Number of Times Voided: 1  Urinary Options: Yes  Urine Color: Yellow  Genitourinary Assessment   Bladder Assessment (WDL):  Within Defined Limits  Ariza Catheter: Not Applicable  Urine Color: Yellow  Bladder Device: Bathroom  Bladder Scan: Post Void  $ Bladder Scan Results (mL): 6    Skin  Sascha Score   18  Sensory Interventions   Bed Types: Standard/Trauma Mattress with Overlay  Skin Preventative Measures: Pillows in Use for Support / Positioning, Waffle Overlay  Moisture  Interventions  Moisturizers/Barriers: Barrier Paste, Barrier Wipes      Pain  Pain Rating Scale  3 - Sometimes distracts me  Pain Location  Knee, Shoulder  Pain Location Orientation  Right, Left  Pain Interventions   Rest    ADLs    Bathing   Shower, Staff (OT)  Linen Change   Partial  Personal Hygiene  Moist Estefany Wipes  Chlorhexidine Bath      Oral Care  Brushed Teeth  Teeth/Dentures     Shave     Nutrition Percentage Eaten  *  * Meal *  *, Breakfast, Between 25-50% Consumed  Environmental Precautions  Treaded Slipper Socks on Patient, Bed in Low Position  Patient Turns/Positioning  Patient Turns Self from Side to Side  Patient Turns Assistance/Tolerance  Assistance of One  Bed Positions  Bed Controls On, Bed Locked  Head of Bed Elevated  Self regulated      Psychosocial/Neurologic Assessment  Psychosocial Assessment  Psychosocial (WDL):  Within Defined Limits  Neurologic Assessment  Neuro (WDL): Within Defined Limits  Level of Consciousness: Alert  Orientation Level: Oriented X4  Cognition: Follows commands, Appropriate attention/concentration  Speech: Clear  Motor Function/Sensation Assessment: Sensation  RUE Sensation: Tingling, Pain  Muscle Strength Right Arm: Weak Movement but Not Against Gravity or Resistance  Muscle Strength Left Arm: Good Strength Against Gravity and Moderate Resistance  Muscle Strength Right Leg: Good Strength Against Gravity and Moderate Resistance  LLE Sensation: Pain, Tingling  Muscle Strength Left Leg: Fair Strength against Gravity but No Resistance  EENT (WDL):  WDL Except    Cardio/Pulmonary Assessment  Edema   RUE Edema: Generalized  RLE Edema: Generalized  LLE Edema: Generalized  Respiratory Breath Sounds  RUL Breath Sounds: Clear  RML Breath Sounds: Clear  RLL Breath Sounds: Clear  BERTA Breath Sounds: Clear  LLL Breath Sounds: Clear  Cardiac Assessment   Cardiac (WDL):  WDL Except (H/O HTN.)

## 2024-08-07 NOTE — PROGRESS NOTES
NURSING DAILY NOTE    Name: Gali Broderick   Date of Admission: 7/23/2024   Admitting Diagnosis: Multiple trauma  Attending Physician: Derik Hays M.d.  Allergies: Iodine    Safety  Patient Assist  SBA to CGA  Patient Precautions  Weight Bearing As Tolerated Left Lower Extremity, Non Weight Bearing Right Upper Extremity  Precaution Comments  Orthostatic hypotension  Bed Transfer Status  Supervised  Toilet Transfer Status   Modified Independent  Assistive Devices  Wheelchair  Oxygen  None - Room Air  Diet/Therapeutic Dining  Current Diet Order   Procedures    Diet Order Diet: Regular     Pill Administration  whole  Agitated Behavioral Scale     ABS Level of Severity       Fall Risk  Has the patient had a fall this admission?   No  Fifi Evangelista Fall Risk Scoring  13, MODERATE RISK  Fall Risk Safety Measures  Bed strip alarm    Vitals  Temperature: 36.4 °C (97.6 °F)  Temp src: Oral  Pulse: 94  Respiration: 19  Blood Pressure : (!) 144/72  Blood Pressure MAP (Calculated): 96 MM HG  BP Location: Left, Upper Arm  Patient BP Position: Supine     Oxygen  Pulse Oximetry: 92 %  O2 (LPM): 0  O2 Delivery Device: None - Room Air    Bowel and Bladder  Last Bowel Movement  08/07/24  Stool Type  Type 5: Soft blob with clear cut edges (passed easily)  Bowel Device  Bathroom  Continent  Bladder: Did not void   Bowel: No movement  Bladder Function  Urine Void (mL):  (Large)  Number of Times Voided: 1  Urinary Options: Yes  Urine Color: Unable To Evaluate  Genitourinary Assessment   Bladder Assessment (WDL):  Within Defined Limits  Ariza Catheter: Not Applicable  Urine Color: Unable To Evaluate  Bladder Device: Bathroom  Bladder Scan: Post Void  $ Bladder Scan Results (mL): 6    Skin  Sascha Score   18  Sensory Interventions   Bed Types: Standard/Trauma Mattress with Overlay  Skin Preventative Measures: Pillows in Use for Support / Positioning, Waffle Overlay  Moisture  Interventions  Moisturizers/Barriers: Barrier Paste, Barrier Wipes      Pain  Pain Rating Scale  7 - Focus of attention, prevents doing daily activities  Pain Location  Knee  Pain Location Orientation  Left  Pain Interventions   Rest    ADLs    Bathing   Shower, Staff (OT)  Linen Change   Partial  Personal Hygiene  Moist Estefany Wipes  Chlorhexidine Bath      Oral Care  Brushed Teeth  Teeth/Dentures     Shave     Nutrition Percentage Eaten  Breakfast, Between 50-75% Consumed  Environmental Precautions  Treaded Slipper Socks on Patient, Bed in Low Position  Patient Turns/Positioning  Patient Turns Self from Side to Side  Patient Turns Assistance/Tolerance  Assistance of One, General Weakness  Bed Positions  Bed Controls On, Bed Locked  Head of Bed Elevated  Self regulated      Psychosocial/Neurologic Assessment  Psychosocial Assessment  Psychosocial (WDL):  Within Defined Limits  Neurologic Assessment  Neuro (WDL): Within Defined Limits  Level of Consciousness: Alert  Orientation Level: Oriented X4  Cognition: Follows commands, Appropriate attention/concentration  Speech: Clear  Motor Function/Sensation Assessment: Sensation  RUE Sensation: Tingling, Pain  Muscle Strength Right Arm: Weak Movement but Not Against Gravity or Resistance  Muscle Strength Left Arm: Good Strength Against Gravity and Moderate Resistance  Muscle Strength Right Leg: Good Strength Against Gravity and Moderate Resistance  LLE Sensation: Pain, Tingling  Muscle Strength Left Leg: Fair Strength against Gravity but No Resistance  EENT (WDL):  WDL Except    Cardio/Pulmonary Assessment  Edema   RUE Edema: Generalized  RLE Edema: Generalized  LLE Edema: Generalized  Respiratory Breath Sounds  RUL Breath Sounds: Clear  RML Breath Sounds: Clear  RLL Breath Sounds: Clear  BERTA Breath Sounds: Clear  LLL Breath Sounds: Clear  Cardiac Assessment   Cardiac (WDL):  WDL Except (H/O HTN.)

## 2024-08-14 ENCOUNTER — OFFICE VISIT (OUTPATIENT)
Dept: MEDICAL GROUP | Facility: LAB | Age: 79
End: 2024-08-14
Payer: COMMERCIAL

## 2024-08-14 VITALS
RESPIRATION RATE: 16 BRPM | TEMPERATURE: 98.7 F | SYSTOLIC BLOOD PRESSURE: 118 MMHG | HEART RATE: 108 BPM | DIASTOLIC BLOOD PRESSURE: 70 MMHG | HEIGHT: 64 IN | BODY MASS INDEX: 26.8 KG/M2 | OXYGEN SATURATION: 94 % | WEIGHT: 157 LBS

## 2024-08-14 DIAGNOSIS — R74.01 TRANSAMINITIS: ICD-10-CM

## 2024-08-14 DIAGNOSIS — D64.9 ANEMIA, UNSPECIFIED TYPE: ICD-10-CM

## 2024-08-14 DIAGNOSIS — S82.102A CLOSED FRACTURE OF PROXIMAL END OF LEFT TIBIA, UNSPECIFIED FRACTURE MORPHOLOGY, INITIAL ENCOUNTER: ICD-10-CM

## 2024-08-14 DIAGNOSIS — S42.91XD CLOSED FRACTURE OF RIGHT SHOULDER WITH ROUTINE HEALING, SUBSEQUENT ENCOUNTER: ICD-10-CM

## 2024-08-14 DIAGNOSIS — Z09 HOSPITAL DISCHARGE FOLLOW-UP: ICD-10-CM

## 2024-08-14 PROCEDURE — 3074F SYST BP LT 130 MM HG: CPT | Performed by: NURSE PRACTITIONER

## 2024-08-14 PROCEDURE — 3078F DIAST BP <80 MM HG: CPT | Performed by: NURSE PRACTITIONER

## 2024-08-14 PROCEDURE — 99214 OFFICE O/P EST MOD 30 MIN: CPT | Performed by: NURSE PRACTITIONER

## 2024-08-14 ASSESSMENT — FIBROSIS 4 INDEX: FIB4 SCORE: 2.51

## 2024-08-14 NOTE — PROGRESS NOTES
Verbal consent was acquired by the patient to use [email or web address] ambient listening note generation during this visit Yes      Subjective   Gali Broderick is a 79 y.o. female who presents for hospital follow-up  History of Present Illness  The patient is an 79-year-old established female here for a hospital follow-up.    She was admitted to the hospital on 07/15/2024, then moved to rehab on 07/23/2024, and discharged from rehab on 08/07/2024. The admitting diagnosis was a closed fracture of the right shoulder and proximal end of the left tibia from trauma. She sustained these injuries from a ground-level fall while hiking, tripped over a root. On 07/16/2024, she underwent an ORIF of her left tibial shaft fracture and a closed manipulation of her 3-part proximal humerus fracture performed by Dr. Denney.     Her last labs were done on 08/05/2024, showing H & H levels of 10.3/32.2. A CMP (comprehensive metabolic panel) on 07/30/2024 revealed elevated liver enzymes (65/102) and an ALK phos of 286, with otherwise normal kidney function. Iron levels were within normal limits.    She reports that her incision is healing well and she has been able to walk at home with the aid of a hemiwalker. She is currently on oxycodone, Tylenol, and baby aspirin for pain management. She is not experiencing constipation, nausea, or vomiting. She experienced lightheadedness during her initial physical therapy sessions due to low blood pressure, but this issue has since resolved. She is supposed to start outpatient physical therapy for her arm next week but has not received a call from physical therapy.  She has a follow-up with her orthopedist next week.  She is unable to drive and has been informed that her insurance will not cover home health therapy.    She has no history of liver problems or severe anemia. She is not a vegetarian and consumes one glass of wine daily. She reports no chest pain, breathing difficulties, abdominal issues,  "headaches, or rashes. She monitors her blood pressure at home after breakfast and has noticed that her heart rate has been slightly elevated. She is not experiencing pain at present.    She is accompanied by her  today.    Review of Systems  Negative except for HPI  Objective   /70 (BP Location: Left arm, Patient Position: Sitting, BP Cuff Size: Large adult)   Pulse (!) 108   Temp 37.1 °C (98.7 °F)   Resp 16   Ht 1.626 m (5' 4\")   Wt 71.2 kg (157 lb)   SpO2 94%   Physical Exam  Ears show no wax and eardrum is intact bilaterally.  CV RRR  Resp: CTA bilaterally  Left knee: Very well-healed surgical incision.  Right upper extremity: In a sling.  Hand is well-perfused/pink/warm with radial pulse +2.  Abdomen: Soft and nontender.  General: Well-developed, well-nourished, no apparent distress.        Results  Laboratory Studies  Hemoglobin and Hematocrit (H & H) was 10.3/32.2. Liver enzymes were elevated at 65/102 and Alkaline Phosphatase (ALK phos) was 286. Iron levels were within normal limits.       Assessment & Plan  1.  Hospital follow-up:  Overall doing really well.  Has a scheduled follow-up with orthopedics.  Concerned that she does not have a scheduled PT follow-up.  Urgent referral placed, looks like this was a misunderstanding when discharged from inpatient rehab as her insurance would not cover home health physical therapy.  Her  will let me know when they have a scheduled outpatient PT appointment as she needs to start PT for her right upper extremity.  She is doing some home PT exercises for her left lower extremity.  Her pain is controlled.  Her heart rate is slightly elevated, which may be due to pain or limited mobility in recent weeks or her pain level.  She is in a normal sinus rhythm.  She will let me know if her heart rate at home consistently stays above 100. Blood counts and liver function tests will be rechecked in approximately 6 weeks.    2. Closed fracture of the right " shoulder.  Overall doing really well.  See #1.  Has a scheduled follow-up with her orthopedist.  Awaiting PT.    3. Closed fracture of the proximal end of the left tibia.  She reports improvement in pain, currently rated at 5/10, and is able to walk 30-50 feet multiple times a day using a hemiwalker. She is currently on oxycodone for one more week and takes two Tylenol three times a day, along with baby aspirin in the morning and evening. She plans to discontinue oxycodone after one week and continue with Tylenol as needed.    4. Elevated liver enzymes.  Her CMP on July 30 showed elevated liver enzymes (AST 65, ) and ALK phos of 286. These are likely related to recent surgery and hospitalization. Liver function tests will be rechecked in approximately 6 weeks to ensure normalization.    5. Anemia.  Likely related to blood loss from surgery.  Her H&H on August 5 was 10.3/32.2, likely due to blood loss from surgery. Iron levels are within normal limits. Blood counts will be rechecked in approximately 6 weeks to monitor for improvement.                   Please note that this dictation was created using voice recognition software. I have made every reasonable attempt to correct obvious errors, but I expect that there are errors of grammar and possibly content that I did not discover before finalizing the note.

## 2024-08-17 ENCOUNTER — TELEMEDICINE (OUTPATIENT)
Dept: TELEHEALTH | Facility: TELEMEDICINE | Age: 79
End: 2024-08-17
Payer: COMMERCIAL

## 2024-08-17 VITALS — BODY MASS INDEX: 26.95 KG/M2 | WEIGHT: 157 LBS

## 2024-08-17 DIAGNOSIS — R30.0 DYSURIA: ICD-10-CM

## 2024-08-17 DIAGNOSIS — N30.90 CYSTITIS: ICD-10-CM

## 2024-08-17 LAB
APPEARANCE UR: NORMAL
BILIRUB UR STRIP-MCNC: NORMAL MG/DL
COLOR UR AUTO: YELLOW
GLUCOSE UR STRIP.AUTO-MCNC: NORMAL MG/DL
KETONES UR STRIP.AUTO-MCNC: NORMAL MG/DL
LEUKOCYTE ESTERASE UR QL STRIP.AUTO: NORMAL
NITRITE UR QL STRIP.AUTO: NORMAL
PH UR STRIP.AUTO: 5.5 [PH] (ref 5–8)
PROT UR QL STRIP: 100 MG/DL
RBC UR QL AUTO: NORMAL
SP GR UR STRIP.AUTO: 1.03
UROBILINOGEN UR STRIP-MCNC: 0.2 MG/DL

## 2024-08-17 PROCEDURE — 99213 OFFICE O/P EST LOW 20 MIN: CPT

## 2024-08-17 PROCEDURE — 81002 URINALYSIS NONAUTO W/O SCOPE: CPT

## 2024-08-17 RX ORDER — MIDODRINE HYDROCHLORIDE 5 MG/1
TABLET ORAL
COMMUNITY
Start: 2024-08-16

## 2024-08-17 RX ORDER — SULFAMETHOXAZOLE/TRIMETHOPRIM 800-160 MG
1 TABLET ORAL 2 TIMES DAILY
Qty: 6 TABLET | Refills: 0 | Status: SHIPPED | OUTPATIENT
Start: 2024-08-17 | End: 2024-08-20

## 2024-08-17 ASSESSMENT — FIBROSIS 4 INDEX: FIB4 SCORE: 2.51

## 2024-08-17 NOTE — PROGRESS NOTES
Virtual Visit: Established Patient   This visit was conducted via Teams using secure and encrypted videoconferencing technology.   The patient was in their home in the state of [place].    The patient's identity was confirmed and verbal consent was obtained for this virtual visit.    Subjective:   CC:   Chief Complaint   Patient presents with    UTI     Pt states was in the hospital few weeks ago, stinging sensation, frequency x 1 day      Gali Broderick is a 79 y.o. female presenting for evaluation and management of:    Patient comes in today with complaints of ongoing frequency and urgency for the last 24 hours.  She had a recent fall which caused her to break her leg in her upper arm as well.  She was seen in the hospital and ultimately has been discharged for the last 3 days, she is nonambulatory.  She does have help at home.  She denies any fevers, no nausea or vomiting, no major low back pain.  No recent UTIs.    ROS   See note above.    Current medicines (including changes today)  Current Outpatient Medications   Medication Sig Dispense Refill    sulfamethoxazole-trimethoprim (BACTRIM DS) 800-160 MG tablet Take 1 Tablet by mouth 2 times a day for 3 days. 6 Tablet 0    oxyCODONE immediate-release (ROXICODONE) 5 MG Tab Take 1 Tablet by mouth every 6 hours as needed for Severe Pain for up to 14 days. Wean off. 28 Tablet 0    acetaminophen (TYLENOL) 500 MG Tab Take 2 Tablets by mouth in the morning, at noon, and at bedtime. 30 Tablet 0    aspirin 81 MG EC tablet Take 1 Tablet by mouth 2 times a day. Take for 30 days then off. 60 Tablet 0    midodrine (PROAMATINE) 5 MG Tab  (Patient not taking: Reported on 8/17/2024)      telmisartan (MICARDIS) 40 MG Tab  (Patient not taking: Reported on 8/17/2024)       No current facility-administered medications for this visit.       Patient Active Problem List    Diagnosis Date Noted    Orthostatic dizziness 08/01/2024    Multiple trauma 07/23/2024    Advance care planning  07/17/2024    Encounter for perioperative consultation 07/16/2024    Closed fracture of proximal end of left tibia, unspecified fracture morphology, initial encounter 07/15/2024    Closed fracture of right shoulder 07/15/2024    Aortic root dilatation (HCC) 07/14/2022    Essential (primary) hypertension 07/12/2022    Moderate aortic insufficiency 11/29/2018    Chronic pain of both knees 11/29/2018    Other hyperlipidemia 08/29/2016    Renal dysfunction 08/29/2016    Other seasonal allergic rhinitis 05/09/2016        Objective:   Wt 71.2 kg (157 lb)   BMI 26.95 kg/m²     Physical Exam:  Constitutional: Alert, no distress, well-groomed.  Skin: No rashes in visible areas.  Eye: Round. Conjunctiva clear, lids normal. No icterus.   ENMT: Lips pink without lesions, good dentition, moist mucous membranes. Phonation normal.  Neck: No masses, no thyromegaly. Moves freely without pain.  Respiratory: Unlabored respiratory effort, no cough or audible wheeze  Psych: Alert and oriented x3, normal affect and mood.     Assessment and Plan:   The following treatment plan was discussed: Based on physical exam along with the review of systems I did encourage getting a urinalysis, I did place order, patient is nonambulatory due to her recent fractures, she does have help at home, I did advise that her caregiver run the urine into the office however because of her symptoms as well as risk factors related to her age I did go ahead and prescribe antibiotics, patient is to take this with food, drink plenty of fluids, allergies were double checked and verified prior to the prescription of this medication.  Patient advised that should her symptoms continue to get worse or not improved please seek further evaluation in person.    1. Dysuria  - POCT Urinalysis    2. Cystitis  - sulfamethoxazole-trimethoprim (BACTRIM DS) 800-160 MG tablet; Take 1 Tablet by mouth 2 times a day for 3 days.  Dispense: 6 Tablet; Refill: 0    Other orders  -  midodrine (PROAMATINE) 5 MG Tab;  (Patient not taking: Reported on 8/17/2024)      Follow-up: No follow-ups on file.

## 2024-08-19 ENCOUNTER — OFFICE VISIT (OUTPATIENT)
Dept: MEDICAL GROUP | Facility: LAB | Age: 79
End: 2024-08-19
Payer: COMMERCIAL

## 2024-08-19 ENCOUNTER — HOSPITAL ENCOUNTER (OUTPATIENT)
Facility: MEDICAL CENTER | Age: 79
End: 2024-08-19
Attending: NURSE PRACTITIONER
Payer: COMMERCIAL

## 2024-08-19 VITALS
WEIGHT: 156 LBS | BODY MASS INDEX: 26.63 KG/M2 | TEMPERATURE: 98 F | HEIGHT: 64 IN | RESPIRATION RATE: 20 BRPM | DIASTOLIC BLOOD PRESSURE: 60 MMHG | SYSTOLIC BLOOD PRESSURE: 122 MMHG | OXYGEN SATURATION: 96 % | HEART RATE: 100 BPM

## 2024-08-19 DIAGNOSIS — R30.0 DYSURIA: ICD-10-CM

## 2024-08-19 LAB
APPEARANCE UR: CLEAR
BILIRUB UR STRIP-MCNC: NORMAL MG/DL
COLOR UR AUTO: YELLOW
GLUCOSE UR STRIP.AUTO-MCNC: NORMAL MG/DL
KETONES UR STRIP.AUTO-MCNC: NORMAL MG/DL
LEUKOCYTE ESTERASE UR QL STRIP.AUTO: NORMAL
NITRITE UR QL STRIP.AUTO: NORMAL
PH UR STRIP.AUTO: 5 [PH] (ref 5–8)
PROT UR QL STRIP: 30 MG/DL
RBC UR QL AUTO: NORMAL
SP GR UR STRIP.AUTO: 1.03
UROBILINOGEN UR STRIP-MCNC: 0.2 MG/DL

## 2024-08-19 PROCEDURE — 81002 URINALYSIS NONAUTO W/O SCOPE: CPT | Performed by: NURSE PRACTITIONER

## 2024-08-19 PROCEDURE — 87086 URINE CULTURE/COLONY COUNT: CPT

## 2024-08-19 PROCEDURE — 3074F SYST BP LT 130 MM HG: CPT | Performed by: NURSE PRACTITIONER

## 2024-08-19 PROCEDURE — 99213 OFFICE O/P EST LOW 20 MIN: CPT | Performed by: NURSE PRACTITIONER

## 2024-08-19 PROCEDURE — 3078F DIAST BP <80 MM HG: CPT | Performed by: NURSE PRACTITIONER

## 2024-08-19 ASSESSMENT — FIBROSIS 4 INDEX: FIB4 SCORE: 2.51

## 2024-08-19 NOTE — PROGRESS NOTES
"Verbal consent was acquired by the patient to use [facility] ambient listening note generation during this visit Yes      Subjective   Gali Broderick is a 79 y.o. female who presents for urinary f/u:   History of Present Illness  The patient is an 79-year-old established female here to follow up on urinary issues. She is accompanied by her , Jerome.     She experienced a burning sensation during urination last Saturday and scheduled a virtual visit. She did undergo a urinalysis at urgent care and was prescribed bactrim.  She was instructed to take one dose immediately and another at bedtime last saturday. Following this regimen, she began to urinate every 2 hours throughout the night. The next morning (Sunday) , she felt shaky and lightheaded, almost to the point of fainting. Currently, she no longer experiences the stinging sensation and her urine is clear. She has resumed her usual bathroom schedule of every 4 to 5 hours. She has not taken any more of the medication since the initial two doses due to her fear of fainting. She has never taken sulfa drugs before. Her urinary symptoms have completely resolved. She took a total of 2 pills and it has been a full 24 hours since her last dose. She had not started antibiotics when the sample was collected on 08/17/2024. She maintains good hydration and has been drinking cranberry juice since yesterday. She feels fine now and is able to walk around the house.  She was able to shower this morning, but was unable to do so yesterday due to feeling faint. She has no history of urinary tract infections.    ALLERGIES  She is allergic to IODINE.    Review of Systems  Neg except hpi  Objective   /60 (BP Location: Left arm, Patient Position: Sitting, BP Cuff Size: Large adult)   Pulse 100   Temp 36.7 °C (98 °F)   Resp 20   Ht 1.626 m (5' 4\")   Wt 70.8 kg (156 lb)   SpO2 96%   Physical Exam  Gen: NAD  Resp: nonlabored.  Able to speak in full sentences  Psy: " pleasant / cooperative.   Neuro:  Alert and oriented x 3        Assessment & Plan  1. Dysuria:   Symptoms now resolved with only two bactrim pills - taken over 24 hours ago.  Added sulfa drugs to her allergy list.  repeat urinalysis today shows trace blood and small leukocytes.  Culture sent.  Encouraged her to stay well-hydrated and notify me in the interim while awaiting culture results, if any symptoms return.                 Please note that this dictation was created using voice recognition software. I have made every reasonable attempt to correct obvious errors, but I expect that there are errors of grammar and possibly content that I did not discover before finalizing the note.

## 2024-08-22 LAB
BACTERIA UR CULT: NORMAL
SIGNIFICANT IND 70042: NORMAL
SITE SITE: NORMAL
SOURCE SOURCE: NORMAL

## 2024-09-01 ENCOUNTER — HOSPITAL ENCOUNTER (EMERGENCY)
Facility: MEDICAL CENTER | Age: 79
End: 2024-09-01
Attending: EMERGENCY MEDICINE
Payer: COMMERCIAL

## 2024-09-01 ENCOUNTER — APPOINTMENT (OUTPATIENT)
Dept: RADIOLOGY | Facility: MEDICAL CENTER | Age: 79
End: 2024-09-01
Attending: EMERGENCY MEDICINE
Payer: COMMERCIAL

## 2024-09-01 VITALS
DIASTOLIC BLOOD PRESSURE: 78 MMHG | RESPIRATION RATE: 22 BRPM | OXYGEN SATURATION: 96 % | HEART RATE: 96 BPM | BODY MASS INDEX: 26.46 KG/M2 | SYSTOLIC BLOOD PRESSURE: 149 MMHG | HEIGHT: 64 IN | TEMPERATURE: 98 F | WEIGHT: 155 LBS

## 2024-09-01 DIAGNOSIS — Z98.890 POST-OPERATIVE STATE: ICD-10-CM

## 2024-09-01 DIAGNOSIS — R42 LIGHTHEADEDNESS: ICD-10-CM

## 2024-09-01 DIAGNOSIS — R55 NEAR SYNCOPE: ICD-10-CM

## 2024-09-01 LAB
ALBUMIN SERPL BCP-MCNC: 4.2 G/DL (ref 3.2–4.9)
ALBUMIN/GLOB SERPL: 1.3 G/DL
ALP SERPL-CCNC: 158 U/L (ref 30–99)
ALT SERPL-CCNC: 13 U/L (ref 2–50)
ANION GAP SERPL CALC-SCNC: 13 MMOL/L (ref 7–16)
APTT PPP: 29.1 SEC (ref 24.7–36)
AST SERPL-CCNC: 16 U/L (ref 12–45)
BASOPHILS # BLD AUTO: 0.4 % (ref 0–1.8)
BASOPHILS # BLD: 0.03 K/UL (ref 0–0.12)
BILIRUB SERPL-MCNC: 0.3 MG/DL (ref 0.1–1.5)
BUN SERPL-MCNC: 15 MG/DL (ref 8–22)
CALCIUM ALBUM COR SERPL-MCNC: 9.8 MG/DL (ref 8.5–10.5)
CALCIUM SERPL-MCNC: 10 MG/DL (ref 8.4–10.2)
CHLORIDE SERPL-SCNC: 105 MMOL/L (ref 96–112)
CO2 SERPL-SCNC: 22 MMOL/L (ref 20–33)
CREAT SERPL-MCNC: 0.76 MG/DL (ref 0.5–1.4)
D DIMER PPP IA.FEU-MCNC: 1.46 UG/ML (FEU) (ref 0–0.5)
EKG IMPRESSION: NORMAL
EOSINOPHIL # BLD AUTO: 0.04 K/UL (ref 0–0.51)
EOSINOPHIL NFR BLD: 0.5 % (ref 0–6.9)
ERYTHROCYTE [DISTWIDTH] IN BLOOD BY AUTOMATED COUNT: 43.3 FL (ref 35.9–50)
GFR SERPLBLD CREATININE-BSD FMLA CKD-EPI: 79 ML/MIN/1.73 M 2
GLOBULIN SER CALC-MCNC: 3.3 G/DL (ref 1.9–3.5)
GLUCOSE SERPL-MCNC: 116 MG/DL (ref 65–99)
HCT VFR BLD AUTO: 41.1 % (ref 37–47)
HGB BLD-MCNC: 13.7 G/DL (ref 12–16)
IMM GRANULOCYTES # BLD AUTO: 0.02 K/UL (ref 0–0.11)
IMM GRANULOCYTES NFR BLD AUTO: 0.2 % (ref 0–0.9)
INR PPP: 0.93 (ref 0.87–1.13)
LYMPHOCYTES # BLD AUTO: 0.96 K/UL (ref 1–4.8)
LYMPHOCYTES NFR BLD: 11.5 % (ref 22–41)
MAGNESIUM SERPL-MCNC: 2.2 MG/DL (ref 1.5–2.5)
MCH RBC QN AUTO: 31.2 PG (ref 27–33)
MCHC RBC AUTO-ENTMCNC: 33.3 G/DL (ref 32.2–35.5)
MCV RBC AUTO: 93.6 FL (ref 81.4–97.8)
MONOCYTES # BLD AUTO: 0.45 K/UL (ref 0–0.85)
MONOCYTES NFR BLD AUTO: 5.4 % (ref 0–13.4)
NEUTROPHILS # BLD AUTO: 6.82 K/UL (ref 1.82–7.42)
NEUTROPHILS NFR BLD: 82 % (ref 44–72)
NRBC # BLD AUTO: 0 K/UL
NRBC BLD-RTO: 0 /100 WBC (ref 0–0.2)
PLATELET # BLD AUTO: 253 K/UL (ref 164–446)
PMV BLD AUTO: 10.5 FL (ref 9–12.9)
POTASSIUM SERPL-SCNC: 4.2 MMOL/L (ref 3.6–5.5)
PROT SERPL-MCNC: 7.5 G/DL (ref 6–8.2)
PROTHROMBIN TIME: 13 SEC (ref 12–14.6)
RBC # BLD AUTO: 4.39 M/UL (ref 4.2–5.4)
SODIUM SERPL-SCNC: 140 MMOL/L (ref 135–145)
TROPONIN T SERPL-MCNC: 16 NG/L (ref 6–19)
WBC # BLD AUTO: 8.3 K/UL (ref 4.8–10.8)

## 2024-09-01 PROCEDURE — 85025 COMPLETE CBC W/AUTO DIFF WBC: CPT

## 2024-09-01 PROCEDURE — 85730 THROMBOPLASTIN TIME PARTIAL: CPT

## 2024-09-01 PROCEDURE — 36415 COLL VENOUS BLD VENIPUNCTURE: CPT

## 2024-09-01 PROCEDURE — 93005 ELECTROCARDIOGRAM TRACING: CPT

## 2024-09-01 PROCEDURE — 85610 PROTHROMBIN TIME: CPT

## 2024-09-01 PROCEDURE — 85379 FIBRIN DEGRADATION QUANT: CPT

## 2024-09-01 PROCEDURE — 93005 ELECTROCARDIOGRAM TRACING: CPT | Performed by: EMERGENCY MEDICINE

## 2024-09-01 PROCEDURE — 83735 ASSAY OF MAGNESIUM: CPT

## 2024-09-01 PROCEDURE — 94760 N-INVAS EAR/PLS OXIMETRY 1: CPT

## 2024-09-01 PROCEDURE — 700111 HCHG RX REV CODE 636 W/ 250 OVERRIDE (IP): Mod: JZ | Performed by: EMERGENCY MEDICINE

## 2024-09-01 PROCEDURE — 84484 ASSAY OF TROPONIN QUANT: CPT

## 2024-09-01 PROCEDURE — 99285 EMERGENCY DEPT VISIT HI MDM: CPT

## 2024-09-01 PROCEDURE — 700105 HCHG RX REV CODE 258: Performed by: EMERGENCY MEDICINE

## 2024-09-01 PROCEDURE — 96375 TX/PRO/DX INJ NEW DRUG ADDON: CPT

## 2024-09-01 PROCEDURE — 96374 THER/PROPH/DIAG INJ IV PUSH: CPT

## 2024-09-01 PROCEDURE — 71275 CT ANGIOGRAPHY CHEST: CPT

## 2024-09-01 PROCEDURE — 80053 COMPREHEN METABOLIC PANEL: CPT

## 2024-09-01 PROCEDURE — 700117 HCHG RX CONTRAST REV CODE 255: Performed by: EMERGENCY MEDICINE

## 2024-09-01 RX ORDER — METHYLPREDNISOLONE SODIUM SUCCINATE 125 MG/2ML
125 INJECTION, POWDER, LYOPHILIZED, FOR SOLUTION INTRAMUSCULAR; INTRAVENOUS ONCE
Status: COMPLETED | OUTPATIENT
Start: 2024-09-01 | End: 2024-09-01

## 2024-09-01 RX ORDER — FLUTICASONE PROPIONATE 50 MCG
1 SPRAY, SUSPENSION (ML) NASAL 2 TIMES DAILY PRN
COMMUNITY

## 2024-09-01 RX ORDER — DIPHENHYDRAMINE HYDROCHLORIDE 50 MG/ML
25 INJECTION INTRAMUSCULAR; INTRAVENOUS ONCE
Status: COMPLETED | OUTPATIENT
Start: 2024-09-01 | End: 2024-09-01

## 2024-09-01 RX ORDER — SODIUM CHLORIDE 9 MG/ML
500 INJECTION, SOLUTION INTRAVENOUS ONCE
Status: COMPLETED | OUTPATIENT
Start: 2024-09-01 | End: 2024-09-01

## 2024-09-01 RX ORDER — SULFAMETHOXAZOLE/TRIMETHOPRIM 800-160 MG
1 TABLET ORAL 2 TIMES DAILY
COMMUNITY

## 2024-09-01 RX ORDER — OXYCODONE HYDROCHLORIDE 5 MG/1
5 TABLET ORAL EVERY 8 HOURS PRN
COMMUNITY

## 2024-09-01 RX ADMIN — IOHEXOL 80 ML: 350 INJECTION, SOLUTION INTRAVENOUS at 11:55

## 2024-09-01 RX ADMIN — SODIUM CHLORIDE 500 ML: 9 INJECTION, SOLUTION INTRAVENOUS at 13:45

## 2024-09-01 RX ADMIN — METHYLPREDNISOLONE SODIUM SUCCINATE 125 MG: 125 INJECTION, POWDER, FOR SOLUTION INTRAMUSCULAR; INTRAVENOUS at 11:06

## 2024-09-01 RX ADMIN — DIPHENHYDRAMINE HYDROCHLORIDE 25 MG: 50 INJECTION, SOLUTION INTRAMUSCULAR; INTRAVENOUS at 11:05

## 2024-09-01 RX ADMIN — FAMOTIDINE 40 MG: 10 INJECTION INTRAVENOUS at 11:13

## 2024-09-01 ASSESSMENT — FIBROSIS 4 INDEX: FIB4 SCORE: 2.51

## 2024-09-01 NOTE — ED TRIAGE NOTES
"Chief Complaint   Patient presents with    Lightheadedness     On and off for a week.  States she has been feeling this way since her right shoulder surgery 7/15/24     BP (!) 143/83   Pulse (!) 114   Temp 36.7 °C (98 °F) (Temporal)   Resp 18   Ht 1.626 m (5' 4\")   Wt 70.3 kg (155 lb)   SpO2 94%   BMI 26.61 kg/m²     "

## 2024-09-01 NOTE — ED PROVIDER NOTES
ED Provider Note    CHIEF COMPLAINT  Chief Complaint   Patient presents with    Lightheadedness     On and off for a week.  States she has been feeling this way since her right shoulder surgery 7/15/24       EXTERNAL RECORDS REVIEWED  Inpatient Notes discharge summary 7/23/2024.  History of hypertension, chronic bilateral knee pain, aortic root dilatation, aortic insufficiency, hypertension and dyslipidemia, CKD.  Presented 7/15 with right shoulder pain, left knee pain after a fall while hiking.  Right shoulder neck fracture, left proximal tibia fracture.  Status post open reduction internal fixation left tibia shaft with intramedullary nail.  Closed treatment proximal humerus.  Weightbearing as tolerated left lower extremity, nonweightbearing in a sling right upper extremity.  Mended rehab placement following hospital discharge.    HPI/ROS  LIMITATION TO HISTORY   Select: : None  OUTSIDE HISTORIAN(S):      Gali Broderick is a 79 y.o. female who presents to the emergency department through triage with her  for dizziness, lightheadedness.  Patient describes some very intermittent but longstanding history of the same.  However she had a fall with shoulder dislocation, humerus fracture, tibia fracture requiring ORIF in July and is now had 1 to 2 weeks of nearly intractable lightheadedness, dizziness.  Palpitations with higher than normal heart rate.  Denies chest pain, shortness of breath.  Denies true syncope.  Denies abdominal pain or back pain.  Denies headache or visual changes, slurred speech or focal weakness but has been more sedentary than normal and generally weak postoperatively.   home from rehab earlier this month, is to start physical therapy this coming week.    No fever chills, cough or congestion, vomiting or diarrhea.    PAST MEDICAL HISTORY   has a past medical history of Allergy, Chickenpox, Hypertension, Measles, and Mumps.    SURGICAL HISTORY   has a past surgical history that  "includes hernia repair; tonsillectomy and adenoidectomy; abdominal hysterectomy total; eye surgery; and tibia nailing intramedullary (Left, 7/16/2024).    FAMILY HISTORY  Family History   Problem Relation Age of Onset    No Known Problems Mother     Cancer Father        SOCIAL HISTORY  Social History     Tobacco Use    Smoking status: Never    Smokeless tobacco: Never   Substance and Sexual Activity    Alcohol use: Yes     Alcohol/week: 3.0 oz     Types: 5 Glasses of wine per week    Drug use: No    Sexual activity: Yes     Partners: Male       CURRENT MEDICATIONS  Home Medications       Reviewed by Calderon Castañeda (Pharmacy Tech) on 09/01/24 at 1133  Med List Status: Complete     Medication Last Dose Status   acetaminophen (TYLENOL) 500 MG Tab 8/30/2024 Active   aspirin 81 MG EC tablet 8/31/2024 Active   fluticasone (FLONASE) 50 MCG/ACT nasal spray > 1 week Active   multivitamin Tab 8/31/2024 Active   oxyCODONE immediate-release (ROXICODONE) 5 MG Tab 8/13/2024 Active   sulfamethoxazole-trimethoprim (BACTRIM DS) 800-160 MG tablet 8/18/2024 Active   telmisartan (MICARDIS) 40 MG Tab 8/31/2024 Active                  Audit from Redirected Encounters    **Home medications have not yet been reviewed for this encounter**         ALLERGIES  Allergies   Allergen Reactions    Iodine Swelling    Bactrim [Sulfamethoxazole W-Trimethoprim]      Dizziness, pre-syncope symptoms       PHYSICAL EXAM  VITAL SIGNS: BP (!) 149/78   Pulse 96   Temp 36.7 °C (98 °F) (Temporal)   Resp (!) 22   Ht 1.626 m (5' 4\")   Wt 70.3 kg (155 lb)   SpO2 96%   BMI 26.61 kg/m²    Pulse ox interpretation: I interpret this pulse ox as normal.  Constitutional: Alert in no apparent distress.  HENT: Normocephalic, atraumatic. Bilateral external ears normal, Nose normal. Moist mucous membranes.    Eyes: Pupils are equal and reactive, Conjunctiva normal.  EOMI, no nystagmus  Neck: Normal range of motion, Supple, non-tender. No stridor. "   Lymphatic: No lymphadenopathy noted.   Cardiovascular: Regular rate and rhythm, frequent PVC, systolic ejection murmurs. Distal pulses intact.  No peripheral edema.  No unilateral extremity swelling, discoloration, tenderness.  Thorax & Lungs: Normal breath sounds.  No wheezing/rales/ronchi. No increased work of breathing, clipped speech or retractions.   Abdomen: Soft, non-distended, non-tender to palpation. No palpable or pulsatile masses.  Skin: Warm, Dry, No erythema, No rash.   Musculoskeletal:  No major deformities noted.   Neurologic: Alert and x 4.  Speech clear and cohesive.  Moves 4 extremity spontaneously although right upper extremity limited due to recent fracture  Psychiatric: Affect normal, Judgment normal, Mood normal.       EKG/LABS  Results for orders placed or performed during the hospital encounter of 09/01/24   CBC w/ Differential   Result Value Ref Range    WBC 8.3 4.8 - 10.8 K/uL    RBC 4.39 4.20 - 5.40 M/uL    Hemoglobin 13.7 12.0 - 16.0 g/dL    Hematocrit 41.1 37.0 - 47.0 %    MCV 93.6 81.4 - 97.8 fL    MCH 31.2 27.0 - 33.0 pg    MCHC 33.3 32.2 - 35.5 g/dL    RDW 43.3 35.9 - 50.0 fL    Platelet Count 253 164 - 446 K/uL    MPV 10.5 9.0 - 12.9 fL    Neutrophils-Polys 82.00 (H) 44.00 - 72.00 %    Lymphocytes 11.50 (L) 22.00 - 41.00 %    Monocytes 5.40 0.00 - 13.40 %    Eosinophils 0.50 0.00 - 6.90 %    Basophils 0.40 0.00 - 1.80 %    Immature Granulocytes 0.20 0.00 - 0.90 %    Nucleated RBC 0.00 0.00 - 0.20 /100 WBC    Neutrophils (Absolute) 6.82 1.82 - 7.42 K/uL    Lymphs (Absolute) 0.96 (L) 1.00 - 4.80 K/uL    Monos (Absolute) 0.45 0.00 - 0.85 K/uL    Eos (Absolute) 0.04 0.00 - 0.51 K/uL    Baso (Absolute) 0.03 0.00 - 0.12 K/uL    Immature Granulocytes (abs) 0.02 0.00 - 0.11 K/uL    NRBC (Absolute) 0.00 K/uL   Complete Metabolic Panel (CMP)   Result Value Ref Range    Sodium 140 135 - 145 mmol/L    Potassium 4.2 3.6 - 5.5 mmol/L    Chloride 105 96 - 112 mmol/L    Co2 22 20 - 33 mmol/L     Anion Gap 13.0 7.0 - 16.0    Glucose 116 (H) 65 - 99 mg/dL    Bun 15 8 - 22 mg/dL    Creatinine 0.76 0.50 - 1.40 mg/dL    Calcium 10.0 8.4 - 10.2 mg/dL    Correct Calcium 9.8 8.5 - 10.5 mg/dL    AST(SGOT) 16 12 - 45 U/L    ALT(SGPT) 13 2 - 50 U/L    Alkaline Phosphatase 158 (H) 30 - 99 U/L    Total Bilirubin 0.3 0.1 - 1.5 mg/dL    Albumin 4.2 3.2 - 4.9 g/dL    Total Protein 7.5 6.0 - 8.2 g/dL    Globulin 3.3 1.9 - 3.5 g/dL    A-G Ratio 1.3 g/dL   Troponin - STAT Once   Result Value Ref Range    Troponin T 16 6 - 19 ng/L   D-Dimer (only helpful in low pre-test probability wells critieria. Do not order if patient ruled out by PERC criteria. See Weblinks at top of Labs section)   Result Value Ref Range    D-Dimer 1.46 (H) 0.00 - 0.50 ug/mL (FEU)   APTT   Result Value Ref Range    APTT 29.1 24.7 - 36.0 sec   PT/INR   Result Value Ref Range    PT 13.0 12.0 - 14.6 sec    INR 0.93 0.87 - 1.13   Magnesium   Result Value Ref Range    Magnesium 2.2 1.5 - 2.5 mg/dL   ESTIMATED GFR   Result Value Ref Range    GFR (CKD-EPI) 79 >60 mL/min/1.73 m 2   EKG   Result Value Ref Range    Report       [facility]    Test Date:  2024  Pt Name:    PATRICIA MONTERROSO                 Department: EDS  MRN:        [id number]                      [facility]  Gender:     Female                       Technician: [id number]  :        [id number]                   Requested By:ER TRIAGE PROTOCOL  Order #:    [id number]                    Minh MD: MANISH CRUZ, DO    Measurements  Intervals                                Axis  Rate:       107                          P:          69  OK:         160                          QRS:        1  QRSD:       91                           T:          5  QT:         324  QTc:        433    Interpretive Statements  Sinus tachycardia  Multiple ventricular premature complexes  No previous ECG available for comparison  Electronically Signed On 2024 10:53:17  PDT by MANISH CURZ, DO       I have independently interpreted this EKG    RADIOLOGY/PROCEDURES       Radiologist interpretation:  CT-CTA CHEST PULMONARY ARTERY W/ RECONS   Final Result      1.  Negative for pulmonary embolism      2.  Minimal left basilar atelectasis      3.  Old, healed right proximal humeral fracture      4.  Incidental hepatic cyst                COURSE & MEDICAL DECISION MAKING    ASSESSMENT, COURSE AND PLAN  Care Narrative:   Seen evaluated at bedside.  Lightheadedness, near syncope, higher than normal heart rate.  Patient states she is lightheaded at rest here in the ED, but worse with activity or when from sitting to standing.  Hemodynamically stable, blood pressure is elevated.  Add labs to include troponin, D-dimer.  EKG and can telemetry with occasional PVC.    D-dimer is elevated.  No clinical sequela for DVT although patient is postoperative and with increased sedentary lifestyle over the past month following trauma.  Add CTA of the chest to exclude PE.    1:18 PM CT negative for PE or other acute pathology.  Patient does appear somewhat orthostatic, just during my bedside evaluation lying to sitting with heart rate up to 103-108 although blood pressure is stable.  Add fluid bolus, road test.    2:13 PM patient feeling much better after fluid bolus.  Road test without ongoing lightheadedness, dizziness.  Heart rate has down trended as well.    ADDITIONAL PROBLEMS MANAGED  Status post ORIF left lower extremity  Status post right humerus fracture, shoulder dislocation    DISPOSITION AND DISCUSSIONS  ED evaluation for dizziness, near syncope is unrevealing.  Suspect mild volume depletion as well as deconditioning following a fall in the postoperative state.  Patient recently released from rehab.  Still with decreased activity, oral intake.  She is to start physical therapy this week which I think will help.  No leukocytosis or anemia.  No electrolyte derangement.  Renal function  preserved.  Troponin was normal.  EKG without ischemia.  Continuous telemetry with occasional PVC for which patient seems asymptomatic.  D-dimer elevated but CT negative for PE or pneumonia.  No urinary symptoms, urinalysis previously normal.  Doubt UTI.  Overall improvement with IV fluid hydration.  Ambulating now without difficulty nor ongoing dizziness.  Hemodynamically stable without fever, hypotension or hypoxia.  Mild tachycardia is appropriate  in the postoperative, deconditioned state but improved with intervention.  Ambulates independent with her hemiwalker.     Discussion of management with other Q or appropriate source(s):   10:51 AM  ... CT tech recognizes patient iodine allergy.  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  No other known interaction, anaphylaxis.  Will premedicate with Benadryl, Solu-Medrol, Pepcid.    Escalation of care considered, and ultimately not performed:acute inpatient care management, however at this time, the patient is most appropriate for outpatient management.     The patient is stable for discharge home, anticipatory guidance provided, continue home medications as previously indicated, close follow-up is encouraged and strict return instructions have been discussed. Patient is agreeable to the disposition and plan.      FINAL DIAGNOSIS  1. Lightheadedness    2. Near syncope    3. Post-operative state         Electronically signed by: Martha Benjamin D.O., 9/1/2024 10:51 AM

## 2024-09-01 NOTE — DISCHARGE INSTRUCTIONS
Follow-up with primary care this week for reevaluation, medication management.  Follow-up with orthopedics and for physical therapy as scheduled this week.    Continue home medications as previously indicated.  Weightbearing and activity as tolerated.    Encourage oral fluid hydration.    Return to the emergency department for intractable dizziness, chest pain, palpitations, shortness of breath, syncope or for vertigo, headache, slurred speech, focal weakness, fever, vomiting or other new concerns.

## 2024-09-01 NOTE — ED NOTES
Medication history reviewed with pt. Med rec is complete.  Allergies reviewed, per pt  Interviewed pt with  at bedside with permission from pt.    Pt reports that she started taking ASPIRIN 81MG 1 tablet BID, per pt reports that she will be off ASPIRIN 81MG on 9/9/2024.  Pt reports that she did not  her prescription for MIDODRINE 5MG that was prescribed to her on 8/16/2024.    Patient has had outpatient antibiotics in the last 30 days, pt started BACTRIM 800-160MG on 8/17/2024 for 3 day course.  Only took 2 tablets and stopped.     Pt is not on any anticoagulants

## 2024-09-12 ENCOUNTER — HOSPITAL ENCOUNTER (OUTPATIENT)
Dept: LAB | Facility: MEDICAL CENTER | Age: 79
End: 2024-09-12
Attending: NURSE PRACTITIONER
Payer: COMMERCIAL

## 2024-09-12 DIAGNOSIS — R74.01 TRANSAMINITIS: ICD-10-CM

## 2024-09-12 DIAGNOSIS — D64.9 ANEMIA, UNSPECIFIED TYPE: ICD-10-CM

## 2024-09-12 LAB
ALBUMIN SERPL BCP-MCNC: 4.1 G/DL (ref 3.2–4.9)
ALBUMIN/GLOB SERPL: 1.4 G/DL
ALP SERPL-CCNC: 144 U/L (ref 30–99)
ALT SERPL-CCNC: 26 U/L (ref 2–50)
ANION GAP SERPL CALC-SCNC: 13 MMOL/L (ref 7–16)
AST SERPL-CCNC: 28 U/L (ref 12–45)
BASOPHILS # BLD AUTO: 0.7 % (ref 0–1.8)
BASOPHILS # BLD: 0.05 K/UL (ref 0–0.12)
BILIRUB SERPL-MCNC: 0.2 MG/DL (ref 0.1–1.5)
BUN SERPL-MCNC: 17 MG/DL (ref 8–22)
CALCIUM ALBUM COR SERPL-MCNC: 9.4 MG/DL (ref 8.5–10.5)
CALCIUM SERPL-MCNC: 9.5 MG/DL (ref 8.5–10.5)
CHLORIDE SERPL-SCNC: 104 MMOL/L (ref 96–112)
CO2 SERPL-SCNC: 24 MMOL/L (ref 20–33)
CREAT SERPL-MCNC: 0.77 MG/DL (ref 0.5–1.4)
EOSINOPHIL # BLD AUTO: 0.1 K/UL (ref 0–0.51)
EOSINOPHIL NFR BLD: 1.5 % (ref 0–6.9)
ERYTHROCYTE [DISTWIDTH] IN BLOOD BY AUTOMATED COUNT: 45.9 FL (ref 35.9–50)
GFR SERPLBLD CREATININE-BSD FMLA CKD-EPI: 77 ML/MIN/1.73 M 2
GLOBULIN SER CALC-MCNC: 3 G/DL (ref 1.9–3.5)
GLUCOSE SERPL-MCNC: 83 MG/DL (ref 65–99)
HCT VFR BLD AUTO: 41.4 % (ref 37–47)
HGB BLD-MCNC: 13.3 G/DL (ref 12–16)
IMM GRANULOCYTES # BLD AUTO: 0.03 K/UL (ref 0–0.11)
IMM GRANULOCYTES NFR BLD AUTO: 0.4 % (ref 0–0.9)
LYMPHOCYTES # BLD AUTO: 1.38 K/UL (ref 1–4.8)
LYMPHOCYTES NFR BLD: 20.1 % (ref 22–41)
MCH RBC QN AUTO: 30.9 PG (ref 27–33)
MCHC RBC AUTO-ENTMCNC: 32.1 G/DL (ref 32.2–35.5)
MCV RBC AUTO: 96.1 FL (ref 81.4–97.8)
MONOCYTES # BLD AUTO: 0.44 K/UL (ref 0–0.85)
MONOCYTES NFR BLD AUTO: 6.4 % (ref 0–13.4)
NEUTROPHILS # BLD AUTO: 4.85 K/UL (ref 1.82–7.42)
NEUTROPHILS NFR BLD: 70.9 % (ref 44–72)
NRBC # BLD AUTO: 0 K/UL
NRBC BLD-RTO: 0 /100 WBC (ref 0–0.2)
PLATELET # BLD AUTO: 219 K/UL (ref 164–446)
PMV BLD AUTO: 11.5 FL (ref 9–12.9)
POTASSIUM SERPL-SCNC: 4.1 MMOL/L (ref 3.6–5.5)
PROT SERPL-MCNC: 7.1 G/DL (ref 6–8.2)
RBC # BLD AUTO: 4.31 M/UL (ref 4.2–5.4)
SODIUM SERPL-SCNC: 141 MMOL/L (ref 135–145)
WBC # BLD AUTO: 6.9 K/UL (ref 4.8–10.8)

## 2024-09-12 PROCEDURE — 36415 COLL VENOUS BLD VENIPUNCTURE: CPT

## 2024-09-12 PROCEDURE — 85025 COMPLETE CBC W/AUTO DIFF WBC: CPT

## 2024-09-12 PROCEDURE — 80053 COMPREHEN METABOLIC PANEL: CPT

## 2025-05-22 ENCOUNTER — APPOINTMENT (OUTPATIENT)
Dept: MEDICAL GROUP | Facility: LAB | Age: 80
End: 2025-05-22
Payer: COMMERCIAL

## 2025-05-22 VITALS
SYSTOLIC BLOOD PRESSURE: 142 MMHG | HEIGHT: 64 IN | WEIGHT: 156 LBS | DIASTOLIC BLOOD PRESSURE: 60 MMHG | OXYGEN SATURATION: 97 % | HEART RATE: 78 BPM | RESPIRATION RATE: 16 BRPM | BODY MASS INDEX: 26.63 KG/M2 | TEMPERATURE: 97.1 F

## 2025-05-22 DIAGNOSIS — I10 ESSENTIAL (PRIMARY) HYPERTENSION: ICD-10-CM

## 2025-05-22 DIAGNOSIS — J30.2 OTHER SEASONAL ALLERGIC RHINITIS: ICD-10-CM

## 2025-05-22 DIAGNOSIS — I77.810 AORTIC ROOT DILATATION (HCC): ICD-10-CM

## 2025-05-22 DIAGNOSIS — I35.1 MILD AORTIC INSUFFICIENCY: ICD-10-CM

## 2025-05-22 DIAGNOSIS — E78.49 OTHER HYPERLIPIDEMIA: ICD-10-CM

## 2025-05-22 DIAGNOSIS — Z00.00 MEDICARE ANNUAL WELLNESS VISIT, SUBSEQUENT: ICD-10-CM

## 2025-05-22 DIAGNOSIS — Z71.84 COUNSELING FOR TRAVEL: ICD-10-CM

## 2025-05-22 PROBLEM — R42 ORTHOSTATIC DIZZINESS: Status: RESOLVED | Noted: 2024-08-01 | Resolved: 2025-05-22

## 2025-05-22 PROBLEM — Z01.818 ENCOUNTER FOR PERIOPERATIVE CONSULTATION: Status: RESOLVED | Noted: 2024-07-16 | Resolved: 2025-05-22

## 2025-05-22 PROBLEM — S42.91XA CLOSED FRACTURE OF RIGHT SHOULDER: Status: RESOLVED | Noted: 2024-07-15 | Resolved: 2025-05-22

## 2025-05-22 PROBLEM — G89.29 CHRONIC PAIN OF BOTH KNEES: Status: RESOLVED | Noted: 2018-11-29 | Resolved: 2025-05-22

## 2025-05-22 PROBLEM — M25.562 CHRONIC PAIN OF BOTH KNEES: Status: RESOLVED | Noted: 2018-11-29 | Resolved: 2025-05-22

## 2025-05-22 PROBLEM — T07.XXXA MULTIPLE TRAUMA: Status: RESOLVED | Noted: 2024-07-23 | Resolved: 2025-05-22

## 2025-05-22 PROBLEM — M25.561 CHRONIC PAIN OF BOTH KNEES: Status: RESOLVED | Noted: 2018-11-29 | Resolved: 2025-05-22

## 2025-05-22 PROBLEM — S82.102A CLOSED FRACTURE OF PROXIMAL END OF LEFT TIBIA, UNSPECIFIED FRACTURE MORPHOLOGY, INITIAL ENCOUNTER: Status: RESOLVED | Noted: 2024-07-15 | Resolved: 2025-05-22

## 2025-05-22 RX ORDER — CEFDINIR 300 MG/1
300 CAPSULE ORAL 2 TIMES DAILY
Qty: 14 CAPSULE | Refills: 0 | Status: SHIPPED | OUTPATIENT
Start: 2025-05-22 | End: 2025-05-29

## 2025-05-22 RX ORDER — ASPIRIN 325 MG
325 TABLET ORAL DAILY
Qty: 90 TABLET | Refills: 5
Start: 2025-05-22

## 2025-05-22 ASSESSMENT — FIBROSIS 4 INDEX: FIB4 SCORE: 2.03

## 2025-05-22 ASSESSMENT — PATIENT HEALTH QUESTIONNAIRE - PHQ9: CLINICAL INTERPRETATION OF PHQ2 SCORE: 0

## 2025-05-22 ASSESSMENT — ENCOUNTER SYMPTOMS: GENERAL WELL-BEING: GOOD

## 2025-05-22 ASSESSMENT — ACTIVITIES OF DAILY LIVING (ADL): BATHING_REQUIRES_ASSISTANCE: 0

## 2025-05-22 NOTE — PROGRESS NOTES
Chief Complaint   Patient presents with    Medicare Annual Wellness     Verbal consent was acquired by the patient to use [employer] ambient listening note generation during this visit Yes     History of Present Illness  The patient is an 79-year-old established female here for a Medicare annual wellness visit. She was last seen in the fall of 2024 for UTI symptoms. In the fall of 2024, she had a trauma with multiple fractures and has completely healed.  Her last labs were done in the fall of 2024, showing a normal CBC with a CMP and an alk phos of 144, although she had a recent fracture. Magnesium was 2.2. Her last A1c in July 2024 was 4.9. Her only daily medication is telmisartan 40 mg for hypertension. She is due for a mammogram and PCV20.    She has been engaging in physical activities such as walking and stretching exercises without experiencing any associated pain. She reports morning stiffness, predominantly in her ankle, and occasional arm discomfort due to her sleeping position. She has observed a protrusion at the site of her surgical pins but reports no pain or concerns related to this. She continues to perform stretching exercises and uses resistance bands. She is not experiencing any pain.    She has requested a broad-spectrum antibiotic prescription for potential use during her upcoming trip to [place]. She has not experienced any recent colds or urinary tract infections.    She monitors her blood pressure at home, which typically ranges around 135/65 to 70, but can occasionally decrease to 125. She has an appointment with Dr. Franco in June 2025. She underwent an echocardiogram last year, which revealed no changes in her valves, and Dr. Franco did not order a repeat test this year. She is not experiencing any chest pain or respiratory difficulties.    She has not experienced any new headaches, although she occasionally wakes up with a headache, which she attributes to her sleeping position.  These headaches are managed with aspirin. She is not experiencing any dizziness upon standing.    She has experienced some weight loss following her injury but has since regained a few pounds. She maintains a regular diet and reports no hearing difficulties. She retains all her natural teeth and does not require annual eye examinations. She underwent cataract surgery in October 2024 and was advised that further follow-up was not necessary for several years. She is not experiencing any difficulty swallowing, palpable lumps or bumps, or abdominal pain.    She is currently taking telmisartan 40 mg for hypertension and has a 30-day supply remaining. She also takes aspirin and a multivitamin daily. She has discontinued oxycodone and avoids it even during hospital stays due to concerns about potential addiction.    PAST SURGICAL HISTORY:  Fracture repair of proximal tibia in fall 2024  Cataract surgery in October 2024    FAMILY HISTORY  Her mother lived to be 97 years old and took three regular aspirins a day.          Patient Active Problem List    Diagnosis Date Noted    Orthostatic dizziness 08/01/2024    Advance care planning 07/17/2024    Aortic root dilatation (HCC) 07/14/2022    Essential (primary) hypertension 07/12/2022    Moderate aortic insufficiency 11/29/2018    Chronic pain of both knees 11/29/2018    Other hyperlipidemia 08/29/2016    Other seasonal allergic rhinitis 05/09/2016       Current Medications[1]       Current supplements as per medication list.     Allergies: Iodine and Bactrim [sulfamethoxazole w-trimethoprim]    Current social contact/activities: buncco/ book club/ lunch/ Religion/ knitting/ friends/ cards     She  reports that she has never smoked. She has never used smokeless tobacco. She reports current alcohol use of about 3.0 oz of alcohol per week. She reports that she does not use drugs.  Counseling given: Not Answered      ROS:    Gait: Uses no assistive device  Ostomy: No  Other tubes:  No  Amputations: No  Chronic oxygen use: No  Last eye exam: 10/2024  Wears hearing aids: No   : Denies any urinary leakage during the last 6 months    Screening:    Depression Screening  Little interest or pleasure in doing things?  0 - not at all  Feeling down, depressed , or hopeless? 0 - not at all  Patient Health Questionnaire Score: 0     If depressive symptoms identified deferred to follow up visit unless specifically addressed in assessment and plan.    Interpretation of PHQ-9 Total Score   Score Severity   1-4 No Depression   5-9 Mild Depression   10-14 Moderate Depression   15-19 Moderately Severe Depression   20-27 Severe Depression    Screening for Cognitive Impairment  Do you or any of your friends or family members have any concern about your memory? No  Three Minute Recall (Village, Kitchen, Baby) 3/3    Barron clock face with all 12 numbers and set the hands to show 10 minutes past 11.  Yes    Cognitive concerns identified deferred for follow up unless specifically addressed in assessment and plan.    Fall Risk Assessment  Has the patient had two or more falls in the last year or any fall with injury in the last year?  No    Safety Assessment  Do you always wear your seatbelt?  Yes  Any changes to home needed to function safely? No  Difficulty hearing.  No  Patient counseled about all safety risks that were identified.    Functional Assessment ADLs  Are there any barriers preventing you from cooking for yourself or meeting nutritional needs?  No.    Are there any barriers preventing you from driving safely or obtaining transportation?  No.    Are there any barriers preventing you from using a telephone or calling for help?  No    Are there any barriers preventing you from shopping?  No.    Are there any barriers preventing you from taking care of your own finances?  No    Are there any barriers preventing you from managing your medications?  No    Are there any barriers preventing you from showering,  bathing or dressing yourself? No    Are there any barriers preventing you from doing housework or laundry? No  Are there any barriers preventing you from using the toilet?No  Are you currently engaging in any exercise or physical activity?  Yes.      Self-Assessment of Health  What is your perception of your health? Good    Do you sleep more than six hours a night? Yes    In the past 7 days, how much did pain keep you from doing your normal work? None    Do you spend quality time with family or friends (virtually or in person)? Yes    Do you usually eat a heart healthy diet that constists of a variety of fruits, vegetables, whole grains and fiber? Yes    Do you eat foods high in fat and/or Fast Food more than three times per week? No    How concerned are you that your medical conditions are not being well managed? Not at all    Are you worried that in the next 2 months, you may not have stable housing that you own, rent, or stay in as part of a household? No      Advance Care Planning  Do you have an Advance Directive, Living Will, Durable Power of , or POLST? Yes  Advance Directive       is on file      Health Maintenance Summary            Current Care Gaps       Mammogram (Yearly) Overdue since 1/19/2024 01/19/2023  MA-SCREENING MAMMO BILAT W/TOMOSYNTHESIS W/CAD    05/29/2019  [id number]    11/09/2017  [id number]    08/03/2016  MA-SCREEN MAMMO W/CAD-BILAT    09/22/2005  [id number]     Only the first 5 history entries have been loaded, but more history exists.            Pneumococcal Vaccine: 50+ Years (1 of 1 - PCV) Never done      No completion history exists for this topic.                      Upcoming       Zoster (Shingles) Vaccines (1 of 2) Postponed until 10/22/2025     No completion history exists for this topic.              IMM DTaP/Tdap/Td Vaccine (1 - Tdap) Postponed until 5/18/2026     No completion history exists for this topic.              COVID-19  Vaccine (1 - 2024-25 season) Postponed until 5/22/2026     No completion history exists for this topic.              Influenza Vaccine (Season Ended) Next due on 9/1/2025     No completion history exists for this topic.              Annual Wellness Visit (Yearly) Next due on 5/22/2026 05/22/2025  Visit Dx: Medicare annual wellness visit, subsequent    12/12/2023  Visit Dx: Medicare annual wellness visit, subsequent    12/08/2022  Visit Dx: Medicare annual wellness visit, subsequent    12/07/2021  Visit Dx: Medicare annual wellness visit, subsequent    12/07/2021  Level of Service: IN ANNUAL WELLNESS VISIT-INCLUDES PPPS SUBSEQUE*     Only the first 5 history entries have been loaded, but more history exists.                    Completed or No Longer Recommended       Hepatitis A Vaccine (Hep A) (Series Information) Aged Out      No completion history exists for this topic.              HPV Vaccines (Series Information) Aged Out     No completion history exists for this topic.              Polio Vaccine (Inactivated Polio) (Series Information) Aged Out     No completion history exists for this topic.              Meningococcal Immunization (Series Information) Aged Out     No completion history exists for this topic.              Meningococcal B Vaccine (Series Information) Aged Out     No completion history exists for this topic.              Bone Density Scan  Discontinued      09/22/2005  DS-BONE DENSITY STUDY (DEXA)              Hepatitis B Vaccine (Hep B)  Discontinued     No completion history exists for this topic.                            Patient Care Team:  JOHNNY Fowler as PCP - General (Family Medicine)  Piotr Jacobson M.D. as Consulting Physician (Dermatology)  Michael Franco D.O. (Cardiovascular Disease (Cardiology))  Erick Phillips M.D. (Ophthalmology)        Social History[2]  Family History   Problem Relation Age of Onset    No Known Problems Mother     Cancer Father      She   "has a past medical history of Allergy, Chickenpox, Hypertension, Measles, and Mumps.   Past Surgical History[3]    Exam:   BP (!) 142/60 (BP Location: Right arm, Patient Position: Sitting, BP Cuff Size: Large adult)   Pulse 78   Temp 36.2 °C (97.1 °F)   Resp 16   Ht 1.626 m (5' 4\")   Wt 70.8 kg (156 lb)   SpO2 97%  Body mass index is 26.78 kg/m².    Hearing excellent.    Dentition good  Alert, oriented in no acute distress.  Eye contact is good, speech goal directed, affect calm  CV RRR - murmur to right second intercostal space, 2-3 out of 6.  Respiratory: Clear to auscultation bilaterally  Neck: No thyromegaly or masses.    Assessment and Plan. The following treatment and monitoring plan is recommended:    \"  1. Medicare annual wellness visit, subsequent        2. Counseling for travel  cefdinir (OMNICEF) 300 MG Cap      3. Other hyperlipidemia        4. Essential (primary) hypertension  Lipid Profile    TSH    Comp Metabolic Panel    CBC WITH DIFFERENTIAL      5. Aortic root dilatation (HCC)        6. Moderate aortic insufficiency        7. Other seasonal allergic rhinitis        \"  Overall doing fantastic.  Not in pain.  Exercising regularly.  No bowel or bladder issues.  Recommend a full updated lab panel in the fall.  She requested antibiotics to take with her to [place] in August in case she gets an infection and we discussed bacterial versus viral infections and when to start antibiotic therapy.  She has an appointment to follow-up with her cardiologist, Dr. Franco.  Compliant with [employer].  Encouraged her to schedule a mammogram.  Declines vaccines.  Follow-up yearly, sooner with any problems or concerns.      Services suggested: No services needed at this time  Health Care Screening: Age-appropriate preventive services recommended by USPTF and ACIP covered by Medicare were discussed today. Services ordered if indicated and agreed upon by the patient.  Referrals offered: Community-based lifestyle " interventions to reduce health risks and promote self-management and wellness, fall prevention, nutrition, physical activity, tobacco-use cessation, weight loss, and mental health services as per orders if indicated.    Discussion today about general wellness and lifestyle habits:    Prevent falls and reduce trip hazards; Cautioned about securing or removing rugs.  Have a working fire alarm and carbon monoxide detector;   Engage in regular physical activity and social activities     Follow-up: yearly         [1]   Current Outpatient Medications   Medication Sig Dispense Refill    multivitamin Tab Take 1 Tablet by mouth every day.      fluticasone (FLONASE) 50 MCG/ACT nasal spray Administer 1 Spray into affected nostril(S) 2 times a day as needed. Indications: Stuffy Nose      sulfamethoxazole-trimethoprim (BACTRIM DS) 800-160 MG tablet Take 1 Tablet by mouth 2 times a day. Pt started on 8/17/2024 for 3 day course, only took 2 tablets and stopped      oxyCODONE immediate-release (ROXICODONE) 5 MG Tab Take 5 mg by mouth every 8 hours as needed for Severe Pain.      telmisartan (MICARDIS) 40 MG Tab Take 40 mg by mouth every evening.      acetaminophen (TYLENOL) 500 MG Tab Take 2 Tablets by mouth in the morning, at noon, and at bedtime. (Patient taking differently: Take 500-1,000 mg by mouth every 8 hours as needed for Moderate Pain.) 30 Tablet 0    aspirin 81 MG EC tablet Take 1 Tablet by mouth 2 times a day. Take for 30 days then off. 60 Tablet 0     No current facility-administered medications for this visit.   [2]   Social History  Tobacco Use    Smoking status: Never    Smokeless tobacco: Never   Substance Use Topics    Alcohol use: Yes     Alcohol/week: 3.0 oz     Types: 5 Glasses of wine per week    Drug use: No   [3]   Past Surgical History:  Procedure Laterality Date    TIBIA NAILING INTRAMEDULLARY Left 7/16/2024    Procedure: INSERTION, INTRAMEDULLARY EVANGELINA, TIBIA;  Surgeon: Ramírez Denney M.D.;  [facility]:  [facility];  Service: Orthopedics    ABDOMINAL HYSTERECTOMY TOTAL      EYE SURGERY      cataract    HERNIA REPAIR      TONSILLECTOMY AND ADENOIDECTOMY

## 2025-08-27 ENCOUNTER — HOSPITAL ENCOUNTER (OUTPATIENT)
Dept: LAB | Facility: MEDICAL CENTER | Age: 80
End: 2025-08-27
Attending: NURSE PRACTITIONER
Payer: COMMERCIAL

## 2025-08-27 DIAGNOSIS — I10 ESSENTIAL (PRIMARY) HYPERTENSION: ICD-10-CM

## 2025-08-27 LAB
ALBUMIN SERPL BCP-MCNC: 4.1 G/DL (ref 3.2–4.9)
ALBUMIN/GLOB SERPL: 1.4 G/DL
ALP SERPL-CCNC: 112 U/L (ref 30–99)
ALT SERPL-CCNC: 19 U/L (ref 2–50)
ANION GAP SERPL CALC-SCNC: 11 MMOL/L (ref 7–16)
AST SERPL-CCNC: 22 U/L (ref 12–45)
BASOPHILS # BLD AUTO: 0.7 % (ref 0–1.8)
BASOPHILS # BLD: 0.04 K/UL (ref 0–0.12)
BILIRUB SERPL-MCNC: 0.5 MG/DL (ref 0.1–1.5)
BUN SERPL-MCNC: 17 MG/DL (ref 8–22)
CALCIUM ALBUM COR SERPL-MCNC: 9.1 MG/DL (ref 8.5–10.5)
CALCIUM SERPL-MCNC: 9.2 MG/DL (ref 8.5–10.5)
CHLORIDE SERPL-SCNC: 108 MMOL/L (ref 96–112)
CHOLEST SERPL-MCNC: 196 MG/DL (ref 100–199)
CO2 SERPL-SCNC: 22 MMOL/L (ref 20–33)
CREAT SERPL-MCNC: 0.86 MG/DL (ref 0.5–1.4)
EOSINOPHIL # BLD AUTO: 0.2 K/UL (ref 0–0.51)
EOSINOPHIL NFR BLD: 3.6 % (ref 0–6.9)
ERYTHROCYTE [DISTWIDTH] IN BLOOD BY AUTOMATED COUNT: 46.2 FL (ref 35.9–50)
GFR SERPLBLD CREATININE-BSD FMLA CKD-EPI: 67 ML/MIN/1.73 M 2
GLOBULIN SER CALC-MCNC: 3 G/DL (ref 1.9–3.5)
GLUCOSE SERPL-MCNC: 95 MG/DL (ref 65–99)
HCT VFR BLD AUTO: 39.1 % (ref 37–47)
HDLC SERPL-MCNC: 55 MG/DL
HGB BLD-MCNC: 12.9 G/DL (ref 12–16)
IMM GRANULOCYTES # BLD AUTO: 0.02 K/UL (ref 0–0.11)
IMM GRANULOCYTES NFR BLD AUTO: 0.4 % (ref 0–0.9)
LDLC SERPL CALC-MCNC: 121 MG/DL
LYMPHOCYTES # BLD AUTO: 1.31 K/UL (ref 1–4.8)
LYMPHOCYTES NFR BLD: 23.8 % (ref 22–41)
MCH RBC QN AUTO: 30.4 PG (ref 27–33)
MCHC RBC AUTO-ENTMCNC: 33 G/DL (ref 32.2–35.5)
MCV RBC AUTO: 92 FL (ref 81.4–97.8)
MONOCYTES # BLD AUTO: 0.4 K/UL (ref 0–0.85)
MONOCYTES NFR BLD AUTO: 7.3 % (ref 0–13.4)
NEUTROPHILS # BLD AUTO: 3.54 K/UL (ref 1.82–7.42)
NEUTROPHILS NFR BLD: 64.2 % (ref 44–72)
NRBC # BLD AUTO: 0 K/UL
NRBC BLD-RTO: 0 /100 WBC (ref 0–0.2)
PLATELET # BLD AUTO: 217 K/UL (ref 164–446)
PMV BLD AUTO: 11.4 FL (ref 9–12.9)
POTASSIUM SERPL-SCNC: 4.2 MMOL/L (ref 3.6–5.5)
PROT SERPL-MCNC: 7.1 G/DL (ref 6–8.2)
RBC # BLD AUTO: 4.25 M/UL (ref 4.2–5.4)
SODIUM SERPL-SCNC: 141 MMOL/L (ref 135–145)
TRIGL SERPL-MCNC: 99 MG/DL (ref 0–149)
TSH SERPL-ACNC: 1.75 UIU/ML (ref 0.38–5.33)
WBC # BLD AUTO: 5.5 K/UL (ref 4.8–10.8)

## 2025-08-27 PROCEDURE — 84443 ASSAY THYROID STIM HORMONE: CPT

## 2025-08-27 PROCEDURE — 85025 COMPLETE CBC W/AUTO DIFF WBC: CPT

## 2025-08-27 PROCEDURE — 80061 LIPID PANEL: CPT

## 2025-08-27 PROCEDURE — 80053 COMPREHEN METABOLIC PANEL: CPT

## 2025-08-27 PROCEDURE — 36415 COLL VENOUS BLD VENIPUNCTURE: CPT

## (undated) DEVICE — BANDAGE ELASTIC STERILE MATRIX 6 X 10 (20EA/CA)

## (undated) DEVICE — GLOVE SZ 7.5 BIOGEL PI MICRO - PF LF (50PR/BX)

## (undated) DEVICE — ELECTRODE DUAL RETURN W/ CORD - (50/PK)

## (undated) DEVICE — SODIUM CHL IRRIGATION 0.9% 1000ML (12EA/CA)

## (undated) DEVICE — SUCTION INSTRUMENT YANKAUER BULBOUS TIP W/O VENT (50EA/CA)

## (undated) DEVICE — GLOVE BIOGEL INDICATOR SZ 8 SURGICAL PF LTX - (50/BX 4BX/CA)

## (undated) DEVICE — PACK LOWER EXTREMITY - (2/CA)

## (undated) DEVICE — SUTURE 2-0 VICRYL PLUS CT-1 - 8 X 18 INCH(12/BX)

## (undated) DEVICE — DRAPE C ARMOR (12EA/CA)

## (undated) DEVICE — SLING ORTH UNV TIETX VLFM ARM

## (undated) DEVICE — BLADE SURGICAL #10 - (50/BX)

## (undated) DEVICE — DRAPE 36X28IN RAD CARM BND BG - (25/CA) O

## (undated) DEVICE — Device

## (undated) DEVICE — SUTURE ETHILON 2-0 FSLX 30 (36PK/BX)"

## (undated) DEVICE — BANDAGE ELASTIC STERILE VELCRO 6 X 5 YDS (25EA/CA)

## (undated) DEVICE — SET LEADWIRE 5 LEAD BEDSIDE DISPOSABLE ECG (1SET OF 5/EA)

## (undated) DEVICE — PADDING CAST 6 IN STERILE - 6 X 4 YDS (24/CA)

## (undated) DEVICE — REAMER SHAFT  MODIFIED TRINKLE  8X510MM

## (undated) DEVICE — STAPLER SKIN DISP - (6/BX 10BX/CA) VISISTAT

## (undated) DEVICE — TUBING CLEARLINK DUO-VENT - C-FLO (48EA/CA)

## (undated) DEVICE — CANISTER SUCTION 3000ML MECHANICAL FILTER AUTO SHUTOFF MEDI-VAC NONSTERILE LF DISP  (40EA/CA)

## (undated) DEVICE — GOWN WARMING STANDARD FLEX - (30/CA)

## (undated) DEVICE — SET EXTENSION WITH 2 PORTS (48EA/CA) ***PART #2C8610 IS A SUBSTITUTE*****

## (undated) DEVICE — SUTURE 0 VICRYL PLUS CT-1 - 8 X 18 INCH (12/BX)

## (undated) DEVICE — COVER LIGHT HANDLE ALC PLUS DISP (18EA/BX)

## (undated) DEVICE — GLOVE BIOGEL PI INDICATOR SZ 7.0 SURGICAL PF LF - (50/BX 4BX/CA)

## (undated) DEVICE — SUTURE GENERAL

## (undated) DEVICE — LACTATED RINGERS INJ 1000 ML - (14EA/CA 60CA/PF)

## (undated) DEVICE — FREEHAND DRILL 4.2X130MM

## (undated) DEVICE — PAD LAP STERILE 18 X 18 - (5/PK 40PK/CA)

## (undated) DEVICE — LOCKING DRILL 4.2X360MM

## (undated) DEVICE — SENSOR OXIMETER ADULT SPO2 RD SET (20EA/BX)